# Patient Record
Sex: FEMALE | Race: BLACK OR AFRICAN AMERICAN | NOT HISPANIC OR LATINO | Employment: FULL TIME | URBAN - METROPOLITAN AREA
[De-identification: names, ages, dates, MRNs, and addresses within clinical notes are randomized per-mention and may not be internally consistent; named-entity substitution may affect disease eponyms.]

---
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
## 2023-02-14 ENCOUNTER — HOSPITAL ENCOUNTER (EMERGENCY)
Facility: HOSPITAL | Age: 23
Discharge: HOME | End: 2023-02-14
Attending: EMERGENCY MEDICINE
Payer: MEDICAID

## 2023-02-14 VITALS
OXYGEN SATURATION: 99 % | SYSTOLIC BLOOD PRESSURE: 123 MMHG | RESPIRATION RATE: 18 BRPM | BODY MASS INDEX: 22.3 KG/M2 | TEMPERATURE: 99 F | HEIGHT: 65 IN | DIASTOLIC BLOOD PRESSURE: 75 MMHG | HEART RATE: 88 BPM | WEIGHT: 133.81 LBS

## 2023-02-14 DIAGNOSIS — K59.00 CONSTIPATION, UNSPECIFIED CONSTIPATION TYPE: ICD-10-CM

## 2023-02-14 DIAGNOSIS — R10.9 ABDOMINAL PAIN IN PREGNANCY, FIRST TRIMESTER: Primary | ICD-10-CM

## 2023-02-14 DIAGNOSIS — O26.891 ABDOMINAL PAIN IN PREGNANCY, FIRST TRIMESTER: Primary | ICD-10-CM

## 2023-02-14 PROCEDURE — 99284 EMERGENCY DEPT VISIT MOD MDM: CPT | Mod: 25

## 2023-02-14 NOTE — Clinical Note
"Izzy Hollowaystu Palacio was seen and treated in our emergency department on 2/14/2023.  She may return to work on 03/01/2023.  Patient was seen and admitted 0n 2/19 and allowed to return to work on March 1, 2023.      If you have any questions or concerns, please don't hesitate to call.       LPN    "

## 2023-02-15 NOTE — DISCHARGE INSTRUCTIONS
You may take Unisom 20 mg (doxylamine) and vitamin B6 20 mg (pyridoxine) at bedtime to help with nausea.     Eat several small meals per day.    Krista candy and peppermints may also help with nausea.      You may take Colace (stool softener) 200 mg daily to help with constipation and Gas X to help with bloating.    Follow up with your OB next week.    Drink plenty of fluids and get a lot of rest.     Return to ER for any changes or worsening of symptoms.

## 2023-02-15 NOTE — ED PROVIDER NOTES
Encounter Date: 2023       History     Chief Complaint   Patient presents with    Abdominal Pain     Pt reports lower abdominal pain and constipation x 3 days. Passed hard stool today. Confirmed pregnancy x 2 weeks.      21 YO AAF in ER with complaints of lower abdominal pain with bloating and constipation. Also having breast tenderness with nausea and vomiting. LMP was 23 and she has her first OB appt next week.  and currently 6W 5D by LMP. Denies vaginal bleeding, pelvic pain, vaginal discharge, loss of fluids, contractions, back pain, fever, chills, chest pain, SOB, diarrhea, HA or dizziness.     The history is provided by the patient.   Review of patient's allergies indicates:  No Known Allergies  History reviewed. No pertinent past medical history.  History reviewed. No pertinent surgical history.  History reviewed. No pertinent family history.  Social History     Tobacco Use    Smoking status: Never    Smokeless tobacco: Never   Substance Use Topics    Drug use: Never     Review of Systems   Constitutional:  Negative for chills and fever.   HENT:  Negative for congestion and sore throat.    Respiratory:  Negative for shortness of breath.    Cardiovascular:  Negative for chest pain.   Gastrointestinal:  Positive for abdominal pain, constipation, nausea and vomiting. Negative for diarrhea.   Genitourinary:  Negative for dysuria, pelvic pain, vaginal bleeding, vaginal discharge and vaginal pain.   Musculoskeletal:  Negative for back pain.   Skin:  Negative for rash.   Neurological:  Negative for dizziness, weakness, light-headedness and headaches.   Hematological:  Does not bruise/bleed easily.   All other systems reviewed and are negative.    Physical Exam     Initial Vitals [23 1745]   BP Pulse Resp Temp SpO2   (!) 146/85 104 17 98.6 °F (37 °C) 100 %      MAP       --         Physical Exam    Nursing note and vitals reviewed.  Constitutional: She appears well-developed and well-nourished.  She is not diaphoretic. No distress.   HENT:   Head: Normocephalic and atraumatic.   Nose: Nose normal.   Eyes: Conjunctivae are normal.   Cardiovascular:  Normal rate and regular rhythm.           Pulmonary/Chest: Breath sounds normal.   Abdominal: Abdomen is soft. Bowel sounds are normal. There is abdominal tenderness (mild generalized, tympanic to percussion). There is no rebound and no guarding.   Musculoskeletal:         General: Normal range of motion.     Neurological: She is alert and oriented to person, place, and time.   Skin: Skin is warm and dry.   Psychiatric: She has a normal mood and affect.       ED Course   ED US Pelvis OB    Date/Time: 2/14/2023 8:51 PM  Performed by: LINDA Holt  Authorized by: J Luis Blackburn DO     Indication:  Pregnancy  Identified Structures:  Uterus transverse and Uterus sagittal  Definitive IUP:  Indeterminant  Uterine Contents:  Other (too much bowel gas to visualize uterus/contents)  Impression:  No definitive IUP  Charge?:  No (educational)  Labs Reviewed - No data to display       Imaging Results    None          Medications - No data to display              ED Course as of 02/14/23 2054 Tue Feb 14, 2023 2052 VSS, NAD, pt is non-toxic or ill appearing, offered pt official US to rule out ectopic pregnancy even though is it low on my differential due to lower abdominal pain and unable to see IUP with bedside US, pt declines and states she would rather follow up with her OB next week, strict return to ER precautions given, she verbalized understanding, will give instructions on what to take for constipation and gas during pregnancy, she verbalized understanding,  treatment plan and discharge instructions including follow up discussed, pt verbalized understanding, all questions answered, pt is stable and ready for discharge  [TT]      ED Course User Index  [TT] LINDA Holt                 Clinical Impression:   Final diagnoses:  [O26.891, R10.9] Abdominal pain  in pregnancy, first trimester (Primary)  [K59.00] Constipation, unspecified constipation type        ED Disposition Condition    Discharge Stable          ED Prescriptions    None       Follow-up Information       Follow up With Specialties Details Why Contact Info    [facility] - Emergency Dept Emergency Medicine In 3 days As needed, If symptoms worsen [address] [facility] [id number] [phone]    [facility]    Keep your initial OB appointment next week             LINDA Holt  02/14/23 1130

## 2023-02-19 ENCOUNTER — ANESTHESIA (OUTPATIENT)
Dept: SURGERY | Facility: HOSPITAL | Age: 23
End: 2023-02-19
Payer: MEDICAID

## 2023-02-19 ENCOUNTER — ANESTHESIA EVENT (OUTPATIENT)
Dept: SURGERY | Facility: HOSPITAL | Age: 23
End: 2023-02-19
Payer: MEDICAID

## 2023-02-19 ENCOUNTER — HOSPITAL ENCOUNTER (OUTPATIENT)
Facility: HOSPITAL | Age: 23
Discharge: HOME | End: 2023-02-20
Attending: EMERGENCY MEDICINE | Admitting: OBSTETRICS & GYNECOLOGY
Payer: MEDICAID

## 2023-02-19 DIAGNOSIS — R73.9 HYPERGLYCEMIA: ICD-10-CM

## 2023-02-19 DIAGNOSIS — O00.90 RUPTURED ECTOPIC PREGNANCY: Primary | ICD-10-CM

## 2023-02-19 DIAGNOSIS — R57.8 HEMORRHAGIC SHOCK: ICD-10-CM

## 2023-02-19 DIAGNOSIS — K66.1 RUPTURED RIGHT TUBAL ECTOPIC PREGNANCY CAUSING HEMOPERITONEUM: ICD-10-CM

## 2023-02-19 DIAGNOSIS — K66.1 RUPTURED LEFT TUBAL ECTOPIC PREGNANCY CAUSING HEMOPERITONEUM: ICD-10-CM

## 2023-02-19 DIAGNOSIS — O00.101 RUPTURED RIGHT TUBAL ECTOPIC PREGNANCY CAUSING HEMOPERITONEUM: ICD-10-CM

## 2023-02-19 DIAGNOSIS — O00.102 RUPTURED LEFT TUBAL ECTOPIC PREGNANCY CAUSING HEMOPERITONEUM: ICD-10-CM

## 2023-02-19 LAB
ABORH RETYPE: NORMAL
ALBUMIN SERPL-MCNC: 3.1 G/DL (ref 3.5–5)
ALBUMIN/GLOB SERPL: 1.3 RATIO (ref 1.1–2)
ALP SERPL-CCNC: 55 UNIT/L (ref 40–150)
ALT SERPL-CCNC: 7 UNIT/L (ref 0–55)
APPEARANCE UR: CLEAR
AST SERPL-CCNC: 9 UNIT/L (ref 5–34)
B-HCG FREE SERPL-ACNC: 4847.94 MIU/ML
BACTERIA #/AREA URNS AUTO: ABNORMAL /HPF
BASOPHILS # BLD AUTO: 0.01 X10(3)/MCL (ref 0–0.2)
BASOPHILS # BLD AUTO: 0.02 X10(3)/MCL (ref 0–0.2)
BASOPHILS NFR BLD AUTO: 0.1 %
BASOPHILS NFR BLD AUTO: 0.1 %
BILIRUB UR QL STRIP.AUTO: NEGATIVE MG/DL
BILIRUBIN DIRECT+TOT PNL SERPL-MCNC: 0.5 MG/DL
BUN SERPL-MCNC: 12.2 MG/DL (ref 7–18.7)
CALCIUM SERPL-MCNC: 8.4 MG/DL (ref 8.4–10.2)
CHLORIDE SERPL-SCNC: 106 MMOL/L (ref 98–107)
CO2 SERPL-SCNC: 15 MMOL/L (ref 22–29)
COLOR UR AUTO: ABNORMAL
CREAT SERPL-MCNC: 1.36 MG/DL (ref 0.55–1.02)
EOSINOPHIL # BLD AUTO: 0 X10(3)/MCL (ref 0–0.9)
EOSINOPHIL # BLD AUTO: 0 X10(3)/MCL (ref 0–0.9)
EOSINOPHIL NFR BLD AUTO: 0 %
EOSINOPHIL NFR BLD AUTO: 0 %
ERYTHROCYTE [DISTWIDTH] IN BLOOD BY AUTOMATED COUNT: 14 % (ref 11.5–17)
ERYTHROCYTE [DISTWIDTH] IN BLOOD BY AUTOMATED COUNT: 14.4 % (ref 11.5–17)
GFR SERPLBLD CREATININE-BSD FMLA CKD-EPI: 57 MLS/MIN/1.73/M2
GLOBULIN SER-MCNC: 2.3 GM/DL (ref 2.4–3.5)
GLUCOSE SERPL-MCNC: 249 MG/DL (ref 74–100)
GLUCOSE UR QL STRIP.AUTO: NORMAL MG/DL
GROUP & RH: NORMAL
HCT VFR BLD AUTO: 25.5 % (ref 37–47)
HCT VFR BLD AUTO: 26 % (ref 37–47)
HGB BLD-MCNC: 8.3 G/DL (ref 12–16)
HGB BLD-MCNC: 8.3 G/DL (ref 12–16)
HYALINE CASTS #/AREA URNS LPF: ABNORMAL /LPF
IMM GRANULOCYTES # BLD AUTO: 0.06 X10(3)/MCL (ref 0–0.04)
IMM GRANULOCYTES # BLD AUTO: 0.08 X10(3)/MCL (ref 0–0.04)
IMM GRANULOCYTES NFR BLD AUTO: 0.3 %
IMM GRANULOCYTES NFR BLD AUTO: 0.5 %
INDIRECT COOMBS GEL: NORMAL
KETONES UR QL STRIP.AUTO: ABNORMAL MG/DL
LEUKOCYTE ESTERASE UR QL STRIP.AUTO: 25 UNIT/L
LYMPHOCYTES # BLD AUTO: 1.04 X10(3)/MCL (ref 0.6–4.6)
LYMPHOCYTES # BLD AUTO: 1.05 X10(3)/MCL (ref 0.6–4.6)
LYMPHOCYTES NFR BLD AUTO: 5.9 %
LYMPHOCYTES NFR BLD AUTO: 6.2 %
MCH RBC QN AUTO: 28.1 PG
MCH RBC QN AUTO: 28.9 PG
MCHC RBC AUTO-ENTMCNC: 31.9 G/DL (ref 33–36)
MCHC RBC AUTO-ENTMCNC: 32.5 G/DL (ref 33–36)
MCV RBC AUTO: 86.4 FL (ref 80–94)
MCV RBC AUTO: 90.6 FL (ref 80–94)
MONOCYTES # BLD AUTO: 0.36 X10(3)/MCL (ref 0.1–1.3)
MONOCYTES # BLD AUTO: 0.41 X10(3)/MCL (ref 0.1–1.3)
MONOCYTES NFR BLD AUTO: 2.1 %
MONOCYTES NFR BLD AUTO: 2.3 %
MUCOUS THREADS URNS QL MICRO: ABNORMAL /LPF
NEUTROPHILS # BLD AUTO: 15.29 X10(3)/MCL (ref 2.1–9.2)
NEUTROPHILS # BLD AUTO: 16.13 X10(3)/MCL (ref 2.1–9.2)
NEUTROPHILS NFR BLD AUTO: 91.1 %
NEUTROPHILS NFR BLD AUTO: 91.4 %
NITRITE UR QL STRIP.AUTO: NEGATIVE
NRBC BLD AUTO-RTO: 0 %
NRBC BLD AUTO-RTO: 0 %
PH UR STRIP.AUTO: 6 [PH]
PLATELET # BLD AUTO: 238 X10(3)/MCL (ref 130–400)
PLATELET # BLD AUTO: 337 X10(3)/MCL (ref 130–400)
PMV BLD AUTO: 10.4 FL (ref 7.4–10.4)
PMV BLD AUTO: 11.4 FL (ref 7.4–10.4)
POCT GLUCOSE: 114 MG/DL (ref 70–110)
POCT GLUCOSE: 120 MG/DL (ref 70–110)
POTASSIUM SERPL-SCNC: 4 MMOL/L (ref 3.5–5.1)
PROT SERPL-MCNC: 5.4 GM/DL (ref 6.4–8.3)
PROT UR QL STRIP.AUTO: NEGATIVE MG/DL
RBC # BLD AUTO: 2.87 X10(6)/MCL (ref 4.2–5.4)
RBC # BLD AUTO: 2.95 X10(6)/MCL (ref 4.2–5.4)
RBC #/AREA URNS AUTO: ABNORMAL /HPF
RBC UR QL AUTO: NEGATIVE UNIT/L
SODIUM SERPL-SCNC: 136 MMOL/L (ref 136–145)
SP GR UR STRIP.AUTO: 1.01
SQUAMOUS #/AREA URNS LPF: ABNORMAL /HPF
UROBILINOGEN UR STRIP-ACNC: NORMAL MG/DL
WBC # SPEC AUTO: 16.8 X10(3)/MCL (ref 4.5–11.5)
WBC # SPEC AUTO: 17.7 X10(3)/MCL (ref 4.5–11.5)
WBC #/AREA URNS AUTO: ABNORMAL /HPF

## 2023-02-19 PROCEDURE — 71000033 HC RECOVERY, INTIAL HOUR: Performed by: OBSTETRICS & GYNECOLOGY

## 2023-02-19 PROCEDURE — 63600175 PHARM REV CODE 636 W HCPCS: Performed by: ANESTHESIOLOGY

## 2023-02-19 PROCEDURE — 81001 URINALYSIS AUTO W/SCOPE: CPT | Performed by: STUDENT IN AN ORGANIZED HEALTH CARE EDUCATION/TRAINING PROGRAM

## 2023-02-19 PROCEDURE — 96361 HYDRATE IV INFUSION ADD-ON: CPT | Mod: 59

## 2023-02-19 PROCEDURE — 37000009 HC ANESTHESIA EA ADD 15 MINS: Performed by: OBSTETRICS & GYNECOLOGY

## 2023-02-19 PROCEDURE — 63600175 PHARM REV CODE 636 W HCPCS: Performed by: STUDENT IN AN ORGANIZED HEALTH CARE EDUCATION/TRAINING PROGRAM

## 2023-02-19 PROCEDURE — 86850 RBC ANTIBODY SCREEN: CPT | Performed by: EMERGENCY MEDICINE

## 2023-02-19 PROCEDURE — 00840 ANES IPER PX LOWER ABD NOS: CPT | Performed by: OBSTETRICS & GYNECOLOGY

## 2023-02-19 PROCEDURE — 96375 TX/PRO/DX INJ NEW DRUG ADDON: CPT

## 2023-02-19 PROCEDURE — G0378 HOSPITAL OBSERVATION PER HR: HCPCS

## 2023-02-19 PROCEDURE — 96376 TX/PRO/DX INJ SAME DRUG ADON: CPT

## 2023-02-19 PROCEDURE — 86900 BLOOD TYPING SEROLOGIC ABO: CPT | Performed by: EMERGENCY MEDICINE

## 2023-02-19 PROCEDURE — 86920 COMPATIBILITY TEST SPIN: CPT | Performed by: EMERGENCY MEDICINE

## 2023-02-19 PROCEDURE — 96376 TX/PRO/DX INJ SAME DRUG ADON: CPT | Mod: 59

## 2023-02-19 PROCEDURE — 85025 COMPLETE CBC W/AUTO DIFF WBC: CPT | Performed by: STUDENT IN AN ORGANIZED HEALTH CARE EDUCATION/TRAINING PROGRAM

## 2023-02-19 PROCEDURE — P9016 RBC LEUKOCYTES REDUCED: HCPCS | Performed by: EMERGENCY MEDICINE

## 2023-02-19 PROCEDURE — 96374 THER/PROPH/DIAG INJ IV PUSH: CPT | Mod: 59

## 2023-02-19 PROCEDURE — 25000003 PHARM REV CODE 250: Performed by: STUDENT IN AN ORGANIZED HEALTH CARE EDUCATION/TRAINING PROGRAM

## 2023-02-19 PROCEDURE — 36430 TRANSFUSION BLD/BLD COMPNT: CPT

## 2023-02-19 PROCEDURE — 25000003 PHARM REV CODE 250: Performed by: EMERGENCY MEDICINE

## 2023-02-19 PROCEDURE — 25000003 PHARM REV CODE 250: Performed by: NURSE ANESTHETIST, CERTIFIED REGISTERED

## 2023-02-19 PROCEDURE — 37000008 HC ANESTHESIA 1ST 15 MINUTES: Performed by: OBSTETRICS & GYNECOLOGY

## 2023-02-19 PROCEDURE — 63600175 PHARM REV CODE 636 W HCPCS: Performed by: NURSE ANESTHETIST, CERTIFIED REGISTERED

## 2023-02-19 PROCEDURE — 27201423 OPTIME MED/SURG SUP & DEVICES STERILE SUPPLY: Performed by: OBSTETRICS & GYNECOLOGY

## 2023-02-19 PROCEDURE — 80053 COMPREHEN METABOLIC PANEL: CPT | Performed by: EMERGENCY MEDICINE

## 2023-02-19 PROCEDURE — 84702 CHORIONIC GONADOTROPIN TEST: CPT | Performed by: EMERGENCY MEDICINE

## 2023-02-19 PROCEDURE — 99900035 HC TECH TIME PER 15 MIN (STAT)

## 2023-02-19 PROCEDURE — 36000706: Performed by: OBSTETRICS & GYNECOLOGY

## 2023-02-19 PROCEDURE — 99291 CRITICAL CARE FIRST HOUR: CPT

## 2023-02-19 PROCEDURE — 86901 BLOOD TYPING SEROLOGIC RH(D): CPT | Performed by: EMERGENCY MEDICINE

## 2023-02-19 PROCEDURE — 88305 TISSUE EXAM BY PATHOLOGIST: CPT | Performed by: OBSTETRICS & GYNECOLOGY

## 2023-02-19 PROCEDURE — 94799 UNLISTED PULMONARY SVC/PX: CPT

## 2023-02-19 PROCEDURE — 85025 COMPLETE CBC W/AUTO DIFF WBC: CPT | Performed by: EMERGENCY MEDICINE

## 2023-02-19 PROCEDURE — 36000707: Performed by: OBSTETRICS & GYNECOLOGY

## 2023-02-19 RX ORDER — GLUCAGON 1 MG
1 KIT INJECTION
Status: DISCONTINUED | OUTPATIENT
Start: 2023-02-19 | End: 2023-02-20 | Stop reason: HOSPADM

## 2023-02-19 RX ORDER — KETOROLAC TROMETHAMINE 30 MG/ML
30 INJECTION, SOLUTION INTRAMUSCULAR; INTRAVENOUS EVERY 8 HOURS
Status: COMPLETED | OUTPATIENT
Start: 2023-02-19 | End: 2023-02-20

## 2023-02-19 RX ORDER — MORPHINE SULFATE 2 MG/ML
INJECTION, SOLUTION INTRAMUSCULAR; INTRAVENOUS
Status: DISPENSED
Start: 2023-02-19 | End: 2023-02-19

## 2023-02-19 RX ORDER — SODIUM CHLORIDE, SODIUM LACTATE, POTASSIUM CHLORIDE, CALCIUM CHLORIDE 600; 310; 30; 20 MG/100ML; MG/100ML; MG/100ML; MG/100ML
INJECTION, SOLUTION INTRAVENOUS CONTINUOUS
Status: DISCONTINUED | OUTPATIENT
Start: 2023-02-19 | End: 2023-02-19

## 2023-02-19 RX ORDER — TALC
6 POWDER (GRAM) TOPICAL NIGHTLY PRN
Status: DISCONTINUED | OUTPATIENT
Start: 2023-02-19 | End: 2023-02-20 | Stop reason: HOSPADM

## 2023-02-19 RX ORDER — SUCCINYLCHOLINE CHLORIDE 20 MG/ML
INJECTION INTRAMUSCULAR; INTRAVENOUS
Status: DISCONTINUED | OUTPATIENT
Start: 2023-02-19 | End: 2023-02-19

## 2023-02-19 RX ORDER — INSULIN ASPART 100 [IU]/ML
0-5 INJECTION, SOLUTION INTRAVENOUS; SUBCUTANEOUS
Status: DISCONTINUED | OUTPATIENT
Start: 2023-02-19 | End: 2023-02-20 | Stop reason: HOSPADM

## 2023-02-19 RX ORDER — OXYCODONE HYDROCHLORIDE 5 MG/1
5 TABLET ORAL
Status: DISCONTINUED | OUTPATIENT
Start: 2023-02-19 | End: 2023-02-19

## 2023-02-19 RX ORDER — DEXTROSE 40 %
15 GEL (GRAM) ORAL
Status: DISCONTINUED | OUTPATIENT
Start: 2023-02-19 | End: 2023-02-20 | Stop reason: HOSPADM

## 2023-02-19 RX ORDER — KETOROLAC TROMETHAMINE 30 MG/ML
INJECTION, SOLUTION INTRAMUSCULAR; INTRAVENOUS
Status: DISCONTINUED | OUTPATIENT
Start: 2023-02-19 | End: 2023-02-19

## 2023-02-19 RX ORDER — DEXAMETHASONE SODIUM PHOSPHATE 4 MG/ML
INJECTION, SOLUTION INTRA-ARTICULAR; INTRALESIONAL; INTRAMUSCULAR; INTRAVENOUS; SOFT TISSUE
Status: DISCONTINUED | OUTPATIENT
Start: 2023-02-19 | End: 2023-02-19

## 2023-02-19 RX ORDER — DOCUSATE SODIUM 100 MG/1
100 CAPSULE, LIQUID FILLED ORAL 2 TIMES DAILY
Status: DISCONTINUED | OUTPATIENT
Start: 2023-02-19 | End: 2023-02-20 | Stop reason: HOSPADM

## 2023-02-19 RX ORDER — ONDANSETRON 4 MG/1
8 TABLET, ORALLY DISINTEGRATING ORAL EVERY 8 HOURS PRN
Status: DISCONTINUED | OUTPATIENT
Start: 2023-02-19 | End: 2023-02-20 | Stop reason: HOSPADM

## 2023-02-19 RX ORDER — DIPHENHYDRAMINE HYDROCHLORIDE 50 MG/ML
25 INJECTION INTRAMUSCULAR; INTRAVENOUS EVERY 4 HOURS PRN
Status: DISCONTINUED | OUTPATIENT
Start: 2023-02-19 | End: 2023-02-20 | Stop reason: HOSPADM

## 2023-02-19 RX ORDER — ONDANSETRON 2 MG/ML
4 INJECTION INTRAMUSCULAR; INTRAVENOUS DAILY PRN
Status: DISCONTINUED | OUTPATIENT
Start: 2023-02-19 | End: 2023-02-19

## 2023-02-19 RX ORDER — MORPHINE SULFATE 2 MG/ML
2 INJECTION, SOLUTION INTRAMUSCULAR; INTRAVENOUS EVERY 5 MIN PRN
Status: DISCONTINUED | OUTPATIENT
Start: 2023-02-19 | End: 2023-02-19

## 2023-02-19 RX ORDER — SODIUM CHLORIDE 0.9 % (FLUSH) 0.9 %
10 SYRINGE (ML) INJECTION
Status: DISCONTINUED | OUTPATIENT
Start: 2023-02-19 | End: 2023-02-20 | Stop reason: HOSPADM

## 2023-02-19 RX ORDER — CEFAZOLIN SODIUM 1 G/3ML
INJECTION, POWDER, FOR SOLUTION INTRAMUSCULAR; INTRAVENOUS
Status: DISCONTINUED | OUTPATIENT
Start: 2023-02-19 | End: 2023-02-19

## 2023-02-19 RX ORDER — IBUPROFEN 600 MG/1
600 TABLET ORAL EVERY 6 HOURS
Status: DISCONTINUED | OUTPATIENT
Start: 2023-02-20 | End: 2023-02-20 | Stop reason: HOSPADM

## 2023-02-19 RX ORDER — ONDANSETRON 2 MG/ML
INJECTION INTRAMUSCULAR; INTRAVENOUS
Status: DISCONTINUED | OUTPATIENT
Start: 2023-02-19 | End: 2023-02-19

## 2023-02-19 RX ORDER — OXYCODONE AND ACETAMINOPHEN 5; 325 MG/1; MG/1
1 TABLET ORAL EVERY 4 HOURS PRN
Status: DISCONTINUED | OUTPATIENT
Start: 2023-02-19 | End: 2023-02-20 | Stop reason: HOSPADM

## 2023-02-19 RX ORDER — MEPERIDINE HYDROCHLORIDE 25 MG/ML
12.5 INJECTION INTRAMUSCULAR; INTRAVENOUS; SUBCUTANEOUS EVERY 10 MIN PRN
Status: DISCONTINUED | OUTPATIENT
Start: 2023-02-19 | End: 2023-02-19

## 2023-02-19 RX ORDER — LIDOCAINE HYDROCHLORIDE 20 MG/ML
INJECTION INTRAVENOUS
Status: DISCONTINUED | OUTPATIENT
Start: 2023-02-19 | End: 2023-02-19

## 2023-02-19 RX ORDER — MUPIROCIN 20 MG/G
OINTMENT TOPICAL 2 TIMES DAILY
Status: DISCONTINUED | OUTPATIENT
Start: 2023-02-19 | End: 2023-02-20 | Stop reason: HOSPADM

## 2023-02-19 RX ORDER — BISACODYL 10 MG
10 SUPPOSITORY, RECTAL RECTAL DAILY PRN
Status: DISCONTINUED | OUTPATIENT
Start: 2023-02-19 | End: 2023-02-20 | Stop reason: HOSPADM

## 2023-02-19 RX ORDER — PROPOFOL 10 MG/ML
VIAL (ML) INTRAVENOUS
Status: DISCONTINUED | OUTPATIENT
Start: 2023-02-19 | End: 2023-02-19

## 2023-02-19 RX ORDER — PROCHLORPERAZINE EDISYLATE 5 MG/ML
5 INJECTION INTRAMUSCULAR; INTRAVENOUS EVERY 6 HOURS PRN
Status: DISCONTINUED | OUTPATIENT
Start: 2023-02-19 | End: 2023-02-20 | Stop reason: HOSPADM

## 2023-02-19 RX ORDER — DIPHENHYDRAMINE HCL 25 MG
25 CAPSULE ORAL EVERY 4 HOURS PRN
Status: DISCONTINUED | OUTPATIENT
Start: 2023-02-19 | End: 2023-02-20 | Stop reason: HOSPADM

## 2023-02-19 RX ORDER — OXYCODONE AND ACETAMINOPHEN 10; 325 MG/1; MG/1
1 TABLET ORAL EVERY 4 HOURS PRN
Status: DISCONTINUED | OUTPATIENT
Start: 2023-02-19 | End: 2023-02-20 | Stop reason: HOSPADM

## 2023-02-19 RX ORDER — FENTANYL CITRATE 50 UG/ML
INJECTION, SOLUTION INTRAMUSCULAR; INTRAVENOUS
Status: DISCONTINUED | OUTPATIENT
Start: 2023-02-19 | End: 2023-02-19

## 2023-02-19 RX ADMIN — CEFAZOLIN 2 G: 330 INJECTION, POWDER, FOR SOLUTION INTRAMUSCULAR; INTRAVENOUS at 07:02

## 2023-02-19 RX ADMIN — FENTANYL CITRATE 25 MCG: 50 INJECTION, SOLUTION INTRAMUSCULAR; INTRAVENOUS at 08:02

## 2023-02-19 RX ADMIN — MORPHINE SULFATE 2 MG: 2 INJECTION, SOLUTION INTRAMUSCULAR; INTRAVENOUS at 10:02

## 2023-02-19 RX ADMIN — SODIUM CHLORIDE, POTASSIUM CHLORIDE, SODIUM LACTATE AND CALCIUM CHLORIDE: 600; 310; 30; 20 INJECTION, SOLUTION INTRAVENOUS at 07:02

## 2023-02-19 RX ADMIN — MUPIROCIN: 20 OINTMENT TOPICAL at 08:02

## 2023-02-19 RX ADMIN — SUCCINYLCHOLINE CHLORIDE 120 MG: 20 INJECTION, SOLUTION INTRAMUSCULAR; INTRAVENOUS at 07:02

## 2023-02-19 RX ADMIN — KETOROLAC TROMETHAMINE 30 MG: 30 INJECTION, SOLUTION INTRAMUSCULAR; INTRAVENOUS at 01:02

## 2023-02-19 RX ADMIN — FENTANYL CITRATE 25 MCG: 50 INJECTION, SOLUTION INTRAMUSCULAR; INTRAVENOUS at 07:02

## 2023-02-19 RX ADMIN — ONDANSETRON 4 MG: 2 INJECTION INTRAMUSCULAR; INTRAVENOUS at 08:02

## 2023-02-19 RX ADMIN — KETOROLAC TROMETHAMINE 30 MG: 30 INJECTION, SOLUTION INTRAMUSCULAR; INTRAVENOUS at 08:02

## 2023-02-19 RX ADMIN — OXYCODONE AND ACETAMINOPHEN 1 TABLET: 325; 5 TABLET ORAL at 08:02

## 2023-02-19 RX ADMIN — SUGAMMADEX 200 MG: 100 INJECTION, SOLUTION INTRAVENOUS at 08:02

## 2023-02-19 RX ADMIN — OXYCODONE AND ACETAMINOPHEN 1 TABLET: 325; 5 TABLET ORAL at 01:02

## 2023-02-19 RX ADMIN — MORPHINE SULFATE 2 MG: 2 INJECTION, SOLUTION INTRAMUSCULAR; INTRAVENOUS at 09:02

## 2023-02-19 RX ADMIN — FENTANYL CITRATE 50 MCG: 50 INJECTION, SOLUTION INTRAMUSCULAR; INTRAVENOUS at 08:02

## 2023-02-19 RX ADMIN — PROPOFOL 150 MG: 10 INJECTION, EMULSION INTRAVENOUS at 07:02

## 2023-02-19 RX ADMIN — SODIUM CHLORIDE 1000 ML: 9 INJECTION, SOLUTION INTRAVENOUS at 05:02

## 2023-02-19 RX ADMIN — DOCUSATE SODIUM 100 MG: 100 CAPSULE, LIQUID FILLED ORAL at 08:02

## 2023-02-19 RX ADMIN — SODIUM CHLORIDE: 9 INJECTION, SOLUTION INTRAVENOUS at 07:02

## 2023-02-19 RX ADMIN — KETOROLAC TROMETHAMINE 30 MG: 30 INJECTION, SOLUTION INTRAMUSCULAR; INTRAVENOUS at 09:02

## 2023-02-19 RX ADMIN — DEXAMETHASONE SODIUM PHOSPHATE 8 MG: 4 INJECTION, SOLUTION INTRA-ARTICULAR; INTRALESIONAL; INTRAMUSCULAR; INTRAVENOUS; SOFT TISSUE at 07:02

## 2023-02-19 RX ADMIN — LIDOCAINE HYDROCHLORIDE 50 MG: 20 INJECTION, SOLUTION INTRAVENOUS at 07:02

## 2023-02-19 NOTE — H&P
[facility] OB/GYN CLINIC NOTE  [facility]  [address] [place] [address]  Phone: [phone]  Fax: [phone]    [facility] Gynecology Consult - Resident Note    Consulting Physician: ED  Reason for consult: Suspected ruptured ectopic pregnancy  Subjective:        HPI:   Izzy Palacio is an 22 y.o.  year old woman at 7w2d by LMP with no significant PMH whp presents via EMS with 1 day of worsening abdominal pain and light vaginal bleeding.     On arrival to ED, BP 80/50 and HR 140s. 1L NS given with rise in BP to 100/60s, HR remaining 130s-140s.    Patient alert and responsive, states belly pain began yesterday and has been increasing over time. Her partner reports he found her in bed stiff and thought she was dying so called the ambulance. She reports she has been cold and feeling weak throughout the day. She is 7w2d by LMP, had US 2 that showed obscuring bowel gas and nothing in the uterus.     Review of systems  Negative unless noted in HPI    Past Medical History  History reviewed. No pertinent past medical history.    Past Surgical History  History reviewed. No pertinent surgical history.    Obstetrical History  OB History    Para Term  AB Living   1             SAB IAB Ectopic Multiple Live Births                  # Outcome Date GA Lbr Juan Antonio/2nd Weight Sex Delivery Anes PTL Lv   1 Current                Gynecologic History  Patient's last menstrual period was 2022 (approximate).  STD history: Denies  Pap smear history: Denies abnormal    Allergies  Review of patient's allergies indicates:  No Known Allergies    Family History  History reviewed. No pertinent family history.    Social History  Social History     Tobacco Use    Smoking status: Never    Smokeless tobacco: Never   Substance Use Topics    Alcohol use: Not Currently    Drug use: Never       Overview of active problems  There are no problems to display for this patient.      Medications  Home medications  (Not in a hospital  admission)      Current Inpatient medications    Current Facility-Administered Medications:     sodium chloride 0.9% bolus 1,000 mL 1,000 mL, 1,000 mL, Intravenous, ED 1 Time, Audi Mclean MD, Last Rate: 999 mL/hr at 02/19/23 0515, 1,000 mL at 02/19/23 0515  No current outpatient medications on file.       Objective:     Vitals:    02/19/23 0447   BP: (!) 136/93   BP Location: Left arm   Patient Position: Sitting   Pulse: (!) 134   Resp: 18   Temp: 98.1 °F (36.7 °C)   TempSrc: Temporal   SpO2: 100%     There is no height or weight on file to calculate BMI.    No intake/output data recorded.    Physical Exam:  General: alert and oriented, in mild distress, pale   Lungs: clear to auscultation bilaterally   Heart: regular rhythm, tachycardic   Abdomen: Tense, ttp in RLQ +rebound tenderness, somewhat distended, acute surgical abdomen   DVT Evaluation: No cords or calf tenderness.  No significant calf/ankle edema. Somewhat cold to touch   Extremities: Normal, atraumatic, non-edematous   External genitalia: Normal female genitalia without lesion, discharge or tenderness.   Bimanual Exam: Deferred.   Rectal Exam: Deferred   Speculum Exam: Deferred.   Note: RN chaperone present for entirety of exam.    Results:     LABS  Trended:  No results for input(s): WBC, HGB, HCT, PLT, MCV, RDW, BANDSPCT, LYMPHOPCT, MONOPCT, EOSINOPCT, BASOPCT, NA, K, CL, CO2, BUN, CREATININE, LABCREA, GLU, CALCIUM, PROT, ALBUMIN, BILITOT, AST, ALT, ALKPHOS in the last 168 hours.    Invalid input(s): SEGSPCT  No results found for this or any previous visit (from the past 24 hour(s)).     MICRO:  No pertinent new    IMAGING  TVUS pending     Assessment:     Izzy Palacio is a 22 y.o. female with abdominal pain, acute abdomen, tachycardia, and hypotension; suspected ruptured ectopic pregnancy. To OR for exploratory laparotomy, excision of ectopic pregnancy, possible salpingectomy, possible oophorectomy.      Recommendations:     Suspected ruptured  ectopic pregnancy:  - consents signed  - to OR for above procedure  - Will type and match for 2 units    Discussed with staff, Dr. Barksdale.    Sharon Kendall MD  [facility] PGY3

## 2023-02-19 NOTE — TRANSFER OF CARE
Anesthesia Transfer of Care Note    Patient: Izzy Palacio    Procedure(s) Performed: Procedure(s) (LRB):  Ex Laparatomy, Right Salpingectomy, Evacuation of Hemoperitoneum (N/A)  SALPINGECTOMY (N/A)    Patient location: PACU    Anesthesia Type: general    Transport from OR: Transported from OR on room air with adequate spontaneous ventilation    Post pain: adequate analgesia    Post assessment: no apparent anesthetic complications and tolerated procedure well    Post vital signs: stable    Level of consciousness: sedated    Nausea/Vomiting: no nausea/vomiting    Complications: none    Transfer of care protocol was followedComments: Report to Jane WILKINSON      Last vitals:   Visit Vitals  BP (!) 144/86   Pulse (!) 129   Temp 36.4 °C (97.6 °F) (Temporal)   Resp (!) 27   Wt 68.8 kg (151 lb 10.8 oz)   LMP 12/30/2022 (Approximate)   SpO2 100%   Breastfeeding No   BMI 25.24 kg/m²

## 2023-02-19 NOTE — ANESTHESIA PREPROCEDURE EVALUATION
02/19/2023  Izzy Palacio is a 22 y.o., female with no significant PMHx presents for diagnostic laparoscopy secondary to ruptured ectopic pregnancy.    NO BETA BLOCKER USE    Active Ambulatory Problems     Diagnosis Date Noted    No Active Ambulatory Problems     Resolved Ambulatory Problems     Diagnosis Date Noted    No Resolved Ambulatory Problems     No Additional Past Medical History           Pre-op Assessment    I have reviewed the NPO Status.      Review of Systems  Anesthesia Hx:  No previous Anesthesia    Social:  Non-Smoker    Cardiovascular:  Cardiovascular Normal     Pulmonary:  Pulmonary Normal    Renal/:  Renal/ Normal     Hepatic/GI:  Hepatic/GI Normal    Neurological:  Neurology Normal    Endocrine:  Endocrine Normal      Vitals:    02/19/23 0637 02/19/23 0638 02/19/23 0646 02/19/23 0652   BP: 138/82 122/84 (!) 144/86    BP Location:       Patient Position:       Pulse: (!) 132 (!) 142 (!) 126 (!) 129   Resp: (!) 24 (!) 29 (!) 25 (!) 27   Temp: 36.5 °C (97.7 °F)      TempSrc: Oral      SpO2: 100% 99% (!) 84% 100%   Weight:             Physical Exam  General: Alert, Cooperative and Well nourished    Airway:  Mallampati: II   Mouth Opening: Normal  TM Distance: Normal  Tongue: Normal  Neck ROM: Normal ROM    Dental:  Intact    Chest/Lungs:  Clear to auscultation, Normal Respiratory Rate    Heart:  Rate: Normal  Rhythm: Regular Rhythm  Sounds: Normal      Lab Results   Component Value Date    WBC 16.8 (H) 02/19/2023    HGB 8.3 (L) 02/19/2023    HCT 26.0 (L) 02/19/2023    MCV 90.6 02/19/2023     02/19/2023       CMP  Sodium Level   Date Value Ref Range Status   02/19/2023 136 136 - 145 mmol/L Final     Potassium Level   Date Value Ref Range Status   02/19/2023 4.0 3.5 - 5.1 mmol/L Final     Carbon Dioxide   Date Value Ref Range Status   02/19/2023 15 (L) 22 - 29 mmol/L Final      Blood Urea Nitrogen   Date Value Ref Range Status   02/19/2023 12.2 7.0 - 18.7 mg/dL Final     Creatinine   Date Value Ref Range Status   02/19/2023 1.36 (H) 0.55 - 1.02 mg/dL Final     Calcium Level Total   Date Value Ref Range Status   02/19/2023 8.4 8.4 - 10.2 mg/dL Final     Albumin Level   Date Value Ref Range Status   02/19/2023 3.1 (L) 3.5 - 5.0 g/dL Final     Bilirubin Total   Date Value Ref Range Status   02/19/2023 0.5 <=1.5 mg/dL Final     Alkaline Phosphatase   Date Value Ref Range Status   02/19/2023 55 40 - 150 unit/L Final     Aspartate Aminotransferase   Date Value Ref Range Status   02/19/2023 9 5 - 34 unit/L Final     Alanine Aminotransferase   Date Value Ref Range Status   02/19/2023 7 0 - 55 unit/L Final     eGFR   Date Value Ref Range Status   02/19/2023 57 mls/min/1.73/m2 Final         Anesthesia Plan  Type of Anesthesia, risks & benefits discussed:    Anesthesia Type: Gen ETT  Intra-op Monitoring Plan: Standard ASA Monitors  Post Op Pain Control Plan: IV/PO Opioids PRN  Induction:  rapid sequence and IV  Airway Plan: Direct  Informed Consent: Informed consent signed with the Patient and all parties understand the risks and agree with anesthesia plan.  All questions answered.   ASA Score: 2 Emergent  Day of Surgery Review of History & Physical: H&P Update referred to the surgeon/provider.    Ready For Surgery From Anesthesia Perspective.     .

## 2023-02-19 NOTE — BRIEF OP NOTE
[facility] - Emergency Dept  Brief Operative Note    SUMMARY     Surgery Date: 2/19/2023     Surgeon(s) and Role:     * Shayy Barksdale MD - Primary  *Sharon Kendall MD - Resident    Pre-op Diagnosis:    Suspected ruptured ectopic pregnancy    Post-op Diagnosis:    Right ruptured tubal ectopic pregnancy  Hemoperitoneum  8x6cm right retroperitoneal mass    Procedure(s) (LRB):  Exploratory laparatomy, right salpingectomy, evacuation of hemoperitoneum    Anesthesia: General    Operative Findings: EUA: Normal external genitalia without lesion. Tense abdomen with +rebound tenderness in RLQ. Fullness in bilateral adnexae.  Intraoperatively: Approximately 800cc hemoperitoneum noted immediately upon intraperitoneal entry. Right ruptured tubal ectopic pregnancy in distal portion of right fallopian tube. Left fallopian tube and bilateral ovaries normal in appearance. Anterior and posterior cul de sac without lesion. Uterus 6wk size, mobile. Inflammatory changes noted in adjacent omentum. 8x6cm retroperitoneal mass palpated in right pelvic, smooth, consistent with right pelvic kidney noted on prior ultrasound. Appendix visualized and normal without erythema or edema. Bowel normal in appearance.     Estimated Blood Loss: 800 mL    IVF: 2000mL crystalloid; 300mL packed red blood cells    Urine Output: 300mL misti urine via Isaac catheter; lightened at end of case         Specimens:   Specimen (24h ago, onward)       Start     Ordered    02/19/23 0754  Specimen to Pathology  RELEASE UPON ORDERING        References:    Click here for ordering Quick Tip   Question:  Release to patient  Answer:  Immediate    02/19/23 0754                    [id number]    Sharon Kendall MD  [facility] PGY3

## 2023-02-19 NOTE — Clinical Note
"Izzy Hollowaygemma" Hakeem was seen and treated in our emergency department on 2/19/2023.  She may return to work on 02/25/2023.       If you have any questions or concerns, please don't hesitate to call.      Tyson WILKINSON    "

## 2023-02-19 NOTE — ED NOTES
"Patient's step mother called nursing staff to room stating that patient "is passing out". Upon entering patient's room, patient pale, appears to have syncopal episode. Patient alert, making eye contact with staff. Dr. Audi Mclean at bedside. Patient placed in trendelenburg position. Lactated ringers 1L bolus initiated per IV. Warming blankets placed on patient.    "

## 2023-02-19 NOTE — OP NOTE
[facility] Emergency Dept  Surgery Department  Operative Note    SUMMARY     Date of Procedure: 2/19/2023     Procedure:   Procedure(s) (LRB):  Exploratory laparatomy, right salpingectomy, evacuation of hemoperitoneum    Surgeon(s) and Role:     * Shayy Barksdale MD - Primary  *Sharon Kendall MD - Resident    Pre-op Diagnosis:    Suspected ruptured ectopic pregnancy     Post-op Diagnosis:    Right ruptured tubal ectopic pregnancy  Hemoperitoneum  8x6cm right retroperitoneal mass    Anesthesia: General    Operative Findings: EUA: Normal external genitalia without lesion. Tense abdomen with +rebound tenderness in RLQ. Fullness in bilateral adnexae.  Intraoperatively: Approximately 800cc hemoperitoneum noted immediately upon intraperitoneal entry. Right ruptured tubal ectopic pregnancy in distal portion of right fallopian tube. Left fallopian tube and bilateral ovaries normal in appearance. Anterior and posterior cul de sac without lesion. Uterus 6wk size, mobile. Inflammatory changes noted in adjacent omentum. 8x6cm retroperitoneal mass palpated in right pelvic, smooth, consistent with right pelvic kidney noted on prior ultrasound. Appendix visualized and normal without erythema or edema. Bowel normal in appearance.    Description of Technical Procedures:   Patient was taken to the OR with IVF and one unit packed red blood cells running. Ancef 2 grams IV was administered for infection prophylaxis. SCDs were placed to bilateral lower extremities for DVT prophylaxis. She was placed in dorsal supine position. General endotracheal anesthesia was then administered without incident. The patient was positioned in dorsal lithotomy position using Arik stirrups, with careful attention to leg positioning. Both arms were tucked in  position with liberal padding of the elbows and hands. A Bovie pad was placed on the right side. A timeout was performed.  A pelvic exam was performed and noted the above  findings. The patient was then prepped and draped in the usual sterile fashion.     Attention was paid to the abdomen. A midline vertical incision was made from 4cm below umbilical to above pubic symphysis. The incision was carried down to the fascia with the scalpel. The fascia was incised with the scalpel and the incision was extended vertically with Martinez scissors. The rectus muscles were  bluntly. The peritoneum was grasped and elevated with two hemostats and entered sharply with Metzenbaum scissors. Immediate return of dark red blood and clots was noted.     The incision was extended approximately 2 additional cm superiorly for better visualization. Clots were swept out of the pelvis and posterior cul de sac. An Joel retractor was placed. The patient was placed in Trendelenberg. The right fallopian tube was inspected and ruptured ectopic pregnancy was noted. Left fallopian tube and bilateral ovaries appeared normal. The mesosalpinx below the right fallopian tube was clamped with Joyce clamps, transected with Metzenbaums, and suture ligated x 2 for hemostasis. Good hemostasis was noted. The specimen was handed off to be sent for pathology.    The pelvis was carefully inspected. Blood was wiped out of bilateral pericolic gutters. Additional clots were swept out of the posterior cul de sac. An area of the omentum adjacent to the right adnexa with inflammatory changes was inspected and noted to be actively bleeding. Decision was made to remove small portion of the omentum. The area was clamped, cut and suture ligated. Good hemostasis was noted. The specimen was sent to pathology.    A 8x6cm mass was noted in the right pelvis retroperitoneally, mobile with smooth borders, noted to be consistent with patient's history of right pelvic kidney.    The patient was taken out of Trendelenberg. The pelvis was irrigated, and good hemostasis was noted. The right mesosalpinx and remnant of the fallopian tube were  examined again and were noted to be hemostatic. The Joel retractor was removed.      The fascia and peritoneum were closed together with 0 PDS in a running fashion. Three interrupted sutures were placed subcutaenously with 0 vicryl. The skin was closed in a subcuticular fasion with 3-0 vicryl. The incision was covered with Dermabond.     All instrument, sponge counts were correct times two. The patient tolerated the procedure well. She was extubated and transferred to recovery in stable condition.      Dr. Barksdale was present and scrubbed for the entirety of the procedure.    Estimated Blood Loss (EBL): 800 mL    IVF: 2000mL crystalloid; 300mL packed red blood cells     Urine Output: 300mL misti urine via Isaac catheter; lightened at end of case           Implants: None    Specimens:   Specimen (24h ago, onward)       Start     Ordered    02/19/23 0754  Specimen to Pathology  RELEASE UPON ORDERING        References:    Click here for ordering Quick Tip   Question:  Release to patient  Answer:  Immediate    02/19/23 6078                            Condition: Stable    Disposition: PACU - hemodynamically stable.    Sharon Kendall MD  [facility] PGY3

## 2023-02-19 NOTE — ANESTHESIA POSTPROCEDURE EVALUATION
Anesthesia Post Evaluation    Patient: Izzy Palacio    Procedure(s) Performed: Procedure(s) (LRB):  Ex Laparatomy, Right Salpingectomy, Evacuation of Hemoperitoneum (N/A)  SALPINGECTOMY (N/A)    Final Anesthesia Type: general      Patient location during evaluation: PACU  Post-procedure vital signs: reviewed and stable  Airway patency: patent      Anesthetic complications: no      Cardiovascular status: hemodynamically stable  Respiratory status: spontaneous ventilation  Follow-up not needed.          Vitals Value Taken Time   /78 02/19/23 0830   Temp 36.1 °C (96.9 °F) 02/19/23 0828   Pulse 88 02/19/23 0830   Resp 18 02/19/23 0830   SpO2 100 % 02/19/23 0830         No case tracking events are documented in the log.      Pain/Yovani Score: No data recorded

## 2023-02-19 NOTE — ANESTHESIA PROCEDURE NOTES
Intubation    Date/Time: 2/19/2023 7:09 AM  Performed by: Andrés Martinez CRNA  Authorized by: Tana Forbes MD     Intubation:     Induction:  Rapid sequence induction (w/cricoid pressure)    Intubated:  Postinduction    Mask Ventilation:  Not attempted    Attempts:  1    Attempted By:  CRNA    Method of Intubation:  Direct    Blade:  Mendes 3    Laryngeal View Grade: Grade I - full view of cords      Difficult Airway Encountered?: No      Complications:  None    Airway Device:  Oral endotracheal tube    Airway Device Size:  7.5    Style/Cuff Inflation:  Cuffed (inflated to minimal occlusive pressure)    Inflation Amount (mL):  6    Tube secured:  21    Secured at:  The lips    Placement Verified By:  Capnometry    Complicating Factors:  None    Findings Post-Intubation:  BS equal bilateral and atraumatic/condition of teeth unchanged

## 2023-02-19 NOTE — ED PROVIDER NOTES
"Encounter Date: 2023       History     Chief Complaint   Patient presents with    Abdominal Pain    Vaginal Bleeding     Pt c/o vaginal spotting and pelvic cramping started pta. 7wks pregnant, scheduled to see her Gyn [facility].     22-year-old female arrives via EMS with concern for vaginal bleeding and abdominal pain.  She states that she is , approximately 7 weeks pregnant by dates, last menstrual period , has not yet seen an OBGYN doctor had a pelvic ultrasound.  She states she felt "fine" until about 2 hours ago, then began to have lower abdominal cramping and some vaginal bleeding.  She states the amount of vaginal bleeding is somewhat less than a usual menstrual bleed.  She reports feeling faint and lightheaded.    Review of patient's allergies indicates:  No Known Allergies  History reviewed. No pertinent past medical history.  History reviewed. No pertinent surgical history.  History reviewed. No pertinent family history.  Social History     Tobacco Use    Smoking status: Never    Smokeless tobacco: Never   Substance Use Topics    Alcohol use: Not Currently    Drug use: Never     Review of Systems   Constitutional:  Positive for fatigue. Negative for chills and fever.   HENT:  Negative for congestion, rhinorrhea and sore throat.    Respiratory:  Negative for chest tightness, shortness of breath and wheezing.    Cardiovascular:  Negative for chest pain, palpitations and leg swelling.   Gastrointestinal:  Positive for abdominal pain. Negative for blood in stool, diarrhea and vomiting.   Endocrine: Negative for polyuria.   Genitourinary:  Negative for dysuria and flank pain.   Musculoskeletal:  Negative for myalgias.   Skin:  Negative for rash.   Neurological:  Positive for weakness and light-headedness. Negative for headaches.   All other systems reviewed and are negative.    Physical Exam     Initial Vitals [23 0447]   BP Pulse Resp Temp SpO2   (!) 136/93 " (!) 134 18 98.1 °F (36.7 °C) 100 %      MAP       --         Physical Exam    ED Course   Procedures  Labs Reviewed   COMPREHENSIVE METABOLIC PANEL - Abnormal; Notable for the following components:       Result Value    Carbon Dioxide 15 (*)     Glucose Level 249 (*)     Creatinine 1.36 (*)     Protein Total 5.4 (*)     Albumin Level 3.1 (*)     Globulin 2.3 (*)     All other components within normal limits   HCG, QUANTITATIVE - Abnormal; Notable for the following components:    Beta Human Chorionic Gonadotropin Quantitative 4,847.94 (*)     All other components within normal limits   CBC WITH DIFFERENTIAL - Abnormal; Notable for the following components:    WBC 16.8 (*)     RBC 2.87 (*)     Hgb 8.3 (*)     Hct 26.0 (*)     MCHC 31.9 (*)     MPV 11.4 (*)     Neut # 15.29 (*)     IG# 0.08 (*)     All other components within normal limits   CBC W/ AUTO DIFFERENTIAL    Narrative:     The following orders were created for panel order CBC Auto Differential.  Procedure                               Abnormality         Status                     ---------                               -----------         ------                     CBC with Differential[[id number]]        Abnormal            Final result                 Please view results for these tests on the individual orders.   URINALYSIS, REFLEX TO URINE CULTURE   TYPE & SCREEN   ABORH RETYPE          Imaging Results              US OB <14 Wks TransAbd & TransVag, Single Gestation (XPD) (No Result on File)                      Medications   sodium chloride 0.9% flush 10 mL (has no administration in time range)   melatonin tablet 6 mg (has no administration in time range)   sodium chloride 0.9% bolus 1,000 mL 1,000 mL (1,000 mLs Intravenous New Bag 2/19/23 0515)     Medical Decision Making:   Initial Assessment:   22-year-old G1 at 7 weeks gestational age presenting with abdominal pain and vaginal bleed, differential diagnosis includes but is not limited to threatened  "miscarriage, UTI, ruptured ectopic, hemorrhagic shock.    Shortly after arrival to the emergency department, patient complains of worsening lightheadedness, and is found to have blood pressure of 80/50 with heart rate 140.  She actually reports abdominal pain is only 2/10, and does not feel she is currently having any vaginal bleeding, and confirms that the amount of vaginal bleeding she did have was "small" unless than her usual menses.    Immediate request is made to have 2 large-bore Ivs in place, administer 1 L of crystalloid, frequent blood pressure checks, and send blood to lab with notification that urgent or emergent blood transfusion may be required.    At present time, 5:40 a.m., patient's heart rate has improved to 130, blood pressure improved to 105/69, and she reports feeling somewhat better.  Continues to deny any significant active vaginal bleeding.  OBGYN doctor Kendall present in the ER seeing patient.    At present time, 6:09 a.m., patient again became hypotensive.  She is placed in Trendelenburg, an additional L of crystalloid is bolused, and request is made for lab to provide 2 units packed red cells, 0- emergency release to be given stat.  I discussed patient's history, presentation, workup, results, impression, and suggested plan of care with OBGYMATHEUS, Dr. Kendall - hypotension, abdominal pain, pregnancy, concern for ruptured ectopic, hemorrhagic shock.  They will see the patient in the ED to determine eligibility for admission to the hospital.      Critical Care:     Aggregate critical care time was 55 minutes which includes only time during which I was engaged in work directly related to patients care, as described above, whether at bedside or elsewhere in the ED. It did not include time spent performing other reported procedures or the services of physician assistants, residents, students, or nurses.    The high probability of sudden and clinically significant deterioration of patient condition " required highest level of my preparedness to intervene urgently.  The services provided to this patient were to treat and/or prevent clinically significant deterioration that could result in permanent disability or death due to  .    Services included the following: chart data review, reviewing nurses notes, gathering information form relatives, gathering information for EMS personal, reviewing old charts, documentation time, consultant collaboration regarding findings and treatment options, medication orders/management, direct patient care, re-evaluation of patient response to treatment, vital signs assessments and calculations, interpreting and reviewing diagnostic studies/labs.                              Clinical Impression:   Final diagnoses:  [O00.102, K66.1] Ruptured left tubal ectopic pregnancy causing hemoperitoneum  [R57.8] Hemorrhagic shock        ED Disposition Condition    Observation                 Audi Mclean MD  02/19/23 0742

## 2023-02-20 VITALS
TEMPERATURE: 98 F | RESPIRATION RATE: 20 BRPM | HEART RATE: 79 BPM | BODY MASS INDEX: 25.27 KG/M2 | SYSTOLIC BLOOD PRESSURE: 111 MMHG | OXYGEN SATURATION: 100 % | WEIGHT: 151.69 LBS | HEIGHT: 65 IN | DIASTOLIC BLOOD PRESSURE: 58 MMHG

## 2023-02-20 PROBLEM — O00.101 RUPTURED RIGHT TUBAL ECTOPIC PREGNANCY CAUSING HEMOPERITONEUM: Status: ACTIVE | Noted: 2023-02-20

## 2023-02-20 PROBLEM — K66.1 RUPTURED RIGHT TUBAL ECTOPIC PREGNANCY CAUSING HEMOPERITONEUM: Status: ACTIVE | Noted: 2023-02-20

## 2023-02-20 LAB
ALBUMIN SERPL-MCNC: 3.2 G/DL (ref 3.5–5)
ALBUMIN/GLOB SERPL: 1.2 RATIO (ref 1.1–2)
ALP SERPL-CCNC: 55 UNIT/L (ref 40–150)
ALT SERPL-CCNC: 8 UNIT/L (ref 0–55)
AST SERPL-CCNC: 26 UNIT/L (ref 5–34)
BASOPHILS # BLD AUTO: 0.01 X10(3)/MCL (ref 0–0.2)
BASOPHILS NFR BLD AUTO: 0.1 %
BILIRUBIN DIRECT+TOT PNL SERPL-MCNC: 0.6 MG/DL
BUN SERPL-MCNC: 11.5 MG/DL (ref 7–18.7)
CALCIUM SERPL-MCNC: 8.8 MG/DL (ref 8.4–10.2)
CHLORIDE SERPL-SCNC: 112 MMOL/L (ref 98–107)
CO2 SERPL-SCNC: 23 MMOL/L (ref 22–29)
CREAT SERPL-MCNC: 0.84 MG/DL (ref 0.55–1.02)
EOSINOPHIL # BLD AUTO: 0.01 X10(3)/MCL (ref 0–0.9)
EOSINOPHIL NFR BLD AUTO: 0.1 %
ERYTHROCYTE [DISTWIDTH] IN BLOOD BY AUTOMATED COUNT: 14.8 % (ref 11.5–17)
GFR SERPLBLD CREATININE-BSD FMLA CKD-EPI: >60 MLS/MIN/1.73/M2
GLOBULIN SER-MCNC: 2.6 GM/DL (ref 2.4–3.5)
GLUCOSE SERPL-MCNC: 87 MG/DL (ref 74–100)
HCT VFR BLD AUTO: 22.7 % (ref 37–47)
HGB BLD-MCNC: 7.2 G/DL (ref 12–16)
IMM GRANULOCYTES # BLD AUTO: 0.04 X10(3)/MCL (ref 0–0.04)
IMM GRANULOCYTES NFR BLD AUTO: 0.3 %
LYMPHOCYTES # BLD AUTO: 2.55 X10(3)/MCL (ref 0.6–4.6)
LYMPHOCYTES NFR BLD AUTO: 17 %
MCH RBC QN AUTO: 27.7 PG
MCHC RBC AUTO-ENTMCNC: 31.7 G/DL (ref 33–36)
MCV RBC AUTO: 87.3 FL (ref 80–94)
MONOCYTES # BLD AUTO: 1.04 X10(3)/MCL (ref 0.1–1.3)
MONOCYTES NFR BLD AUTO: 6.9 %
NEUTROPHILS # BLD AUTO: 11.38 X10(3)/MCL (ref 2.1–9.2)
NEUTROPHILS NFR BLD AUTO: 75.6 %
NRBC BLD AUTO-RTO: 0 %
PLATELET # BLD AUTO: 260 X10(3)/MCL (ref 130–400)
PMV BLD AUTO: 10.9 FL (ref 7.4–10.4)
POCT GLUCOSE: 83 MG/DL (ref 70–110)
POTASSIUM SERPL-SCNC: 4.4 MMOL/L (ref 3.5–5.1)
PROT SERPL-MCNC: 5.8 GM/DL (ref 6.4–8.3)
RBC # BLD AUTO: 2.6 X10(6)/MCL (ref 4.2–5.4)
SODIUM SERPL-SCNC: 140 MMOL/L (ref 136–145)
WBC # SPEC AUTO: 15 X10(3)/MCL (ref 4.5–11.5)

## 2023-02-20 PROCEDURE — 80053 COMPREHEN METABOLIC PANEL: CPT | Performed by: STUDENT IN AN ORGANIZED HEALTH CARE EDUCATION/TRAINING PROGRAM

## 2023-02-20 PROCEDURE — 85025 COMPLETE CBC W/AUTO DIFF WBC: CPT | Performed by: STUDENT IN AN ORGANIZED HEALTH CARE EDUCATION/TRAINING PROGRAM

## 2023-02-20 PROCEDURE — 94761 N-INVAS EAR/PLS OXIMETRY MLT: CPT

## 2023-02-20 PROCEDURE — 63600175 PHARM REV CODE 636 W HCPCS: Performed by: STUDENT IN AN ORGANIZED HEALTH CARE EDUCATION/TRAINING PROGRAM

## 2023-02-20 PROCEDURE — 25000003 PHARM REV CODE 250: Performed by: STUDENT IN AN ORGANIZED HEALTH CARE EDUCATION/TRAINING PROGRAM

## 2023-02-20 PROCEDURE — G0378 HOSPITAL OBSERVATION PER HR: HCPCS

## 2023-02-20 PROCEDURE — 96376 TX/PRO/DX INJ SAME DRUG ADON: CPT

## 2023-02-20 PROCEDURE — 94760 N-INVAS EAR/PLS OXIMETRY 1: CPT

## 2023-02-20 RX ORDER — DOCUSATE SODIUM 100 MG/1
100 CAPSULE, LIQUID FILLED ORAL 2 TIMES DAILY
Qty: 28 CAPSULE | Refills: 0 | Status: SHIPPED | OUTPATIENT
Start: 2023-02-20 | End: 2023-02-20 | Stop reason: SDUPTHER

## 2023-02-20 RX ORDER — DOCUSATE SODIUM 100 MG/1
100 CAPSULE, LIQUID FILLED ORAL 2 TIMES DAILY
Qty: 28 CAPSULE | Refills: 0 | Status: SHIPPED | OUTPATIENT
Start: 2023-02-20 | End: 2023-03-06

## 2023-02-20 RX ORDER — OXYCODONE HYDROCHLORIDE 5 MG/1
5 TABLET ORAL EVERY 4 HOURS PRN
Qty: 15 TABLET | Refills: 0 | Status: SHIPPED | OUTPATIENT
Start: 2023-02-20 | End: 2023-02-20 | Stop reason: SDUPTHER

## 2023-02-20 RX ORDER — ACETAMINOPHEN 500 MG
500 TABLET ORAL EVERY 6 HOURS
Qty: 120 TABLET | Refills: 0 | Status: SHIPPED | OUTPATIENT
Start: 2023-02-20 | End: 2023-02-20 | Stop reason: SDUPTHER

## 2023-02-20 RX ORDER — OXYCODONE HYDROCHLORIDE 5 MG/1
5 TABLET ORAL EVERY 4 HOURS PRN
Qty: 15 TABLET | Refills: 0 | Status: ON HOLD | OUTPATIENT
Start: 2023-02-20 | End: 2024-03-12 | Stop reason: HOSPADM

## 2023-02-20 RX ORDER — IBUPROFEN 600 MG/1
600 TABLET ORAL EVERY 6 HOURS
Qty: 120 TABLET | Refills: 0 | Status: SHIPPED | OUTPATIENT
Start: 2023-02-20 | End: 2023-03-22

## 2023-02-20 RX ORDER — IBUPROFEN 600 MG/1
600 TABLET ORAL EVERY 6 HOURS
Qty: 120 TABLET | Refills: 0 | Status: SHIPPED | OUTPATIENT
Start: 2023-02-20 | End: 2023-02-20 | Stop reason: SDUPTHER

## 2023-02-20 RX ORDER — ACETAMINOPHEN 500 MG
500 TABLET ORAL EVERY 6 HOURS
Qty: 120 TABLET | Refills: 0 | Status: SHIPPED | OUTPATIENT
Start: 2023-02-20 | End: 2023-03-22

## 2023-02-20 RX ADMIN — DOCUSATE SODIUM 100 MG: 100 CAPSULE, LIQUID FILLED ORAL at 09:02

## 2023-02-20 RX ADMIN — MUPIROCIN: 20 OINTMENT TOPICAL at 09:02

## 2023-02-20 RX ADMIN — KETOROLAC TROMETHAMINE 30 MG: 30 INJECTION, SOLUTION INTRAMUSCULAR; INTRAVENOUS at 05:02

## 2023-02-20 RX ADMIN — OXYCODONE AND ACETAMINOPHEN 1 TABLET: 325; 5 TABLET ORAL at 10:02

## 2023-02-20 NOTE — DISCHARGE SUMMARY
[facility]  Obstetrics & Gynecology  Discharge Summary    Patient Name: Izzy Palacio  MRN: [id number]  Admission Date: 2023  Hospital Length of Stay: 1 days  Discharge Date and Time:  2023 9:45 AM  Attending Physician: Shayy Barksdale MD   Discharging Provider: Sharon Kendall MD    Hospital Course: Ms. Palacio is a 21 yo now  who presented to the ED on 23 at 7w2d by LMP with hypotension, tachycardia, and abdominal pain and was suspected to have ruptured ectopic pregnancy. She was taken urgently to OR and underwent exploratory laparotomy, evacuation of hemoperitoneum, and right salpingectomy for ruptured right tubal ectopic pregnancy. She received 1 unit pRBC introperatively.     On POD#0, her H/H remained stable 4 hours postoperatively. Her vital signs normalized and urine output was appropriate. Isaac catheter was removed and patient was voiding spontaneously and ambulating without difficulty with pain well controlled. She tolerated a regular diet without nausea or vomiting.     On POD#1, patient reported passing flatus and continued to meet other postoperative milestones. She was deemed stable for discharge home. Postop instructions and return precautions were discussed with patient. Patient will follow up in [facility].     Procedure(s) (LRB):  Ex Laparatomy, Right Salpingectomy, Evacuation of Hemoperitoneum (N/A)  SALPINGECTOMY (N/A)     Consults: None    Significant Diagnostic Studies: Labs: CMP   Recent Labs   Lab 23  0541 23  0831    140   K 4.0 4.4   CO2 15* 23   BUN 12.2 11.5   CREATININE 1.36* 0.84   CALCIUM 8.4 8.8   ALBUMIN 3.1* 3.2*   BILITOT 0.5 0.6   ALKPHOS 55 55   AST 9 26   ALT 7 8   , CBC   Recent Labs   Lab 23  0541 23  1141 23  0831   WBC 16.8* 17.7* 15.0*   HGB 8.3* 8.3* 7.2*   HCT 26.0* 25.5* 22.7*    238 260   , All labs within the past 24 hours have been reviewed, and POCT glucose remain wnl    Pending  Diagnostic Studies:       Procedure Component Value Units Date/Time    EXTRA TUBES [[id number]] Collected: 02/20/23 0906    Order Status: Sent Lab Status: In process Updated: 02/20/23 0906    Specimen: Blood, Venous     Narrative:      The following orders were created for panel order EXTRA TUBES.  Procedure                               Abnormality         Status                     ---------                               -----------         ------                     Gold Top Hold[[id number]]                                    In process                   Please view results for these tests on the individual orders.    EXTRA TUBES [[id number]] Collected: 02/19/23 1142    Order Status: Sent Lab Status: In process Updated: 02/19/23 1142    Specimen: Blood, Venous     Narrative:      The following orders were created for panel order EXTRA TUBES.  Procedure                               Abnormality         Status                     ---------                               -----------         ------                     Light Blue Top Hold[[id number]]                                                         Light Green Top Hold[[id number]]                             In process                 Lavender Top Hold[[id number]]                                                           Gold Top Hold[[id number]]                                    In process                   Please view results for these tests on the individual orders.    Gold Top Hold [[id number]] Collected: 02/20/23 0906    Order Status: Sent Lab Status: In process Updated: 02/20/23 0906    Specimen: Blood, Venous     Lavender Top Hold [[id number]]     Order Status: Sent Lab Status: No result     Specimen: Blood, Venous     Light Blue Top Hold [[id number]]     Order Status: Sent Lab Status: No result     Specimen: Blood, Venous     Specimen to Pathology [[id number]] Collected: 02/19/23 0754    Order Status: Sent Lab Status: No result     Specimen: Tissue from Fallopian  Tube, Right           Final Active Diagnoses:    Diagnosis Date Noted POA    PRINCIPAL PROBLEM:  Ruptured right tubal ectopic pregnancy causing hemoperitoneum [O00.101, K66.1] 02/20/2023 Unknown      Problems Resolved During this Admission:      Discharged Condition: stable    Disposition: Home or Self Care    Follow Up: [facility] in 2 weeks    Patient Instructions:      Diet Adult Regular     Notify your health care provider if you experience any of the following:  temperature >100.4     Notify your health care provider if you experience any of the following:  persistent nausea and vomiting or diarrhea     Notify your health care provider if you experience any of the following:  severe uncontrolled pain     Notify your health care provider if you experience any of the following:  redness, tenderness, or signs of infection (pain, swelling, redness, odor or green/yellow discharge around incision site)     Notify your health care provider if you experience any of the following:  persistent dizziness, light-headedness, or visual disturbances     Notify your health care provider if you experience any of the following:  increased confusion or weakness     No dressing needed     Activity as tolerated   Order Comments: Nothing in the vagina for 4-6 weeks  No tub baths for 4-6 weeks  No lifting heavier than 10 lb for 4 weeks  No driving for 2 weeks or while taking narcotics       Medications:  Reconciled Home Medications:      Medication List        START taking these medications      acetaminophen 500 MG tablet  Commonly known as: TYLENOL  Take 1 tablet (500 mg total) by mouth every 6 (six) hours.     docusate sodium 100 MG capsule  Commonly known as: COLACE  Take 1 capsule (100 mg total) by mouth 2 (two) times daily. for 14 days     ferrous gluconate 225 mg (27 mg iron) Tab  Take 27 mg by mouth once daily.     ibuprofen 600 MG tablet  Commonly known as: ADVIL,MOTRIN  Take 1 tablet (600 mg total) by mouth every 6  (six) hours.     oxyCODONE 5 MG immediate release tablet  Commonly known as: ROXICODONE  Take 1 tablet (5 mg total) by mouth every 4 (four) hours as needed for Pain.            Sharon Kendall MD  Obstetrics & Gynecology  PGY3

## 2023-02-22 LAB
ESTROGEN SERPL-MCNC: NORMAL PG/ML
INSULIN SERPL-ACNC: NORMAL U[IU]/ML
LAB AP CLINICAL INFORMATION: NORMAL
LAB AP GROSS DESCRIPTION: NORMAL
LAB AP REPORT FOOTNOTES: NORMAL
T3RU NFR SERPL: NORMAL %

## 2023-02-23 LAB
ABO + RH BLD: NORMAL
ABO + RH BLD: NORMAL
BLD PROD TYP BPU: NORMAL
BLD PROD TYP BPU: NORMAL
BLOOD UNIT EXPIRATION DATE: NORMAL
BLOOD UNIT EXPIRATION DATE: NORMAL
BLOOD UNIT TYPE CODE: 5100
BLOOD UNIT TYPE CODE: 9500
CROSSMATCH INTERPRETATION: NORMAL
CROSSMATCH INTERPRETATION: NORMAL
DISPENSE STATUS: NORMAL
DISPENSE STATUS: NORMAL
UNIT NUMBER: NORMAL
UNIT NUMBER: NORMAL

## 2023-03-10 ENCOUNTER — OFFICE VISIT (OUTPATIENT)
Dept: GYNECOLOGY | Facility: CLINIC | Age: 23
End: 2023-03-10
Payer: MEDICAID

## 2023-03-10 ENCOUNTER — LAB VISIT (OUTPATIENT)
Dept: LAB | Facility: HOSPITAL | Age: 23
End: 2023-03-10
Attending: OBSTETRICS & GYNECOLOGY
Payer: MEDICAID

## 2023-03-10 VITALS
TEMPERATURE: 98 F | HEART RATE: 79 BPM | SYSTOLIC BLOOD PRESSURE: 118 MMHG | BODY MASS INDEX: 22.1 KG/M2 | OXYGEN SATURATION: 100 % | WEIGHT: 132.63 LBS | DIASTOLIC BLOOD PRESSURE: 74 MMHG | HEIGHT: 65 IN | RESPIRATION RATE: 20 BRPM

## 2023-03-10 DIAGNOSIS — D50.0 IRON DEFICIENCY ANEMIA DUE TO CHRONIC BLOOD LOSS: ICD-10-CM

## 2023-03-10 DIAGNOSIS — D50.0 IRON DEFICIENCY ANEMIA DUE TO CHRONIC BLOOD LOSS: Primary | ICD-10-CM

## 2023-03-10 LAB
BASOPHILS # BLD AUTO: 0.03 X10(3)/MCL (ref 0–0.2)
BASOPHILS NFR BLD AUTO: 0.4 %
EOSINOPHIL # BLD AUTO: 0.03 X10(3)/MCL (ref 0–0.9)
EOSINOPHIL NFR BLD AUTO: 0.4 %
ERYTHROCYTE [DISTWIDTH] IN BLOOD BY AUTOMATED COUNT: 14.8 % (ref 11.5–17)
HCT VFR BLD AUTO: 34.3 % (ref 37–47)
HGB BLD-MCNC: 10.6 G/DL (ref 12–16)
IMM GRANULOCYTES # BLD AUTO: 0.01 X10(3)/MCL (ref 0–0.04)
IMM GRANULOCYTES NFR BLD AUTO: 0.1 %
LYMPHOCYTES # BLD AUTO: 1.71 X10(3)/MCL (ref 0.6–4.6)
LYMPHOCYTES NFR BLD AUTO: 20.7 %
MCH RBC QN AUTO: 28.7 PG
MCHC RBC AUTO-ENTMCNC: 30.9 G/DL (ref 33–36)
MCV RBC AUTO: 93 FL (ref 80–94)
MONOCYTES # BLD AUTO: 0.66 X10(3)/MCL (ref 0.1–1.3)
MONOCYTES NFR BLD AUTO: 8 %
NEUTROPHILS # BLD AUTO: 5.81 X10(3)/MCL (ref 2.1–9.2)
NEUTROPHILS NFR BLD AUTO: 70.4 %
NRBC BLD AUTO-RTO: 0 %
PLATELET # BLD AUTO: 472 X10(3)/MCL (ref 130–400)
PMV BLD AUTO: 9.8 FL (ref 7.4–10.4)
RBC # BLD AUTO: 3.69 X10(6)/MCL (ref 4.2–5.4)
WBC # SPEC AUTO: 8.3 X10(3)/MCL (ref 4.5–11.5)

## 2023-03-10 PROCEDURE — 99213 OFFICE O/P EST LOW 20 MIN: CPT | Mod: PBBFAC

## 2023-03-10 PROCEDURE — 85025 COMPLETE CBC W/AUTO DIFF WBC: CPT

## 2023-03-10 PROCEDURE — 36415 COLL VENOUS BLD VENIPUNCTURE: CPT

## 2023-03-10 NOTE — LETTER
March 10, 2023      [facility]  [address] [address]  Phone: [phone]       Patient: Izzy Palacio   YOB: 2000  Date of Visit: 03/10/2023    To Whom It May Concern:    Bala Palacio  was at [facility] on 02/19/23. The patient may return to work/school on 3/20/23 {With/no} restrictions. If you have any questions or concerns, or if I can be of further assistance, please do not hesitate to contact me.    Sincerely,    Shayy Barksdale MD

## 2023-03-10 NOTE — PROGRESS NOTES
"  Subjective:      Postoperative Follow-up     Izzy Palacio is a 22 y.o.  who presents to the clinic 3 weeks status post  laparotomy salpingectomy  for  ruptured ectopic . Eating a regular diet without difficulty. Bowel movements are normal. The patient is not having any pain.    Patient's medications, allergies, past medical, surgical, social and family histories were reviewed and updated as appropriate.      Review of Systems  Pertinent items are noted in HPI.     PATHOLOGY:      1. Right fallopian tube and omentum, excision:  - Ectopic pregnancy.    - Omentum congestion, hemorrhage, granulation tissue, and chronic inflammation.         Objective:      /74 (BP Location: Left arm, Patient Position: Sitting, BP Method: Medium (Automatic))   Pulse 79   Temp 98.3 °F (36.8 °C) (Oral)   Resp 20   Ht 5' 5" (1.651 m)   Wt 60.1 kg (132 lb 9.6 oz)   LMP 2023 (Approximate)   SpO2 100%   BMI 22.07 kg/m²   General:  alert,appears stated age,cooperative   Abdomen: soft,bowel sounds active,non-tender   Incision:   healing well,no drainage,no erythema,no hernia,no seroma,no swelling,no dehiscence,incision well approximated       Assessment:       22 y.o.  s/p salpingectomy through laparotomy for ruptured ectopic doing well post-operatively.  Operative findings again reviewed. Pathology report discussed.  1. Iron deficiency anemia due to chronic blood loss  CBC Auto Differential              Plan:      CBC, I will contact her with results  She declines contraception at this time.  Rec no pregnancy for 6 months  1. Continue any current medications.  2. Wound care discussed.  3. Activity restrictions:  return to work in 1 week  4. Anticipated return to work: 1-2 weeks.  5. Follow up:  1   year  for with NP.    Problem List Items Addressed This Visit    None  Visit Diagnoses       Iron deficiency anemia due to chronic blood loss    -  Primary    Relevant Orders    CBC Auto Differential           "

## 2023-03-10 NOTE — LETTER
March 10, 2023      [facility]  [address] [address]  Phone: [phone]       Patient: Izzy Palacio   YOB: 2000  Date of Visit: 03/10/2023    To Whom It May Concern:    Bala Palacio  was at [facility] on 03/10/2023. The patient may return to work/school on February 19th- March 20th {With/no} restrictions. If you have any questions or concerns, or if I can be of further assistance, please do not hesitate to contact me.    Sincerely,    Shayy Barksdale MD

## 2023-03-13 ENCOUNTER — TELEPHONE (OUTPATIENT)
Dept: GYNECOLOGY | Facility: CLINIC | Age: 23
End: 2023-03-13
Payer: MEDICAID

## 2023-03-13 NOTE — TELEPHONE ENCOUNTER
----- Message from Shayy Barksdale MD sent at 3/10/2023 12:50 PM CST -----  Notify patient that her anemia is improving.  Stop her oral iron therapy since it is making her dizzy.  She can get her iron within her diet.    ----- Message -----  From: Background User Lab  Sent: 3/10/2023  12:46 PM CST  To: Shayy Barksdale MD

## 2023-11-08 ENCOUNTER — LAB VISIT (OUTPATIENT)
Dept: LAB | Facility: HOSPITAL | Age: 23
End: 2023-11-08
Attending: OBSTETRICS & GYNECOLOGY
Payer: MEDICAID

## 2023-11-08 DIAGNOSIS — Z34.83 PRENATAL CARE, SUBSEQUENT PREGNANCY IN THIRD TRIMESTER: Primary | ICD-10-CM

## 2023-11-08 LAB
ERYTHROCYTE [DISTWIDTH] IN BLOOD BY AUTOMATED COUNT: 13.8 % (ref 11.5–17)
GROUP & RH: NORMAL
HCT VFR BLD AUTO: 35.6 % (ref 37–47)
HGB BLD-MCNC: 11.6 G/DL (ref 12–16)
HIV 1+2 AB+HIV1 P24 AG SERPL QL IA: NONREACTIVE
INDIRECT COOMBS GEL: NORMAL
MCH RBC QN AUTO: 29.4 PG (ref 27–31)
MCHC RBC AUTO-ENTMCNC: 32.6 G/DL (ref 33–36)
MCV RBC AUTO: 90.4 FL (ref 80–94)
NRBC BLD AUTO-RTO: 0 %
PLATELET # BLD AUTO: 316 X10(3)/MCL (ref 130–400)
PMV BLD AUTO: 9.8 FL (ref 7.4–10.4)
RBC # BLD AUTO: 3.94 X10(6)/MCL (ref 4.2–5.4)
RPR SER QL: NORMAL
RPR SER-TITR: NORMAL {TITER}
SPECIMEN OUTDATE: NORMAL
T PALLIDUM AB SER QL: REACTIVE
TSH SERPL-ACNC: 0.83 UIU/ML (ref 0.35–4.94)
WBC # SPEC AUTO: 9.56 X10(3)/MCL (ref 4.5–11.5)

## 2023-11-08 PROCEDURE — 86901 BLOOD TYPING SEROLOGIC RH(D): CPT | Performed by: OBSTETRICS & GYNECOLOGY

## 2023-11-08 PROCEDURE — 87389 HIV-1 AG W/HIV-1&-2 AB AG IA: CPT

## 2023-11-08 PROCEDURE — 36415 COLL VENOUS BLD VENIPUNCTURE: CPT

## 2023-11-08 PROCEDURE — 85027 COMPLETE CBC AUTOMATED: CPT

## 2023-11-08 PROCEDURE — 86780 TREPONEMA PALLIDUM: CPT

## 2023-11-08 PROCEDURE — 87077 CULTURE AEROBIC IDENTIFY: CPT

## 2023-11-08 PROCEDURE — 86762 RUBELLA ANTIBODY: CPT

## 2023-11-08 PROCEDURE — 86803 HEPATITIS C AB TEST: CPT

## 2023-11-08 PROCEDURE — 87086 URINE CULTURE/COLONY COUNT: CPT

## 2023-11-08 PROCEDURE — 86592 SYPHILIS TEST NON-TREP QUAL: CPT

## 2023-11-08 PROCEDURE — 81222 CFTR GENE DUP/DELET VARIANTS: CPT | Mod: 59

## 2023-11-08 PROCEDURE — 84443 ASSAY THYROID STIM HORMONE: CPT

## 2023-11-08 PROCEDURE — 87340 HEPATITIS B SURFACE AG IA: CPT

## 2023-11-08 PROCEDURE — 85660 RBC SICKLE CELL TEST: CPT

## 2023-11-08 PROCEDURE — 81511 FTL CGEN ABNOR FOUR ANAL: CPT

## 2023-11-09 LAB
# FETUSES: 1
2ND TRIMESTER 4 SCREEN SERPL-IMP: NORMAL
AFP ADJ MOM SERPL: 0.77 MOM
AFP SERPL IA-MCNC: 56.4 NG/ML
AGE AT DELIVERY: NORMAL
B-HCG ADJ MOM SERPL: 1.16 MOM
CHORION TYPE: NORMAL
COLLECT DATE: NORMAL
CURRENT SMOKER: NORMAL
FET TS 21 RISK FROM MAT AGE: NORMAL
GA METHOD: NORMAL
GA US.COMPOSITE.EST: NORMAL WK,D
HBV SURFACE AG SERPL QL IA: NONREACTIVE
HCG SERPL IA-ACNC: 26.1 IU/ML
HCV AB SERPL QL IA: NONREACTIVE
HGB S BLD QL SOLY: NEGATIVE
HX OF NTD QL: NO
HX OF NTD QL: NO
HX OF TRISOMY 21 QL: NO
IDDM PATIENT QL: NO
INHIBIN A ADJ MOM SERPL: 0.89 MOM
INHIBIN SERPL-MCNC: 152 PG/ML
IVF PREGNANCY: NO
LABORATORY COMMENT REPORT: NORMAL
M PHYSICIAN PHONE NUMBER: NORMAL
MATERNAL RISK FACTORS: NORMAL
NEURAL TUBE DEFECT RISK FETUS: NORMAL %
RECOM F/U: NORMAL
RUBV IGG SERPL IA-ACNC: 3.3
RUBV IGG SERPL QL IA: POSITIVE
TEST PERFORMANCE INFO SPEC: NORMAL
TS 18 RISK FETUS: NORMAL
TS 21 RISK FETUS: NORMAL
U ESTRIOL ADJ MOM SERPL: 0.93 MOM
U ESTRIOL SERPL-MCNC: 2.13 NG/ML

## 2023-11-10 LAB — BACTERIA UR CULT: ABNORMAL

## 2023-11-12 LAB — T PALLIDUM AB SER QL: REACTIVE

## 2023-11-25 ENCOUNTER — HOSPITAL ENCOUNTER (EMERGENCY)
Facility: HOSPITAL | Age: 23
Discharge: HOME | End: 2023-11-25
Attending: OBSTETRICS & GYNECOLOGY
Payer: MEDICAID

## 2023-11-25 VITALS
HEART RATE: 86 BPM | SYSTOLIC BLOOD PRESSURE: 129 MMHG | RESPIRATION RATE: 18 BRPM | HEIGHT: 65 IN | BODY MASS INDEX: 21.99 KG/M2 | WEIGHT: 132 LBS | OXYGEN SATURATION: 100 % | DIASTOLIC BLOOD PRESSURE: 70 MMHG | TEMPERATURE: 98 F

## 2023-11-25 PROCEDURE — 99284 EMERGENCY DEPT VISIT MOD MDM: CPT

## 2023-11-25 NOTE — Clinical Note
"Izzy Hollowaygemma" Hakeem was seen and treated in our emergency department on 11/25/2023.  She may return to work on 11/27/2023.       If you have any questions or concerns, please don't hesitate to call.      VIRIDIANA Tee RN    "

## 2023-11-26 NOTE — DISCHARGE INSTRUCTIONS
Discharge instructions given to patient and reviewed. Patient given time to ask questions and questions answered. Patient educated on when to return to hospital and importance of prenatal visits with primary OB. Patient verbally understands and agrees to discharge. Patient ambulated off unit to private vehicle.

## 2023-11-26 NOTE — ED TRIAGE NOTES
Patient presents with report of back pain. Denies cramps or abd pain. No vag bleeding or LOF. EFM applied with good fetal movement and regular heart tones noted.

## 2023-11-26 NOTE — ED PROVIDER NOTES
Encounter Date: 11/25/2023       History     Chief Complaint   Patient presents with    Back Pain     HPI  Review of patient's allergies indicates:  No Known Allergies  Past Medical History:   Diagnosis Date    Anemia, unspecified      Past Surgical History:   Procedure Laterality Date    LAPAROTOMY, EXPLORATORY N/A 2/19/2023    Procedure: Ex Laparatomy, Right Salpingectomy, Evacuation of Hemoperitoneum;  Surgeon: Shayy Barksdale MD;  Location: [facility];  Service: OB/GYN;  Laterality: N/A;    SALPINGECTOMY N/A 2/19/2023    Procedure: SALPINGECTOMY;  Surgeon: Shayy Barksdale MD;  Location: [facility];  Service: OB/GYN;  Laterality: N/A;     History reviewed. No pertinent family history.  Social History     Tobacco Use    Smoking status: Never    Smokeless tobacco: Never   Substance Use Topics    Alcohol use: Not Currently    Drug use: Never     Review of Systems    Physical Exam     Initial Vitals   BP Pulse Resp Temp SpO2   -- -- -- -- --      MAP       --         Physical Exam    ED Course   Procedures  Labs Reviewed - No data to display       Imaging Results    None          Medications - No data to display  Medical Decision Making                                 Clinical Impression:   Reports extreme back pain after heavy lifting at work  Currently 23wks gestation  Denies contractions, bleeding, or leakage of fluid  Denies urinary symptoms    Faison: no contractions  FHT: age appropriate    A/P: Muscle strain  -discharge home  -reassured  -precautions given     ED Disposition Condition    Discharge           ED Prescriptions    None       Follow-up Information       Follow up With Specialties Details Why Contact Info    Dennis Keenan Jr., MD Obstetrics and Gynecology Follow up  [address] [address]  [phone]               Juancarlos Miller MD  11/25/23 2054

## 2023-11-30 LAB
GENETIC VARIANT DETAILS BLD/T: NORMAL
GENETICIST REVIEW: NORMAL
LAB TEST METHOD: NORMAL
MOL DX INTERP BLD/T QL: NEGATIVE
PROVIDER SIGNING NAME: NORMAL
SPECIMEN SOURCE: NORMAL

## 2023-12-13 ENCOUNTER — LAB VISIT (OUTPATIENT)
Dept: LAB | Facility: HOSPITAL | Age: 23
End: 2023-12-13
Attending: OBSTETRICS & GYNECOLOGY
Payer: MEDICAID

## 2023-12-13 DIAGNOSIS — Z34.82 PRENATAL CARE, SUBSEQUENT PREGNANCY IN SECOND TRIMESTER: Primary | ICD-10-CM

## 2023-12-13 LAB
GLUCOSE 1H P 100 G GLC PO SERPL-MCNC: 103 MG/DL (ref 100–180)
HCT VFR BLD AUTO: 35 % (ref 37–47)
HGB BLD-MCNC: 11.3 G/DL (ref 12–16)

## 2023-12-13 PROCEDURE — 82950 GLUCOSE TEST: CPT

## 2023-12-13 PROCEDURE — 85014 HEMATOCRIT: CPT

## 2023-12-13 PROCEDURE — 36415 COLL VENOUS BLD VENIPUNCTURE: CPT

## 2024-01-05 DIAGNOSIS — R93.89 ABNORMAL ULTRASOUND: Primary | ICD-10-CM

## 2024-01-05 DIAGNOSIS — I51.7 ENLARGED RV (RIGHT VENTRICLE): ICD-10-CM

## 2024-01-10 ENCOUNTER — OFFICE VISIT (OUTPATIENT)
Dept: MATERNAL FETAL MEDICINE | Facility: CLINIC | Age: 24
End: 2024-01-10
Payer: MEDICAID

## 2024-01-10 ENCOUNTER — HOSPITAL ENCOUNTER (OUTPATIENT)
Facility: HOSPITAL | Age: 24
Discharge: HOME | End: 2024-01-11
Attending: OBSTETRICS & GYNECOLOGY | Admitting: OBSTETRICS & GYNECOLOGY
Payer: MEDICAID

## 2024-01-10 ENCOUNTER — DOCUMENTATION ONLY (OUTPATIENT)
Dept: MATERNAL FETAL MEDICINE | Facility: CLINIC | Age: 24
End: 2024-01-10

## 2024-01-10 ENCOUNTER — PROCEDURE VISIT (OUTPATIENT)
Dept: MATERNAL FETAL MEDICINE | Facility: CLINIC | Age: 24
End: 2024-01-10
Payer: MEDICAID

## 2024-01-10 VITALS
WEIGHT: 146.81 LBS | HEIGHT: 65 IN | HEART RATE: 120 BPM | BODY MASS INDEX: 24.46 KG/M2 | DIASTOLIC BLOOD PRESSURE: 78 MMHG | SYSTOLIC BLOOD PRESSURE: 140 MMHG

## 2024-01-10 DIAGNOSIS — O28.3 ABNORMAL PRENATAL ULTRASOUND: ICD-10-CM

## 2024-01-10 DIAGNOSIS — O35.BXX0 ANOMALY OF FETAL HEART AFFECTING SINGLETON PREGNANCY, ANTEPARTUM: ICD-10-CM

## 2024-01-10 DIAGNOSIS — O36.5931 INTRAUTERINE GROWTH RESTRICTION AFFECTING CARE OF MOTHER, ANTEPARTUM, THIRD TRIMESTER, FETUS 1: Primary | ICD-10-CM

## 2024-01-10 DIAGNOSIS — O36.5931 INTRAUTERINE GROWTH RESTRICTION AFFECTING CARE OF MOTHER, ANTEPARTUM, THIRD TRIMESTER, FETUS 1: ICD-10-CM

## 2024-01-10 DIAGNOSIS — O16.3 ELEVATED BLOOD PRESSURE AFFECTING PREGNANCY IN THIRD TRIMESTER, ANTEPARTUM: ICD-10-CM

## 2024-01-10 DIAGNOSIS — O36.5930 POOR FETAL GROWTH AFFECTING MANAGEMENT OF MOTHER IN THIRD TRIMESTER, SINGLE OR UNSPECIFIED FETUS: Primary | ICD-10-CM

## 2024-01-10 DIAGNOSIS — R93.89 ABNORMAL ULTRASOUND: ICD-10-CM

## 2024-01-10 DIAGNOSIS — O35.BXX0: ICD-10-CM

## 2024-01-10 PROBLEM — K66.1 RUPTURED RIGHT TUBAL ECTOPIC PREGNANCY CAUSING HEMOPERITONEUM: Status: RESOLVED | Noted: 2023-02-20 | Resolved: 2024-01-10

## 2024-01-10 PROBLEM — O00.101 RUPTURED RIGHT TUBAL ECTOPIC PREGNANCY CAUSING HEMOPERITONEUM: Status: RESOLVED | Noted: 2023-02-20 | Resolved: 2024-01-10

## 2024-01-10 LAB
ALBUMIN SERPL-MCNC: 3.3 G/DL (ref 3.5–5)
ALBUMIN/GLOB SERPL: 1 RATIO (ref 1.1–2)
ALP SERPL-CCNC: 146 UNIT/L (ref 40–150)
ALT SERPL-CCNC: 11 UNIT/L (ref 0–55)
AST SERPL-CCNC: 18 UNIT/L (ref 5–34)
BASOPHILS # BLD AUTO: 0.03 X10(3)/MCL
BASOPHILS NFR BLD AUTO: 0.2 %
BILIRUB SERPL-MCNC: 0.3 MG/DL
BUN SERPL-MCNC: 6.9 MG/DL (ref 7–18.7)
CALCIUM SERPL-MCNC: 8.9 MG/DL (ref 8.4–10.2)
CHLORIDE SERPL-SCNC: 109 MMOL/L (ref 98–107)
CO2 SERPL-SCNC: 21 MMOL/L (ref 22–29)
CREAT SERPL-MCNC: 0.77 MG/DL (ref 0.55–1.02)
EOSINOPHIL # BLD AUTO: 0.02 X10(3)/MCL (ref 0–0.9)
EOSINOPHIL NFR BLD AUTO: 0.1 %
ERYTHROCYTE [DISTWIDTH] IN BLOOD BY AUTOMATED COUNT: 12.9 % (ref 11.5–17)
GFR SERPLBLD CREATININE-BSD FMLA CKD-EPI: >60 MLS/MIN/1.73/M2
GLOBULIN SER-MCNC: 3.4 GM/DL (ref 2.4–3.5)
GLUCOSE SERPL-MCNC: 67 MG/DL (ref 74–100)
HCT VFR BLD AUTO: 38.2 % (ref 37–47)
HGB BLD-MCNC: 12 G/DL (ref 12–16)
IMM GRANULOCYTES # BLD AUTO: 0.09 X10(3)/MCL (ref 0–0.04)
IMM GRANULOCYTES NFR BLD AUTO: 0.7 %
LDH SERPL-CCNC: 262 U/L (ref 125–220)
LYMPHOCYTES # BLD AUTO: 1.89 X10(3)/MCL (ref 0.6–4.6)
LYMPHOCYTES NFR BLD AUTO: 14.2 %
MCH RBC QN AUTO: 29.6 PG (ref 27–31)
MCHC RBC AUTO-ENTMCNC: 31.4 G/DL (ref 33–36)
MCV RBC AUTO: 94.1 FL (ref 80–94)
MONOCYTES # BLD AUTO: 1.05 X10(3)/MCL (ref 0.1–1.3)
MONOCYTES NFR BLD AUTO: 7.9 %
NEUTROPHILS # BLD AUTO: 10.26 X10(3)/MCL (ref 2.1–9.2)
NEUTROPHILS NFR BLD AUTO: 76.9 %
NRBC BLD AUTO-RTO: 0 %
PLATELET # BLD AUTO: 290 X10(3)/MCL (ref 130–400)
PMV BLD AUTO: 11.3 FL (ref 7.4–10.4)
POTASSIUM SERPL-SCNC: 3.9 MMOL/L (ref 3.5–5.1)
PROT SERPL-MCNC: 6.7 GM/DL (ref 6.4–8.3)
RBC # BLD AUTO: 4.06 X10(6)/MCL (ref 4.2–5.4)
SODIUM SERPL-SCNC: 138 MMOL/L (ref 136–145)
URATE SERPL-MCNC: 3.5 MG/DL (ref 2.6–6)
WBC # SPEC AUTO: 13.34 X10(3)/MCL (ref 4.5–11.5)

## 2024-01-10 PROCEDURE — 76811 OB US DETAILED SNGL FETUS: CPT | Mod: S$GLB,,, | Performed by: OBSTETRICS & GYNECOLOGY

## 2024-01-10 PROCEDURE — 84550 ASSAY OF BLOOD/URIC ACID: CPT | Performed by: OBSTETRICS & GYNECOLOGY

## 2024-01-10 PROCEDURE — 76820 UMBILICAL ARTERY ECHO: CPT | Mod: S$GLB,,, | Performed by: OBSTETRICS & GYNECOLOGY

## 2024-01-10 PROCEDURE — 96372 THER/PROPH/DIAG INJ SC/IM: CPT | Performed by: OBSTETRICS & GYNECOLOGY

## 2024-01-10 PROCEDURE — 99205 OFFICE O/P NEW HI 60 MIN: CPT | Mod: TH,S$GLB,, | Performed by: OBSTETRICS & GYNECOLOGY

## 2024-01-10 PROCEDURE — 76819 FETAL BIOPHYS PROFIL W/O NST: CPT | Mod: S$GLB,,, | Performed by: OBSTETRICS & GYNECOLOGY

## 2024-01-10 PROCEDURE — 80053 COMPREHEN METABOLIC PANEL: CPT | Performed by: OBSTETRICS & GYNECOLOGY

## 2024-01-10 PROCEDURE — 63600175 PHARM REV CODE 636 W HCPCS: Performed by: OBSTETRICS & GYNECOLOGY

## 2024-01-10 PROCEDURE — G0378 HOSPITAL OBSERVATION PER HR: HCPCS

## 2024-01-10 PROCEDURE — 3078F DIAST BP <80 MM HG: CPT | Mod: CPTII,S$GLB,, | Performed by: OBSTETRICS & GYNECOLOGY

## 2024-01-10 PROCEDURE — 3008F BODY MASS INDEX DOCD: CPT | Mod: CPTII,S$GLB,, | Performed by: OBSTETRICS & GYNECOLOGY

## 2024-01-10 PROCEDURE — 83615 LACTATE (LD) (LDH) ENZYME: CPT | Performed by: OBSTETRICS & GYNECOLOGY

## 2024-01-10 PROCEDURE — 3077F SYST BP >= 140 MM HG: CPT | Mod: CPTII,S$GLB,, | Performed by: OBSTETRICS & GYNECOLOGY

## 2024-01-10 PROCEDURE — 85025 COMPLETE CBC W/AUTO DIFF WBC: CPT | Performed by: OBSTETRICS & GYNECOLOGY

## 2024-01-10 PROCEDURE — G0379 DIRECT REFER HOSPITAL OBSERV: HCPCS

## 2024-01-10 RX ORDER — BETAMETHASONE SODIUM PHOSPHATE AND BETAMETHASONE ACETATE 3; 3 MG/ML; MG/ML
12 INJECTION, SUSPENSION INTRA-ARTICULAR; INTRALESIONAL; INTRAMUSCULAR; SOFT TISSUE
Status: COMPLETED | OUTPATIENT
Start: 2024-01-10 | End: 2024-01-11

## 2024-01-10 RX ORDER — BETAMETHASONE SODIUM PHOSPHATE AND BETAMETHASONE ACETATE 3; 3 MG/ML; MG/ML
6 INJECTION, SUSPENSION INTRA-ARTICULAR; INTRALESIONAL; INTRAMUSCULAR; SOFT TISSUE
Status: DISCONTINUED | OUTPATIENT
Start: 2024-01-10 | End: 2024-01-10

## 2024-01-10 RX ADMIN — BETAMETHASONE SODIUM PHOSPHATE AND BETAMETHASONE ACETATE 12 MG: 3; 3 INJECTION, SUSPENSION INTRA-ARTICULAR; INTRALESIONAL; INTRAMUSCULAR at 01:01

## 2024-01-10 NOTE — PROGRESS NOTES
Maternal Fetal Medicine Consult    Subjective     Patient ID: [id number]    Chief Complaint: MFM CONSULT W/US (SUBOPTIMAL HEART VIEW, ENLARGED VENTRICLE ON RIGHT)      HPI: 23 y.o.  female  at 29w4d gestation with Estimated Date of Delivery: 3/23/24 by LMP, consistent with early US. She is sent for MFM consultation for suboptimal fetal heart views.    She had abnormal four-chamber heart views on outside ultrasound that were suboptimal.  She had negative quad screen with DSR 1:1100.  She has history of a ruptured ectopic pregnancy in 2023 requiring intensive care.  Patient was a little anxious when she arrived to the office.  Reports uncomplicated pregnancy so far.  She was accompanied by her aunt Ann.       She denies any personal or family history of aneuploidy or anomalies. She denies any exposure to high fevers, viral rashes, illicit drugs or alcohol in this pregnancy.  She denies any leaking fluid, vaginal bleeding, contractions, decreased fetal movement. Denies headaches, visual disturbances, or epigastric pain.       Pregnancy complications include:  Patient Active Problem List   Diagnosis   (none) - all problems resolved or deleted        Past Medical History:   Diagnosis Date    2023    NOT SURE IF CURRENT OR NOT       Past Surgical History:   Procedure Laterality Date    LAPAROTOMY, EXPLORATORY N/A 2023    Procedure: Ex Laparatomy, Right Salpingectomy, Evacuation of Hemoperitoneum;  Surgeon: Shayy Barksdale MD;  Location: [facility];  Service: OB/GYN;  Laterality: N/A;    SALPINGECTOMY N/A 2023    Procedure: SALPINGECTOMY;  Surgeon: Shayy Barksdale MD;  Location: [facility];  Service: OB/GYN;  Laterality: N/A;       Family History   Problem Relation Age of Onset    Diabetes Paternal Grandmother     Stroke Maternal Grandmother     Hypertension Maternal Grandmother     Diabetes Maternal Grandfather        Social History     Socioeconomic History    Marital status: Single  "  Tobacco Use    Smoking status: Never     Passive exposure: Never    Smokeless tobacco: Never   Substance and Sexual Activity    Alcohol use: Not Currently    Drug use: Never    Sexual activity: Yes     Partners: Male       Current Outpatient Medications   Medication Sig Dispense Refill    PNV no.153/FA/om3/dha/epa/fish (PRENATAL GUMMIES ORAL) Take by mouth.      oxyCODONE (ROXICODONE) 5 MG immediate release tablet Take 1 tablet (5 mg total) by mouth every 4 (four) hours as needed for Pain. (Patient not taking: Reported on 1/10/2024) 15 tablet 0     No current facility-administered medications for this visit.       Review of patient's allergies indicates:  No Known Allergies    Medications:  Current Outpatient Medications   Medication Instructions    oxyCODONE (ROXICODONE) 5 mg, Oral, Every 4 hours PRN    PNV no.153/FA/om3/dha/epa/fish (PRENATAL GUMMIES ORAL) Oral       Review of Systems   12 point review of systems conducted, negative except as stated in the history of present illness. See HPI for details.      Objective     Visit Vitals  BP (!) 140/78 (BP Location: Left arm, Patient Position: Sitting, BP Method: Large (Manual))   Pulse (!) 137   Ht 5' 5" (1.651 m)   Wt 66.6 kg (146 lb 12.8 oz)   LMP 02/21/2023 (Approximate)   BMI 24.43 kg/m²        Physical Exam  Vitals and nursing note reviewed.   Constitutional:       General: She is not in acute distress.     Appearance: Normal appearance.   HENT:      Head: Normocephalic and atraumatic.      Nose: Nose normal. No congestion.      Mouth/Throat:      Pharynx: Oropharynx is clear.   Eyes:      General: No scleral icterus.     Pupils: Pupils are equal, round, and reactive to light.   Cardiovascular:      Rate and Rhythm: Normal rate and regular rhythm.   Pulmonary:      Effort: No respiratory distress.      Breath sounds: Normal breath sounds. No wheezing.   Abdominal:      General: Abdomen is flat.      Palpations: Abdomen is soft.      Tenderness: There is no " abdominal tenderness. There is no right CVA tenderness, left CVA tenderness or guarding.      Comments: No CVA tenderness gravid uterus.    Musculoskeletal:         General: Normal range of motion.      Cervical back: Neck supple.      Right lower leg: No edema.      Left lower leg: No edema.   Skin:     General: Skin is warm.      Findings: No bruising or rash.   Neurological:      General: No focal deficit present.      Mental Status: She is oriented to person, place, and time.      Deep Tendon Reflexes: Reflexes normal.      Comments: Normal reflexes   Psychiatric:         Mood and Affect: Mood normal.         Behavior: Behavior normal.         Thought Content: Thought content normal.         Judgment: Judgment normal.         ASSESSMENT/PLAN:     23 y.o.  female with IUP at 29w4d          Fetal growth restriction with abnormal umbilical artery Doppler  There is early onset fetal growth restriction with an EFW of 939 g at the 9% and the AC at the 2%. on 1/10/2024.  AFV is normal. BPP is 8/8.  Abnormal umbilical artery Doppler with elevated S/D ratio at 4.3.    I discussed potential etiologies of FGR include but are not limited to normal variation of stature, placental insufficiency, chromosomal abnormalities, genetic disorders, infections, medical conditions, teratogen exposure and other etiologies.  We discussed the increased risk of stillbirth and the potential need for earlier delivery.The potential for constitutional factor as a contributing factor was addressed in addition to other contributing factors such as conditions predisposing to vascular disease such as pregestational diabetes, chronic renal disease, autoimmune disorders; umbilical cord abnormalities; substance use; and multiple gestation. Although uteroplacental insufficiency and/or constitutional factors (if relevant) are the likely etiology, the potential for anomalies not seen with the ultrasound, aneuploidy, or in-utero viral infections  are there, but these are very unlikely to be the cause with no other ultrasound findings. In a recent study from 2018, clinically significant chromosomal microarray aberrations were seen in 4.7% of pregnancies with US anomalies (excluding soft markers for aneuploidy), but including patients with isolated FGR and polyhydramnios. The most common abnormality seen with cardiovascular defects was 22q11.21 deletion (DiGeorge-velocardiofacial syndrome), and the most common abnormality among genitourinary malformations was 17q12 deletion (encompassing HFNF1B). Aberrations were present in 13.1% if multiple anomalies, and around 4 % if single ultrasonographic anomaly.      I discussed and offered genetic amniocentesis, to check for microarray and CMV was offered. The benefits and risks were discussed. She accepted amniocentesis, which will be scheduled 24.  We will forward the results as soon as available.. She was advised of need for  evaluation.    I also discussed with them that cell free DNA will not detect other genetic diseases or more subtle chromosomal abnormalities like microdeletions or duplications. The added benefit of doing chromosomal microarray analysis on amniotic fluid is that it would detect clinically relevant deletions or duplications in about 1:60 (1.7%) when the indication is abnormal quad screen or advanced maternal age, and 6.0 % when a structural anomaly is present. The concern about microarray analysis is that it could give uncertain findings in about 1.5-2.0% of cases that would increase anxiety and may require specialized genetic counseling.     She was counseled on Cell free DNA understanding that it is an enhanced screening test but not a diagnostic test. It assesses risk for common aneuploidies such as Trisomy 13, 18, and 21 by evaluating cell-free fetal DNA in maternal circulation with a false positive rate less than 0.5% for Trisomy 21 and less reliable for Trisomy 13 and Trisomy  18. She already did quad screen that was negative.    Complications of growth-restricted neonates include hypoglycemia, hyperbilirubinemia, hypothermia, seizures, sepsis, IVH, RDS, necrotizing enterocolitis, and  asphyxia. Long-term complications include cognitive delay and adult diseases such as cardiovascular disorders and type 2 diabetes. There is an increased risk (~20%) for recurrence of fetal growth restriction in a future pregnancy.    Because of increased risk of stillbirth, she was advised that in this situation we need to monitor interval fetal growth every 3 weeks and do twice weekly fetal testing, including an NST at least once per week. She was instructed that fetal testing is not a 100% protective against the risk of fetal demise in-utero. The importance of doing fetal kick counts three times a day and resting in a lateral recumbent position and reporting immediately at any time there is any decreased fetal movement even if the same day of testing was emphasized. Her questions were answered.    Delivery is not indicated at this time, as risks of  mortality and morbidity with delivery, outweigh risks with continued pregnancy. Likewise, with stable condition, or too early a gestational age, steroids (and magnesium sulfate) are not recommended, as benefit is reaped only if suspected imminent delivery in few days which, although possible, is very unlikely at this time. Plan delivery as below.  Earlier delivery may be needed if worsening fetal growth, abnormal doppler, or abnormal fetal testing or other concerns.       Elevated blood pressure in pregnancy  BP Readings from Last 1 Encounters:   01/10/24 (!) 140/78   She had 2 additional readings of 152/89 and 155/90.    She denies any history of underlying hypertension.  On review of chart, the only elevated blood pressure readings seen were at the time of the ruptured ectopic pregnancy when she was in the ICU.  There were no elevations on  prenatal record prior to 20 weeks to support a diagnosis of chronic hypertension.    She is asymptomatic.  Out of caution, we will send the patient to the hospital for observation with blood pressure monitoring, fetal testing, preeclampsia labs, and 24 hour urine.  If stable condition and no evidence of severe preeclampsia, patient could be discharged with close outpatient follow-up.    Discussed risks with hypertension in pregnancy, including FGR, abruption and preeclampsia/eclampsia. Advised of low sodium diet avoiding any excessive weight gain.     Discussed with the patient need for twice weekly follow-up with at least weekly labs, with more frequent labs if worsening blood pressure or clinical condition.  Patient has a blood pressure machine at home and will be checking BP 2-3 times a day.  She was given instructions to come in immediately if she has decreased fetal movements, headaches, vision problems, or epigastric pain.  She is also to come in if blood pressure is over 160 systolic or 110 diastolic.      If confirmed gestational hypertension, along with fetal growth restriction, delivery is recommended no later than 34 weeks gestation (34 - 34 6/7 weeks) as risks of prematurity are outweighed by risks of continued pregnancy.  Earlier delivery maybe needed for worsening hypertension or severe preeclampsia.  We will plan for her to receive a course of steroids prior to delivery.  Preeclampsia precautions given.        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    Patient was advised that this is serious condition that would require multiple surgeries and needs further evaluation and characterization of abnormality by pediatric Cardiology.  Offered her to have an amniocentesis that we will be done tomorrow.  The possibility of aneuploidy or abnormal microarray analysis is higher with the presence of fetal growth restriction and congenital heart disease.  Questions were answered.   Informed consent was obtained verbally and we will get written consent in the hospital.    Discussed with the patient that once a final characterization of the cardiac lesion is made, delivery at a tertiary facility like in [place] with pediatric Cardiothoracic surgery we would be beneficial.  We will get fetal echocardiogram as soon as possible and follow      Tachycardia  Heart rate was elevated on initial check at 140. Patient reported she was nervous for the visit. On recheck, heart rate was improved to 120.  She is asymptomatic.  She will be going to the hospital for monitoring as above.    No follow-ups on file.     Future Appointments   Date Time Provider Department Center   8/8/2024  8:10 AM Dali Jose, ANP [facility]        Patient has combination of congenital heart disease with fetal growth restriction that would increase the risk of aneuploidy.  We will plan to do an amniocentesis.  With elevated blood pressures, patient will be sent for overnight observation with the hospital to get a course of steroids monitor for any signs of severe features of nonreassuring fetal status.  Delivery is not indicated at this time as the increased risk of prematurity outweighed the risk of continued pregnancy.  Magnesium sulfate will not be given as it is unlikely that the patient will need imminent delivery. Patient's questions were answered.  She is in agreement with plan of care.      Patient was evaluated by LINDA Montano and Dr. Polladr.  Final assessment and recommendations as stated above were made by Dr. Polladr.    This note was created with the assistance of [employer] voice recognition software. There may be transcription errors as a result of using this technology, however minimal. Effort has been made to ensure accuracy of transcription, but any obvious errors or omissions should be clarified with the author of the document.

## 2024-01-11 VITALS
RESPIRATION RATE: 18 BRPM | SYSTOLIC BLOOD PRESSURE: 114 MMHG | TEMPERATURE: 99 F | OXYGEN SATURATION: 98 % | DIASTOLIC BLOOD PRESSURE: 60 MMHG | HEART RATE: 104 BPM

## 2024-01-11 PROBLEM — O36.5931 INTRAUTERINE GROWTH RESTRICTION AFFECTING CARE OF MOTHER, ANTEPARTUM, THIRD TRIMESTER, FETUS 1: Status: ACTIVE | Noted: 2024-01-11

## 2024-01-11 LAB
CREAT 24H UR-MCNC: 1288.3 MG/DAY (ref 950–2490)
CREAT UR-MCNC: 73.2 MG/DL (ref 45–106)
PROT 24H UR-MCNC: NORMAL G/DL
PROT UR STRIP-MCNC: <6.8 MG/DL
TOTAL VOLUME  (OHS): 1760 ML
TOTAL VOLUME  (OHS): 1760 ML

## 2024-01-11 PROCEDURE — 87496 CYTOMEG DNA AMP PROBE: CPT | Mod: 59 | Performed by: OBSTETRICS & GYNECOLOGY

## 2024-01-11 PROCEDURE — 96372 THER/PROPH/DIAG INJ SC/IM: CPT | Mod: 59 | Performed by: OBSTETRICS & GYNECOLOGY

## 2024-01-11 PROCEDURE — 59000 AMNIOCENTESIS DIAGNOSTIC: CPT | Mod: ,,, | Performed by: OBSTETRICS & GYNECOLOGY

## 2024-01-11 PROCEDURE — 81229 CYTOG ALYS CHRML ABNR SNPCGH: CPT | Performed by: OBSTETRICS & GYNECOLOGY

## 2024-01-11 PROCEDURE — G0378 HOSPITAL OBSERVATION PER HR: HCPCS

## 2024-01-11 PROCEDURE — 59000 AMNIOCENTESIS DIAGNOSTIC: CPT

## 2024-01-11 PROCEDURE — 76946 ECHO GUIDE FOR AMNIOCENTESIS: CPT | Mod: 26,,, | Performed by: OBSTETRICS & GYNECOLOGY

## 2024-01-11 PROCEDURE — 63600175 PHARM REV CODE 636 W HCPCS: Performed by: OBSTETRICS & GYNECOLOGY

## 2024-01-11 PROCEDURE — 82013 ACETYLCHOLINESTERASE ASSAY: CPT | Performed by: OBSTETRICS & GYNECOLOGY

## 2024-01-11 PROCEDURE — 84156 ASSAY OF PROTEIN URINE: CPT | Performed by: OBSTETRICS & GYNECOLOGY

## 2024-01-11 PROCEDURE — 82570 ASSAY OF URINE CREATININE: CPT | Performed by: OBSTETRICS & GYNECOLOGY

## 2024-01-11 RX ADMIN — BETAMETHASONE SODIUM PHOSPHATE AND BETAMETHASONE ACETATE 12 MG: 3; 3 INJECTION, SUSPENSION INTRA-ARTICULAR; INTRALESIONAL; INTRAMUSCULAR at 02:01

## 2024-01-11 NOTE — H&P
[facility]                       [address] [place] [id number]    PATIENT NAME:       HARJIT VÁSQUEZ  YOB: 2000  CSN:                [id number]   MRN:                [id number]  ADMIT DATE:         01/10/2024 13:12:00  PHYSICIAN:          Dennis Keenan Jr, MD                        HISTORY AND PHYSICAL      HISTORY OF PRESENT ILLNESS:  A 23-year-old  2, para 0, with pregnancy at   29 weeks and 4 days, admitted from Dr. Samuel Pollard' office secondary to elevated   blood pressures.  The patient was also noted to have an anatomy scan early in   pregnancy that had some suboptimal findings with questionable left ventricle   hypoplasia.  It was confirmed today at Dr. Samuel Pollard that there is also left   ventricular hypoplasia, as well as blood pressures as high as 150s/90s.  She was   sent to Obstetric Unit today for evaluation of the elevated blood pressures.    She is without complaints.  Denies headaches or blurry vision.  Reports good   fetal movement.    PHYSICAL EXAMINATION:  VITALS:  On admission; 149/88, respirations 18, temperature 98.7.  HEART:  S1 and S2.  No murmurs.  LUNGS:  Clear.  ABDOMEN:  Soft, nontender.  EXTREMITIES:  Had trace lower extremity edema.  GENITOURINARY:  External genitalia were normal.  Heart tones are 150s, positive   acceleration.  No deceleration.  Tocometer showed no contractions.    ASSESSMENT:  Pregnancy at 29 weeks and 4 days gestation, hypertension,   hypoplastic left heart.    PLAN:  Admit for evaluation of blood pressures.        ______________________________  Dennis Keenan Jr, MD    DJE/AQS  DD:  01/10/2024  Time:  04:43PM  DT:  01/10/2024  Time:  07:46PM  Job #:  [id number]      HISTORY AND PHYSICAL

## 2024-01-11 NOTE — PROCEDURES
Ultrasound guided amniocentesis report    Date of exam: 2024     Indication: FGR and fetal anomalies congenital heart disease      After obtaining appropriate consent discussing the benefits and risks of an amniocentesis at this gestational age, including remote risk of need for emergency delivery with all the complications of prematurity, with increased  morbidity and mortality, the patient underwent a pre-amnio ultrasound that showed the fetus to be in a cephalic and longitudinal lie.  A pocket of fluid was identified in the fundal area of uterusarea of the uterus free of fetal parts.  Using continuous ultrasound guidance, and using sterile technique with ChloraPrep solution to prep the abdomen, a trans-abdominal amniocentesis was done uneventfully, using a 20  gauge needle, and with (as the placenta was anterior, and there was no way to avoid placenta) crossing the placenta, discarding the first 2 cc ofclear fluid and sending about 18 cc of fluid for microarray analysis, Acetylcholine esterase, and CMV.  Blood type is Rh+ and no RhoGam is needed.  Post amnio fetal heart rate rate was normal and patient tolerated procedure well was given the usual post amnio instructions.  Will watch for about half an hour and if normal heart rate and no significant contractions will discharge home with above instructions.  She will keep her follow-up with her OB.        Components of this note were documented using voice recognition systems; and are subject to errors not corrected at proof reading.  Please contact the author for any clarifications.

## 2024-01-11 NOTE — CONSULTS
The Izzy Palacio is a 23 y.o.  female  at 29w5d  gestation who was admitted to the hospital for fetal growth restriction with abnormal umbilical artery Doppler and elevated blood pressure. 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 She also had elevated S/D ratio on umbilical artery Doppler. She had negative quad screen with DSR 1:1100. She denies any history of hypertension in the past. She was sent to the hospital for blood pressure monitoring, fetal testing, and preeclampsia labs.  After thorough counseling on options for prenatal genetic testing, she also accepted amniocentesis.  Preeclampsia labs on 1/10/2024 showed platelets 290, creatinine 0.77, uric acid 3.5, AST 18, ALT 11, .  24 hour urine is pending.  She has had normal blood pressures since arriving to the hospital.  She denies leaking of fluid, vaginal bleeding, contractions, or decreased fetal movement. She denies headaches, visual disturbances, or epigastric pain.    Review of Systems   12 point review of systems conducted, negative except as stated in the history of present illness. See HPI for details.    Past Medical History:   Diagnosis Date    2023    NOT SURE IF CURRENT OR NOT        Past Surgical History:   Procedure Laterality Date    LAPAROTOMY, EXPLORATORY N/A 2023    Procedure: Ex Laparatomy, Right Salpingectomy, Evacuation of Hemoperitoneum;  Surgeon: Shayy Barksdale MD;  Location: [facility];  Service: OB/GYN;  Laterality: N/A;    SALPINGECTOMY N/A 2023    Procedure: SALPINGECTOMY;  Surgeon: Shayy Barksdale MD;  Location: [facility];  Service: OB/GYN;  Laterality: N/A;        Social Connections: Not on file        Family History   Problem Relation Age of Onset    Diabetes Paternal Grandmother     Stroke Maternal Grandmother     Hypertension Maternal Grandmother     Diabetes Maternal  Grandfather         Temp:  [98.2 °F (36.8 °C)-99.5 °F (37.5 °C)] 98.8 °F (37.1 °C)  Pulse:  [] 107  Resp:  [17-20] 17  SpO2:  [91 %-100 %] 97 %  BP: (113-155)/(53-90) 115/58    Physical Exam  Vitals and nursing note reviewed.   Constitutional:       Appearance: Normal appearance.   HENT:      Head: Normocephalic.   Pulmonary:      Effort: Pulmonary effort is normal.   Abdominal:      Palpations: Abdomen is soft.      Comments: Gravid uterus, no uterine tenderness   Musculoskeletal:      Cervical back: Neck supple.      Right lower leg: No edema.      Left lower leg: No edema.   Skin:     General: Skin is dry.   Neurological:      Mental Status: She is alert and oriented to person, place, and time.      Deep Tendon Reflexes: Reflexes normal.   Psychiatric:         Mood and Affect: Mood normal.         Thought Content: Thought content normal.         Judgment: Judgment normal.          Recent Results (from the past 48 hour(s))   Comprehensive Metabolic Panel    Collection Time: 01/10/24  1:38 PM   Result Value Ref Range    Sodium Level 138 136 - 145 mmol/L    Potassium Level 3.9 3.5 - 5.1 mmol/L    Chloride 109 (H) 98 - 107 mmol/L    Carbon Dioxide 21 (L) 22 - 29 mmol/L    Glucose Level 67 (L) 74 - 100 mg/dL    Blood Urea Nitrogen 6.9 (L) 7.0 - 18.7 mg/dL    Creatinine 0.77 0.55 - 1.02 mg/dL    Calcium Level Total 8.9 8.4 - 10.2 mg/dL    Protein Total 6.7 6.4 - 8.3 gm/dL    Albumin Level 3.3 (L) 3.5 - 5.0 g/dL    Globulin 3.4 2.4 - 3.5 gm/dL    Albumin/Globulin Ratio 1.0 (L) 1.1 - 2.0 ratio    Bilirubin Total 0.3 <=1.5 mg/dL    Alkaline Phosphatase 146 40 - 150 unit/L    Alanine Aminotransferase 11 0 - 55 unit/L    Aspartate Aminotransferase 18 5 - 34 unit/L    eGFR >60 mls/min/1.73/m2   Uric Acid    Collection Time: 01/10/24  1:38 PM   Result Value Ref Range    Uric Acid 3.5 2.6 - 6.0 mg/dL   Lactate Dehydrogenase    Collection Time: 01/10/24  1:38 PM   Result Value Ref Range    Lactate Dehydrogenase 262 (H)  125 - 220 U/L   CBC with Differential    Collection Time: 01/10/24  1:38 PM   Result Value Ref Range    WBC 13.34 (H) 4.50 - 11.50 x10(3)/mcL    RBC 4.06 (L) 4.20 - 5.40 x10(6)/mcL    Hgb 12.0 12.0 - 16.0 g/dL    Hct 38.2 37.0 - 47.0 %    MCV 94.1 (H) 80.0 - 94.0 fL    MCH 29.6 27.0 - 31.0 pg    MCHC 31.4 (L) 33.0 - 36.0 g/dL    RDW 12.9 11.5 - 17.0 %    Platelet 290 130 - 400 x10(3)/mcL    MPV 11.3 (H) 7.4 - 10.4 fL    Neut % 76.9 %    Lymph % 14.2 %    Mono % 7.9 %    Eos % 0.1 %    Basophil % 0.2 %    Lymph # 1.89 0.6 - 4.6 x10(3)/mcL    Neut # 10.26 (H) 2.1 - 9.2 x10(3)/mcL    Mono # 1.05 0.1 - 1.3 x10(3)/mcL    Eos # 0.02 0 - 0.9 x10(3)/mcL    Baso # 0.03 <=0.2 x10(3)/mcL    IG# 0.09 (H) 0 - 0.04 x10(3)/mcL    IG% 0.7 %    NRBC% 0.0 %        ED US Pelvis OB  LINDA Holt     2023  8:55 PM  ED US Pelvis OB    Date/Time: 2023 8:51 PM  Performed by: LINDA Holt  Authorized by: J Luis Blackburn DO     Indication:  Pregnancy  Identified Structures:  Uterus transverse and Uterus sagittal  Definitive IUP:  Indeterminant  Uterine Contents:  Other (too much bowel gas to visualize uterus/contents)  Impression:  No definitive IUP  Charge?:  No (educational)      Plan    29w5d with:    Fetal growth restriction with abnormal umbilical artery Doppler  There is early onset fetal growth restriction with an EFW of 939 g at the 9% and the AC at the 2%. on 1/10/2024.  AFV is normal and BPP is 8/8 on 1/10/24.  Abnormal umbilical artery Doppler with elevated S/D ratio at 4.3 on 1/10/24.     She accepted amniocentesis which was done today after informed consent was obtained. She declined cfDNA  and already did quad screen that was negative. She was advised of need for  evaluation.    Potential etiologies of FGR include but are not limited to normal variation of stature, placental insufficiency, chromosomal abnormalities, genetic disorders, infections, medical conditions, teratogen exposure and other  etiologies.      Complications of growth-restricted neonates include hypoglycemia, hyperbilirubinemia, hypothermia, seizures, sepsis, IVH, RDS, necrotizing enterocolitis, and  asphyxia. Long-term complications include cognitive delay and adult diseases such as cardiovascular disorders and type 2 diabetes. There is an increased risk (~20%) for recurrence of fetal growth restriction in a future pregnancy.    Because of increased risk of stillbirth, will monitor interval fetal growth every 3 weeks and do twice weekly fetal testing, including an NST at least once per week. She was previously instructed that fetal testing is not a 100% protective against the risk of fetal demise in-utero. Reviewed the importance of doing fetal kick counts three times a day and resting in a lateral recumbent position and reporting immediately at any time there is any decreased fetal movement even if the same day of testing was emphasized.     Delivery is not indicated at this time, as risks of  mortality and morbidity with delivery, outweigh risks with continued pregnancy. Likewise, with stable condition, or too early a gestational age, steroids (and magnesium sulfate) are not recommended, as benefit is reaped if suspected imminent delivery in few days which, although possible, is very unlikely at this time.  Delivery timing will be determined by ongoing clinical assessment.  Earlier delivery may be needed if worsening fetal growth, abnormal doppler, or abnormal fetal testing or other concerns.     We will plan to do twice weekly fetal testing with NSTs on  and biophysical profile Doppler on  with the us.        Elevated blood pressure x1 in pregnancy with no confirmation of gestational hypertension with just labile blood pressure.  All preeclampsia labs are reassuring  She denies any history of underlying hypertension.  On review of chart, the only elevated blood pressure readings seen were at the time  of the ruptured ectopic pregnancy when she was in the ICU.  There were no elevations on prenatal record prior to 20 weeks to support a diagnosis of chronic hypertension.     She is asymptomatic.  Preeclampsia labs on 1/10/2024 showed platelets 290, creatinine 0.77, uric acid 3.5, AST 18, ALT 11, .  24 hour urine is pending.  She has had normal blood pressures since arriving to the hospital.      Discussed risks with hypertension in pregnancy, including FGR, abruption and preeclampsia/eclampsia. Advised of low sodium diet avoiding any excessive weight gain.      With no recurrence in elevations, and reassuring fetal testing, patient could be discharged home today with close outpatient follow-up.        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  Patient was advised that this is serious condition that would require multiple surgeries and needs further evaluation and characterization of abnormality by pediatric Cardiology.  Offered her to have an amniocentesis which was done today after informed consent obtained. The possibility of aneuploidy or abnormal microarray analysis is higher with the presence of fetal growth restriction and congenital heart disease.  Questions were answered.      Discussed with the patient that once a final characterization of the cardiac lesion is made, delivery at a tertiary facility like in [place] with pediatric Cardiothoracic surgery we would be beneficial.  She is scheduled for fetal echocardiogram tomorrow.  We will follow-up on result.    Patient has an appointment with fetal echocardiogram tomorrow.      VTE prophylaxis  Discussed risks of thrombus with prolonged bedrest. Discussed risks/benefits of SCD use vs frequent ROM and exercise of BLE while in bed to decrease risk of DVT with or without Keo stocking.  Agreed to use frequent movement of BLEs.  Advised to immediately report BLE discomfort, swelling, hotness tenderness. Expectant  management    Risks, benefits, alternatives and possible complications have been discussed in detail with the patient.  Admission, and post admission procedures and expectations were discussed in detail.  All questions answered.        This note was created with the assistance of [employer] voice recognition software. There may be transcription errors as a result of using this technology, however minimal. Effort has been made to assure accuracy of transcription, but any obvious errors or omissions should be clarified with the author of the document.        Patient was evaluated and examined by Dr. Pollard. LINDA Montano, helped as  scribe in completion of part of note.

## 2024-01-14 LAB
CMV DNA SPEC QL NAA+PROBE: NEGATIVE
SPECIMEN SOURCE: NORMAL

## 2024-01-16 ENCOUNTER — TELEPHONE (OUTPATIENT)
Dept: MATERNAL FETAL MEDICINE | Facility: CLINIC | Age: 24
End: 2024-01-16
Payer: MEDICAID

## 2024-01-16 LAB
CHROM ANALY RESULT (ISCN): NORMAL
CLINICAL CYTOGENETICIST REVIEW: NORMAL
LAB TEST METHOD: NORMAL
M REASON FOR REFERRAL: NORMAL
MOL DX INTERP BLD/T QL: NORMAL
PROVIDER SIGNING NAME: NORMAL
SPECIMEN SOURCE: NORMAL

## 2024-01-16 NOTE — DISCHARGE SUMMARY
[facility]                       [address] [place] [id number]    PATIENT NAME:       HARJIT VÁSQUEZ  YOB: 2000  CSN:                [id number]   MRN:                [id number]  ADMIT DATE:         01/10/2024 13:12:00  PHYSICIAN:          Dennis Keenan Jr, MD                          DISCHARGE SUMMARY    DATE OF DISCHARGE:  01/11/2024 15:10:00    DIAGNOSIS:  Pregnancy at 29 weeks 5 days, admitted from Dr. Samuel Pollard' office   secondary to elevated blood pressures and a hypoplastic left heart.  The patient   was evaluated for blood pressure.  She had normalization of her blood   pressures.  All her labs were normal.  She underwent amniocentesis by Dr. Samuel Pollard on 11th without difficulty.  She was discharged home that p.m.    CONDITION:  Stable.    DIET:  Regular.    ACTIVITY:  Pelvic rest.    MEDICATIONS:  Vitamins.    FOLLOWUP:  With Dr. Pollard and Dr. Keenan as scheduled.        ______________________________  Dennis Keenan Jr, MD    DJE/AQS  DD:  01/16/2024  Time:  10:35AM  DT:  01/16/2024  Time:  11:45AM  Job #:  [id number]      DISCHARGE SUMMARY

## 2024-01-16 NOTE — TELEPHONE ENCOUNTER
----- Message from Anna Asif PA-C sent at 1/16/2024  2:14 PM CST -----  Please notify patient of below:    1. Chromosome analysis on amniotic fluid (microarray) was normal. No abnormalities were detected     Called Pt. No answer, Left V/M.

## 2024-01-16 NOTE — PROGRESS NOTES
Please notify patient of below:    1. Chromosome analysis on amniotic fluid (microarray) was normal. No abnormalities were detected

## 2024-01-17 ENCOUNTER — TELEPHONE (OUTPATIENT)
Dept: MATERNAL FETAL MEDICINE | Facility: CLINIC | Age: 24
End: 2024-01-17
Payer: MEDICAID

## 2024-01-17 DIAGNOSIS — O28.3 ABNORMAL PRENATAL ULTRASOUND: ICD-10-CM

## 2024-01-17 DIAGNOSIS — O36.5930 POOR FETAL GROWTH AFFECTING MANAGEMENT OF MOTHER IN THIRD TRIMESTER, SINGLE OR UNSPECIFIED FETUS: Primary | ICD-10-CM

## 2024-01-17 DIAGNOSIS — O35.BXX0: ICD-10-CM

## 2024-01-17 LAB
ACHE AMN QL: NORMAL
GA: NORMAL WK

## 2024-01-17 NOTE — TELEPHONE ENCOUNTER
----- Message from Anna Asif PA-C sent at 1/16/2024  2:14 PM CST -----  Please notify patient of below:    1. Chromosome analysis on amniotic fluid (microarray) was normal. No abnormalities were detected      Pt returned call regarding her test results that I called her about yesterday. I let Pt know the results. All Pt's questions were answered and Pt verbalized understanding.

## 2024-01-18 ENCOUNTER — APPOINTMENT (OUTPATIENT)
Dept: LAB | Facility: HOSPITAL | Age: 24
End: 2024-01-18
Attending: OBSTETRICS & GYNECOLOGY
Payer: MEDICAID

## 2024-01-18 ENCOUNTER — PROCEDURE VISIT (OUTPATIENT)
Dept: MATERNAL FETAL MEDICINE | Facility: CLINIC | Age: 24
End: 2024-01-18
Payer: MEDICAID

## 2024-01-18 ENCOUNTER — OFFICE VISIT (OUTPATIENT)
Dept: MATERNAL FETAL MEDICINE | Facility: CLINIC | Age: 24
End: 2024-01-18
Payer: MEDICAID

## 2024-01-18 VITALS
DIASTOLIC BLOOD PRESSURE: 76 MMHG | BODY MASS INDEX: 24.1 KG/M2 | WEIGHT: 144.63 LBS | HEART RATE: 133 BPM | SYSTOLIC BLOOD PRESSURE: 130 MMHG | HEIGHT: 65 IN

## 2024-01-18 DIAGNOSIS — O13.3 GESTATIONAL HYPERTENSION WITHOUT SIGNIFICANT PROTEINURIA IN THIRD TRIMESTER: Primary | ICD-10-CM

## 2024-01-18 DIAGNOSIS — O36.5930 POOR FETAL GROWTH AFFECTING MANAGEMENT OF MOTHER IN THIRD TRIMESTER, SINGLE OR UNSPECIFIED FETUS: ICD-10-CM

## 2024-01-18 DIAGNOSIS — O28.3 ABNORMAL PRENATAL ULTRASOUND: ICD-10-CM

## 2024-01-18 DIAGNOSIS — O35.BXX0 HYPOPLASTIC LEFT HEART OF FETUS AFFECTING MANAGEMENT OF MOTHER IN SINGLETON PREGNANCY, ANTEPARTUM: ICD-10-CM

## 2024-01-18 DIAGNOSIS — Z36.9 ENCOUNTER FOR FETAL ULTRASOUND: Primary | ICD-10-CM

## 2024-01-18 DIAGNOSIS — O16.3 ELEVATED BLOOD PRESSURE AFFECTING PREGNANCY IN THIRD TRIMESTER, ANTEPARTUM: ICD-10-CM

## 2024-01-18 DIAGNOSIS — O36.5931 INTRAUTERINE GROWTH RESTRICTION AFFECTING CARE OF MOTHER, ANTEPARTUM, THIRD TRIMESTER, FETUS 1: ICD-10-CM

## 2024-01-18 DIAGNOSIS — O35.BXX0: ICD-10-CM

## 2024-01-18 LAB
ALT SERPL-CCNC: 11 UNIT/L (ref 0–55)
AST SERPL-CCNC: 15 UNIT/L (ref 5–34)
CREAT SERPL-MCNC: 0.7 MG/DL (ref 0.55–1.02)
ERYTHROCYTE [DISTWIDTH] IN BLOOD BY AUTOMATED COUNT: 12.6 % (ref 11.5–17)
GFR SERPLBLD CREATININE-BSD FMLA CKD-EPI: >60 MLS/MIN/1.73/M2
HCT VFR BLD AUTO: 37.6 % (ref 37–47)
HGB BLD-MCNC: 11.7 G/DL (ref 12–16)
LDH SERPL-CCNC: 223 U/L (ref 125–220)
MCH RBC QN AUTO: 29.2 PG (ref 27–31)
MCHC RBC AUTO-ENTMCNC: 31.1 G/DL (ref 33–36)
MCV RBC AUTO: 93.8 FL (ref 80–94)
NRBC BLD AUTO-RTO: 0 %
PLATELET # BLD AUTO: 325 X10(3)/MCL (ref 130–400)
PMV BLD AUTO: 10.8 FL (ref 7.4–10.4)
RBC # BLD AUTO: 4.01 X10(6)/MCL (ref 4.2–5.4)
URATE SERPL-MCNC: 3.2 MG/DL (ref 2.6–6)
WBC # SPEC AUTO: 14.12 X10(3)/MCL (ref 4.5–11.5)

## 2024-01-18 PROCEDURE — 36415 COLL VENOUS BLD VENIPUNCTURE: CPT | Performed by: OBSTETRICS & GYNECOLOGY

## 2024-01-18 PROCEDURE — 3075F SYST BP GE 130 - 139MM HG: CPT | Mod: CPTII,S$GLB,, | Performed by: OBSTETRICS & GYNECOLOGY

## 2024-01-18 PROCEDURE — 1159F MED LIST DOCD IN RCRD: CPT | Mod: CPTII,S$GLB,, | Performed by: OBSTETRICS & GYNECOLOGY

## 2024-01-18 PROCEDURE — 99214 OFFICE O/P EST MOD 30 MIN: CPT | Mod: TH,S$GLB,, | Performed by: OBSTETRICS & GYNECOLOGY

## 2024-01-18 PROCEDURE — 3008F BODY MASS INDEX DOCD: CPT | Mod: CPTII,S$GLB,, | Performed by: OBSTETRICS & GYNECOLOGY

## 2024-01-18 PROCEDURE — 76819 FETAL BIOPHYS PROFIL W/O NST: CPT | Mod: S$GLB,,, | Performed by: OBSTETRICS & GYNECOLOGY

## 2024-01-18 PROCEDURE — 76820 UMBILICAL ARTERY ECHO: CPT | Mod: S$GLB,,, | Performed by: OBSTETRICS & GYNECOLOGY

## 2024-01-18 PROCEDURE — 3078F DIAST BP <80 MM HG: CPT | Mod: CPTII,S$GLB,, | Performed by: OBSTETRICS & GYNECOLOGY

## 2024-01-18 NOTE — PROGRESS NOTES
Maternal Fetal Medicine Follow Up    Subjective     Patient ID: [id number]    Chief Complaint: [facility] followup w/us      HPI: Izzy Palacio is a 23 y.o. female  at 30w5d gestation with Estimated Date of Delivery: 3/23/24  who is here for follow  up consultation by [facility].    She had abnormal four-chamber heart views on outside ultrasound and was suspected to have hypoplastic left heart on consult ultrasound, for which a fetal echocardiogram was ordered.  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  She was also noted to have fetal growth restriction and elevated blood pressure reading x1 on 1/10/2024 with elevated S/D ratio on umbilical artery Doppler and was sent to the hospital for monitoring.  She elected to do amniocentesis which was done on 2024 that showed normal microarray and negative acetylcholine esterase. Preeclampsia labs on 1/10/2024 showed platelets 290, creatinine 0.77, uric acid 3.5, AST 18, ALT 11, .  24 hour urine showed protein level unable to calculate.  All blood pressures were normal in the hospital.  Fetal testing was reassuring.  She was discharged in stable condition. She had negative quad screen with DSR 1:1100.  She has history of a ruptured ectopic pregnancy in 2023 requiring intensive care.  On consult visit she was noted to have elevated heart rate.  She reported she was nervous for the visit.  Heart rate improved before she left the office as well as in the hospital.        Interval history since last [facility] visit: None.. She denies any leaking fluid, vaginal bleeding, contractions, decreased fetal movement. Denies headaches, visual disturbances, or epigastric pain.    Pregnancy complications include:   Patient Active Problem List   Diagnosis    Poor fetal growth affecting management of mother in third trimester     "Elevated S/D ratio on umbilical artery Doppler on 1/10/2024    Hypoplastic left heart of fetus affecting management of mother in corbett pregnancy, antepartum    Elevated blood pressure affecting pregnancy in third trimester, antepartum    Intrauterine growth restriction affecting care of mother, antepartum, third trimester, fetus 1    Gestational hypertension without significant proteinuria in third trimester        No changes to medical, surgical, family, social, or obstetric history.    Medications:  Current Outpatient Medications   Medication Instructions    oxyCODONE (ROXICODONE) 5 mg, Oral, Every 4 hours PRN    PNV no.153/FA/om3/dha/epa/fish (PRENATAL GUMMIES ORAL) Oral    prenatal vit 55-iron-folic-om3 29-1-430 mg CPKD Oral       Review of Systems   12 point review of systems conducted, negative except as stated in the history of present illness. See HPI for details.      Objective     Visit Vitals  /76 (BP Location: Left arm, Patient Position: Sitting, BP Method: Large (Manual))   Pulse (!) 133   Ht 5' 5" (1.651 m)   Wt 65.6 kg (144 lb 9.6 oz)   LMP 2023 (Approximate)   BMI 24.06 kg/m²        Physical Exam  Vitals and nursing note reviewed.   Constitutional:       Appearance: Normal appearance.   HENT:      Head: Normocephalic and atraumatic.      Nose: Nose normal. No congestion.   Cardiovascular:      Rate and Rhythm: Normal rate.   Pulmonary:      Effort: Pulmonary effort is normal.   Skin:     Findings: No rash.   Neurological:      Mental Status: She is alert and oriented to person, place, and time.   Psychiatric:         Mood and Affect: Mood normal.         Behavior: Behavior normal.         Thought Content: Thought content normal.         Judgment: Judgment normal.         ASSESSMENT/PLAN:     23 y.o.  female with IUP at 30w5d    Fetal growth restriction with abnormal umbilical artery Doppler  There is early onset fetal growth restriction with an EFW of 939 g at the 9% and the AC " at the 2%. on 1/10/2024.  AFV is normal. BPP is 8/8.  Abnormal umbilical artery Doppler with elevated S/D ratio at 4.6.    She had amniocentesis that showed normal microarray and acetylcholine esterase. She already did quad screen that was negative. She was advised of need for  evaluation.    Potential etiologies of FGR include but are not limited to normal variation of stature, placental insufficiency, chromosomal abnormalities, genetic disorders, infections, medical conditions, teratogen exposure and other etiologies.      Complications of growth-restricted neonates include hypoglycemia, hyperbilirubinemia, hypothermia, seizures, sepsis, IVH, RDS, necrotizing enterocolitis, and  asphyxia. Long-term complications include cognitive delay and adult diseases such as cardiovascular disorders and type 2 diabetes. There is an increased risk (~20%) for recurrence of fetal growth restriction in a future pregnancy.    Because of increased risk of stillbirth, will monitor interval fetal growth every 3 weeks and do twice weekly fetal testing, including an NST at least once per week. She was previously instructed that fetal testing is not a 100% protective against the risk of fetal demise in-utero. Reviewed the importance of doing fetal kick counts three times a day and resting in a lateral recumbent position and reporting immediately at any time there is any decreased fetal movement even if the same day of testing was emphasized.     Delivery is not indicated at this time, as risks of  mortality and morbidity with delivery, outweigh risks with continued pregnancy. Likewise, with stable condition, or too early a gestational age, steroids (and magnesium sulfate) are not recommended, as benefit is reaped if suspected imminent delivery in few days which, although possible, is very unlikely at this time. Plan delivery 38-39 weeks if continued fetal growth and reassuring fetal testing. Earlier delivery may  be needed if worsening fetal growth, abnormal doppler, or abnormal fetal testing or other concerns.       Gestational hypertension  BP Readings from Last 1 Encounters:   01/18/24 130/76   Initial reading was 157/83   She is asymptomatic.     Discussed risks with hypertension in pregnancy, including FGR, abruption and preeclampsia/eclampsia. Advised of low sodium diet avoiding any excessive weight gain. Ordered 24 hour urine, CBC, uric acid, LDH and LFT.     She is asymptomatic.    Discussed with the patient need for twice weekly follow-up with at least weekly labs, with more frequent labs if worsening blood pressure or clinical condition.  Patient has a blood pressure machine at home and will be checking BP 2-3 times a day.  She was given instructions to come in immediately if she has decreased fetal movements, headaches, vision problems, or epigastric pain.  She is also to come in if blood pressure is over 160 systolic or 105 diastolic.      On 01/10/2024, preeclampsia labs were reassuring. 24 hour urine showed to low protein to calculate We will continue doing weekly preeclampsia labs and twice weekly fetal testing until delivery.  She already got a course of steroids on 1/10 and 01/11/2024.    With gestational hypertension, and fetal growth restriction delivery is recommended at 34-37 weeks as risks of prematurity are outweighed by risks of continued pregnancy.  Specific timing of delivery will depend on ongoing assessments.  Earlier delivery maybe needed for worsening hypertension or severe preeclampsia. Preeclampsia precautions given.    .         Fetal hypoplastic left heart syndrome  Hypoplastic left heart syndrome (HLHS) is characterized by underdevelopment of the left-sided heart structures with significant hypoplasia of the left ventricle (LV), stenosis or atresia of the mitral and/or aortic valves, and hypoplasia of the ascending aorta and aortic arch.  The prevalence of HLHS is approximately 2 to 3 cases  "per 10,000 live births. Although HLHS accounts for only 2 to 3 percent of all congenital heart disease, it is a major congenital cardiac cause of  mortality.     The causes of HLHS are unknown and are likely multifactorial. One proposed mechanism is that abnormal fetal development of left-sided heart structures (ie, the mitral and/or aortic valve) result in altered flow and consequent hypoplasia (sometimes called the "no flow, no grow" theory). Genetic factors also play a carpenter role. The result of the anatomic defects in HLHS is that the right ventricle (RV) supports both the pulmonary and systemic circulation. Survival is dependent on a patent ductus arteriosus (PDA) for systemic perfusion and an unrestrictive atrial septal defect (ASD) for sufficient mixing of oxygenated and deoxygenated blood.     Physical findings that are commonly observed in the natural course of HLHS include cyanosis, respiratory distress, cool extremities, and decreased peripheral pulses. Typically, no murmur is heard during cardiac auscultation. The diagnosis of HLHS is made by echocardiography.  diagnosis is possible during a routine obstetrical ultrasound screening in the second and third trimesters of pregnancy. Patients with suspected HLHS should have a fetal echocardiogram for detailed evaluation of cardiac anatomy and function.    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     There is an increased risk of genetic abnormalities, particularly aneuploidy or 22q11 deletion, in fetuses with CHD; the frequency depends on the specific lesion. She had amniocentesis that showed normal microarray and acetylcholine esterase. She is aware of the need for  evaluation.    Delivery should be planned at a facility with the appropriate level of care for the mother and . Neonates with " ductal-dependent lesions and most with critical cardiac lesions should be delivered at a facility with a level III  intensive care unit (NICU) and pediatric cardiology expertise.  Early delivery and  delivery are generally performed for standard obstetric indications.      She understands that delivery at a tertiary facility like in [place] with pediatric Cardiothoracic surgery we would be beneficial.    Referral was done for patient to go to the [facility] division in [place] for plan delivery the close to the pediatric Cardiothoracic surgery availability.      Tachycardia  Pulse today 133.  Patient's blood pressure was better of the hospital last week.  She might be anxious a little bit.  Her hemoglobin hematocrit is 12 and 38.2 on 01/10/2024.  A TSH was normal earlier in pregnancy on 2023 at 0.829 with no suggestion of hyperthyroidism.  Patient reported she was nervous for the consult visit. She is asymptomatic.    Follow up in about 1 week (around 2024) for MFM follow-up, BPP, UAD, BPP and umbilical artery Doppler.     Future Appointments   Date Time Provider Department Center   2024 11:30 AM [facility]   2024 11:30 AM ROOM 1 AND 2, [facility]   2024  9:00 AM Samuel Pollard MD [facility]   2024  9:00 AM ROOM 3, [facility]   2024  8:30 AM [facility] [place]   2024  8:30 AM ROOM 3, [facility]   2024  8:10 AM [facility]        ELVIRA involvement: Patient was evaluated and examined by Dr. Pollard. LINDA Montano, helped as  scribe in completion of part of note.    This note was created with the assistance of [employer] voice recognition software. There may be transcription errors as a result of using this technology, however minimal. Effort has been made to ensure accuracy of transcription, but any obvious  errors or omissions should be clarified with the author of the document.

## 2024-01-25 ENCOUNTER — SURGICAL CONSULT (OUTPATIENT)
Dept: VASCULAR SURGERY | Facility: CLINIC | Age: 24
End: 2024-01-25
Payer: MEDICAID

## 2024-01-25 ENCOUNTER — CLINICAL SUPPORT (OUTPATIENT)
Dept: PEDIATRIC CARDIOLOGY | Facility: CLINIC | Age: 24
End: 2024-01-25
Payer: MEDICAID

## 2024-01-25 ENCOUNTER — OFFICE VISIT (OUTPATIENT)
Dept: PEDIATRIC CARDIOLOGY | Facility: CLINIC | Age: 24
End: 2024-01-25
Payer: MEDICAID

## 2024-01-25 ENCOUNTER — PROCEDURE VISIT (OUTPATIENT)
Dept: MATERNAL FETAL MEDICINE | Facility: CLINIC | Age: 24
End: 2024-01-25
Payer: MEDICAID

## 2024-01-25 ENCOUNTER — OFFICE VISIT (OUTPATIENT)
Dept: MATERNAL FETAL MEDICINE | Facility: CLINIC | Age: 24
End: 2024-01-25
Payer: MEDICAID

## 2024-01-25 VITALS
SYSTOLIC BLOOD PRESSURE: 122 MMHG | WEIGHT: 147.5 LBS | HEART RATE: 95 BPM | BODY MASS INDEX: 24.57 KG/M2 | HEIGHT: 65 IN | DIASTOLIC BLOOD PRESSURE: 78 MMHG

## 2024-01-25 VITALS
DIASTOLIC BLOOD PRESSURE: 78 MMHG | WEIGHT: 147.5 LBS | HEIGHT: 65 IN | BODY MASS INDEX: 24.57 KG/M2 | SYSTOLIC BLOOD PRESSURE: 122 MMHG | OXYGEN SATURATION: 95 % | HEART RATE: 95 BPM

## 2024-01-25 VITALS — BODY MASS INDEX: 24.54 KG/M2 | DIASTOLIC BLOOD PRESSURE: 78 MMHG | SYSTOLIC BLOOD PRESSURE: 122 MMHG | WEIGHT: 147.5 LBS

## 2024-01-25 DIAGNOSIS — O35.BXX0 HYPOPLASTIC LEFT HEART OF FETUS AFFECTING MANAGEMENT OF MOTHER IN SINGLETON PREGNANCY, ANTEPARTUM: ICD-10-CM

## 2024-01-25 DIAGNOSIS — Z36.9 ENCOUNTER FOR FETAL ULTRASOUND: ICD-10-CM

## 2024-01-25 DIAGNOSIS — O36.5930 POOR FETAL GROWTH AFFECTING MANAGEMENT OF MOTHER IN THIRD TRIMESTER, SINGLE OR UNSPECIFIED FETUS: ICD-10-CM

## 2024-01-25 DIAGNOSIS — O35.BXX0 HYPOPLASTIC LEFT HEART OF FETUS AFFECTING MANAGEMENT OF MOTHER IN SINGLETON PREGNANCY, ANTEPARTUM: Primary | ICD-10-CM

## 2024-01-25 DIAGNOSIS — O13.3 GESTATIONAL HYPERTENSION WITHOUT SIGNIFICANT PROTEINURIA IN THIRD TRIMESTER: ICD-10-CM

## 2024-01-25 PROCEDURE — 3074F SYST BP LT 130 MM HG: CPT | Mod: CPTII,,, | Performed by: PEDIATRICS

## 2024-01-25 PROCEDURE — 76827 ECHO EXAM OF FETAL HEART: CPT | Mod: 26,S$PBB,, | Performed by: PEDIATRICS

## 2024-01-25 PROCEDURE — 3078F DIAST BP <80 MM HG: CPT | Mod: CPTII,,, | Performed by: OBSTETRICS & GYNECOLOGY

## 2024-01-25 PROCEDURE — 3008F BODY MASS INDEX DOCD: CPT | Mod: CPTII,,, | Performed by: OBSTETRICS & GYNECOLOGY

## 2024-01-25 PROCEDURE — 76825 ECHO EXAM OF FETAL HEART: CPT | Mod: 26,S$PBB,, | Performed by: PEDIATRICS

## 2024-01-25 PROCEDURE — 1159F MED LIST DOCD IN RCRD: CPT | Mod: CPTII,,, | Performed by: PEDIATRICS

## 2024-01-25 PROCEDURE — 76820 UMBILICAL ARTERY ECHO: CPT | Mod: 26,S$PBB,, | Performed by: OBSTETRICS & GYNECOLOGY

## 2024-01-25 PROCEDURE — 99999 PR PBB SHADOW E&M-EST. PATIENT-LVL III: CPT | Mod: PBBFAC,,, | Performed by: PEDIATRICS

## 2024-01-25 PROCEDURE — 99213 OFFICE O/P EST LOW 20 MIN: CPT | Mod: PBBFAC,25,27 | Performed by: PEDIATRICS

## 2024-01-25 PROCEDURE — 99205 OFFICE O/P NEW HI 60 MIN: CPT | Mod: 25,S$PBB,, | Performed by: PEDIATRICS

## 2024-01-25 PROCEDURE — 1160F RVW MEDS BY RX/DR IN RCRD: CPT | Mod: CPTII,,, | Performed by: PEDIATRICS

## 2024-01-25 PROCEDURE — 99213 OFFICE O/P EST LOW 20 MIN: CPT | Mod: PBBFAC,TH,25 | Performed by: OBSTETRICS & GYNECOLOGY

## 2024-01-25 PROCEDURE — 93325 DOPPLER ECHO COLOR FLOW MAPG: CPT | Mod: PBBFAC | Performed by: PEDIATRICS

## 2024-01-25 PROCEDURE — 3008F BODY MASS INDEX DOCD: CPT | Mod: CPTII,,, | Performed by: PEDIATRICS

## 2024-01-25 PROCEDURE — 76820 UMBILICAL ARTERY ECHO: CPT | Mod: PBBFAC | Performed by: OBSTETRICS & GYNECOLOGY

## 2024-01-25 PROCEDURE — 3074F SYST BP LT 130 MM HG: CPT | Mod: CPTII,,, | Performed by: OBSTETRICS & GYNECOLOGY

## 2024-01-25 PROCEDURE — 76827 ECHO EXAM OF FETAL HEART: CPT | Mod: PBBFAC | Performed by: PEDIATRICS

## 2024-01-25 PROCEDURE — 76819 FETAL BIOPHYS PROFIL W/O NST: CPT | Mod: PBBFAC | Performed by: OBSTETRICS & GYNECOLOGY

## 2024-01-25 PROCEDURE — 76816 OB US FOLLOW-UP PER FETUS: CPT | Mod: 26,S$PBB,, | Performed by: OBSTETRICS & GYNECOLOGY

## 2024-01-25 PROCEDURE — 76816 OB US FOLLOW-UP PER FETUS: CPT | Mod: PBBFAC | Performed by: OBSTETRICS & GYNECOLOGY

## 2024-01-25 PROCEDURE — 99999 PR PBB SHADOW E&M-EST. PATIENT-LVL III: CPT | Mod: PBBFAC,,, | Performed by: OBSTETRICS & GYNECOLOGY

## 2024-01-25 PROCEDURE — 99205 OFFICE O/P NEW HI 60 MIN: CPT | Mod: 25,S$PBB,TH, | Performed by: OBSTETRICS & GYNECOLOGY

## 2024-01-25 PROCEDURE — 93325 DOPPLER ECHO COLOR FLOW MAPG: CPT | Mod: 26,S$PBB,, | Performed by: PEDIATRICS

## 2024-01-25 PROCEDURE — 76825 ECHO EXAM OF FETAL HEART: CPT | Mod: PBBFAC | Performed by: PEDIATRICS

## 2024-01-25 PROCEDURE — 3078F DIAST BP <80 MM HG: CPT | Mod: CPTII,,, | Performed by: PEDIATRICS

## 2024-01-25 PROCEDURE — 76819 FETAL BIOPHYS PROFIL W/O NST: CPT | Mod: 26,S$PBB,, | Performed by: OBSTETRICS & GYNECOLOGY

## 2024-01-25 PROCEDURE — 99203 OFFICE O/P NEW LOW 30 MIN: CPT | Mod: S$PBB,,, | Performed by: THORACIC SURGERY (CARDIOTHORACIC VASCULAR SURGERY)

## 2024-01-25 NOTE — PROGRESS NOTES
MATERNAL-FETAL MEDICINE   CONSULT NOTE    Provider requesting consultation: Dennis Keenan MD    SUBJECTIVE:     Ms. Izzy Palacio is a 23 y.o.  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 Denies pre-e ROS. Good FM.        Medication List with Changes/Refills   Current Medications    OXYCODONE (ROXICODONE) 5 MG IMMEDIATE RELEASE TABLET    Take 1 tablet (5 mg total) by mouth every 4 (four) hours as needed for Pain.    PNV NO.153/FA/OM3/DHA/EPA/FISH (PRENATAL GUMMIES ORAL)    Take by mouth.    PRENATAL VIT 55-IRON-FOLIC-OM3 29-1-430 MG CPKD    Take by mouth.       Review of patient's allergies indicates:  No Known Allergies    PMH:  Past Medical History:   Diagnosis Date    Anemia     NOT SURE IF CURRENT OR NOT       PObHx:  OB History    Para Term  AB Living   2 0     1     SAB IAB Ectopic Multiple Live Births       1          # Outcome Date GA Lbr Juan Antonio/2nd Weight Sex Delivery Anes PTL Lv   2 Current            1 Ectopic 2023     ECTOPIC          PSH:  Past Surgical History:   Procedure Laterality Date    LAPAROTOMY, EXPLORATORY N/A 2023    Procedure: Ex Laparatomy, Right Salpingectomy, Evacuation of Hemoperitoneum;  Surgeon: Shayy Barksdale MD;  Location: [facility];  Service: OB/GYN;  Laterality: N/A;    SALPINGECTOMY N/A 2023    Procedure: SALPINGECTOMY;  Surgeon: Shayy Barksdale MD;  Location: [facility];  Service: OB/GYN;  Laterality: N/A;       Family history:family history includes Diabetes in her maternal grandfather and paternal grandmother; Hypertension in her maternal grandmother; Stroke in her maternal grandmother.    Social history: reports that she has never smoked. She has never been exposed to tobacco smoke. She has never used smokeless tobacco. She reports that she does not  currently use alcohol. She reports that she does not use drugs.    Genetic history:  The patient denies any inherited genetic diseases or birth defects in herself or her partner's personal history or family.    Objective:   /78 (BP Location: Left arm, Patient Position: Sitting, BP Method: Medium (Manual))   Wt 66.9 kg (147 lb 7.8 oz)   LMP 2023 (Approximate)   BMI 24.54 kg/m²         Ultrasound performed. See viewpoint for full ultrasound report.  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 Pollard, our ultrasound findings support this diagnosis. Pediatric cardiology evaluation to follow   Unable to visualized left kidney, empty left renal fossa, no evidence of ectopic or pelvic kidney. Right kidney is visualized, appears mildly hypoplastic. Normal bladder and amniotic fluid volume  Remainder of limited anatomic review normal appearing    Known FGR based on prior scan with DR. Pollard:  The BPP score is reassuring at 8/8, and the MVP is normal.     Umbilical artery doppler end diastolic flow is elevated with S/D ratio 97%       ASSESSMENT/PLAN:     23 y.o.  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 Pollard for full counseling regarding HLHS    Patient referred to [facility], Pediatric Cardiology, and Pediatric Cardiothoracic Surgery for consultation to coordinate delivery timing and post  management due to CHD. Of note, the patient's pregnancy is also complicated by FGR with elevated UA doppler S/D ratio and gestational hypertension.     Last growth ultrasound performed 1/10/2024 with  g 9% and AC 2%.     Amniocentesis performed revealed normal CMA    Ultrasound today: UA S/D ratio remains elevated. Findings consistent with suspected diagnosis of HLHS with mitral and  aortic atresia. New finding of unilateral renal agenesis (left). No other abnormalities are visualized on limited anatomy review. Counseled on finding of unilateral renal agenesis. The typical compensatory increase in size of the other kidney in cases of unilateral renal agenesis is not seen. At this time, despite mild hypoplasia of the  visualized (right ) kidney, adequate renal function is suspected based on normal AF and bladder. Counseled on possibility of an ectopic/pelvic left kidney; however, no evidence of this was seen on today's ultrasound. Discussed limitations of ultrasound and chromosomal microarray in diagnosing all coexisting anomalies or genetic conditions. Pediatric cardiology notified of this finding, to  patient on the potential changes to overall prognosis with this finding.      Recommendations  Ultrasound today: elevated UA dopplers. BPP .   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, normal bladder and AF  Counseled on this finding, pediatric cardiology notified to incorporate this new finding into their counseling and prognostication   Continue twice weekly PNT with weekly UA dopplers for FGR  If worsening of UA dopplers to absence or reversal, multidisciplinary approach will be needed to optimize GA at delivery, balancing the competing risks of stillbirth and  mortality  Close monitoring of maternal BP, weekly laboratory testing (Plt, AST, ALT, Cr) to monitor for worsening of hypertension  Development of preeclampsia with severe features will also require similar multidisciplinary approach to determine optimal timing of delivery  Fetal echocardiogram and consultation with pediatric cardiology team scheduled this pm  Optimal timing for delivery to optimize  outcome is beyond 38 weeks, tentatively scheduling IOL on 3/10/24 @ 38w1d      FOLLOW UP:   Virtual visit with [facility] in 4 weeks to finalize delivery planning    60 minutes of  total time spent on the encounter, which includes face to face time and non-face to face time preparing to see the patient (eg, review of tests), obtaining and/or reviewing separately obtained history, documenting clinical information in the electronic or other health record, independently interpreting results (not separately reported) and communicating results to the patient/family/caregiver, or care coordination (not separately reported).    Dave Thomas MD  PGY 7  Maternal Fetal Medicine  [facility]      Electronically Signed by Dave Thomas January 25, 2024

## 2024-01-25 NOTE — ASSESSMENT & PLAN NOTE
See prior note by Dr. Pollard for full counseling regarding HLHS    Patient referred to [facility], Pediatric Cardiology, and Pediatric Cardiothoracic Surgery for consultation to coordinate delivery timing and post steve management due to CHD. Of note, the patient's pregnancy is also complicated by FGR with elevated UA doppler S/D ratio and gestational hypertension.     Last growth ultrasound performed 1/10/2024 with  g 9% and AC 2%.     Amniocentesis performed revealed normal CMA    Ultrasound today: UA S/D ratio remains elevated. Findings consistent with suspected diagnosis of HLHS with mitral and aortic atresia. New finding of unilateral renal agenesis (left). No other abnormalities are visualized on limited anatomy review. Counseled on finding of unilateral renal agenesis. At this time, normal renal function is suspected given normal AF and bladder. However mild hypoplasia of right kidney is not consistent with normal compensatory increase in renal size seen with unilateral agenesis. Counseled on possibility of ectopic/pelvic kidney, however no evidence of this was seen on today's ultrasound. Discussed limitations of ultrasound and chromosomal microarray in diagnosing all coexisting anomalies or genetic conditions. Pediatric cardiology notified of this finding, to  patient on the potential changes to overall prognosis with this finding.      Recommendations  Ultrasound today: elevated UA dopplers. BPP 8/8.   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, normal bladder and AF  Counseled on this finding, pediatric cardiology notified to incorporate this new finding into their counseling and prognostication   Continue twice weekly PNT with weekly UA dopplers for FGR  If worsening of UA dopplers to absence or reversal, multidisciplinary approach will be needed to optimize GA at delivery to reduce the risk of stillbirth while allowing fetal maturity given single ventricle  physiology  Close monitoring of maternal BP, weekly laboratory testing (Plt, AST, ALT, Cr) to monitor for worsening of HDP  Development of preeclampsia with severe features will also require similar multidisciplinary approach to determine optimal timing of delivery  Fetal echocardiogram and consultation with pediatric cardiology team scheduled this pm  Optimal timing for delivery to optimize  outcome is beyond 38 weeks, tentatively scheduling IOL on 3/10/24 @ 38w1d

## 2024-01-26 ENCOUNTER — PATIENT MESSAGE (OUTPATIENT)
Dept: MATERNAL FETAL MEDICINE | Facility: CLINIC | Age: 24
End: 2024-01-26
Payer: MEDICAID

## 2024-01-26 DIAGNOSIS — O35.BXX0 HYPOPLASTIC LEFT HEART OF FETUS AFFECTING MANAGEMENT OF MOTHER IN SINGLETON PREGNANCY, ANTEPARTUM: ICD-10-CM

## 2024-01-26 DIAGNOSIS — O36.5930 POOR FETAL GROWTH AFFECTING MANAGEMENT OF MOTHER IN THIRD TRIMESTER, SINGLE OR UNSPECIFIED FETUS: Primary | ICD-10-CM

## 2024-01-27 NOTE — PROGRESS NOTES
Consult Note  Congenital Cardiothoracic Surgery      SUBJECTIVE:       Chief Complaint/Reason for Consult: Baby affected by HLHS     History of Present Illness:  Ms. Izzy Palacio is a 23-year-old, young woman who is expecting a baby that was found to have hypoplastic left heart syndrome and she presents for surgical consultation.       She was seen today by Dr. Freeman.    We met and discussed the indications of the diagnosis as well as the expectations for the early period after delivery, the necessary first stage operation, the post-operative recovery, and the subsequent requirement for stage 2 and stage 3 single ventricle palliation.      Review of patient's allergies indicates:  No Known Allergies    Past Medical History:   Diagnosis Date    Anemia 2023    NOT SURE IF CURRENT OR NOT     Past Surgical History:   Procedure Laterality Date    LAPAROTOMY, EXPLORATORY N/A 2/19/2023    Procedure: Ex Laparatomy, Right Salpingectomy, Evacuation of Hemoperitoneum;  Surgeon: Shayy Barksdale MD;  Location: [facility];  Service: OB/GYN;  Laterality: N/A;    SALPINGECTOMY N/A 2/19/2023    Procedure: SALPINGECTOMY;  Surgeon: Shayy Barksdale MD;  Location: [facility];  Service: OB/GYN;  Laterality: N/A;     Family History   Problem Relation Age of Onset    Diabetes Paternal Grandmother     Stroke Maternal Grandmother     Hypertension Maternal Grandmother     Diabetes Maternal Grandfather      Social History     Tobacco Use    Smoking status: Never     Passive exposure: Never    Smokeless tobacco: Never   Substance Use Topics    Alcohol use: Not Currently    Drug use: Never      Current Outpatient Medications on File Prior to Visit   Medication Sig Dispense Refill    oxyCODONE (ROXICODONE) 5 MG immediate release tablet Take 1 tablet (5 mg total) by mouth every 4 (four) hours as needed for Pain. (Patient not taking: Reported on 1/10/2024) 15 tablet 0    PNV no.153/FA/om3/dha/epa/fish (PRENATAL GUMMIES ORAL) Take by mouth.      prenatal  vit 55-iron-folic-om3 29-1-430 mg CPKD Take by mouth.       No current facility-administered medications on file prior to visit.           OBJECTIVE:     Vital Signs (Most Recent)  Pulse: 95 (01/25/24 1558)  BP: 122/78 (01/25/24 1558)  SpO2: 95 % (01/25/24 1558)     Diagnostic Results:  Fetal echo results reviewed with Dr. Freeman.       ASSESSMENT/PLAN:   Ms. Izzy Palacio is a 23-year-old, young woman who is expecting a baby that was found to have hypoplastic left heart syndrome and she presents for surgical consultation.       The echo today demonstrated HLHS(MA/AA) with no concerns currently about restriction at the atrial septum.      Additional findings include suspicion for single kidney.    I reviewed the indications for surgery as well as an overview of the typical immediate management after birth and prior to surgery.  We also discussed the first stage operation(Krystal) including the objectives and risks and typical post-operative recovery expectations and duration.     We also discussed an overview of timing and outcomes for stage 2 and stage 3 palliation.    I answered her questions and explained we would also have time to discuss things in detail again after the baby arrives.  I also offered to discuss things further if needed or desired in the interim.    Based on some information she presented with  from some of her other medical care team, she did have questions about timing of delivery and method.  I said that would ultimately be up to her OBGYN/MFM team what was best regarding the delivery, but I did discuss that it would be ideal for her baby to deliver as close to full term as possible and that the baby's cardiac anatomy in and of itself is not a contraindication for a normal vaginal delivery.       We will look forward to the opportunity to care for Ms. Palacio's new baby when they arrive.       Ventura Javier MD

## 2024-01-29 DIAGNOSIS — O35.BXX0 HYPOPLASTIC LEFT HEART OF FETUS AFFECTING MANAGEMENT OF MOTHER IN SINGLETON PREGNANCY, ANTEPARTUM: Primary | ICD-10-CM

## 2024-01-31 DIAGNOSIS — Z36.9 ENCOUNTER FOR FETAL ULTRASOUND: ICD-10-CM

## 2024-01-31 DIAGNOSIS — O13.3 GESTATIONAL HYPERTENSION WITHOUT SIGNIFICANT PROTEINURIA IN THIRD TRIMESTER: ICD-10-CM

## 2024-01-31 DIAGNOSIS — O36.5930 POOR FETAL GROWTH AFFECTING MANAGEMENT OF MOTHER IN THIRD TRIMESTER, SINGLE OR UNSPECIFIED FETUS: Primary | ICD-10-CM

## 2024-01-31 DIAGNOSIS — O35.BXX0 HYPOPLASTIC LEFT HEART OF FETUS AFFECTING MANAGEMENT OF MOTHER IN SINGLETON PREGNANCY, ANTEPARTUM: ICD-10-CM

## 2024-02-01 ENCOUNTER — PROCEDURE VISIT (OUTPATIENT)
Dept: MATERNAL FETAL MEDICINE | Facility: CLINIC | Age: 24
End: 2024-02-01
Payer: MEDICAID

## 2024-02-01 ENCOUNTER — OFFICE VISIT (OUTPATIENT)
Dept: MATERNAL FETAL MEDICINE | Facility: CLINIC | Age: 24
End: 2024-02-01
Payer: MEDICAID

## 2024-02-01 ENCOUNTER — LAB VISIT (OUTPATIENT)
Dept: LAB | Facility: HOSPITAL | Age: 24
End: 2024-02-01
Attending: OBSTETRICS & GYNECOLOGY
Payer: MEDICAID

## 2024-02-01 VITALS
HEART RATE: 97 BPM | DIASTOLIC BLOOD PRESSURE: 78 MMHG | WEIGHT: 148 LBS | BODY MASS INDEX: 24.66 KG/M2 | HEIGHT: 65 IN | SYSTOLIC BLOOD PRESSURE: 114 MMHG

## 2024-02-01 DIAGNOSIS — O36.5930 POOR FETAL GROWTH AFFECTING MANAGEMENT OF MOTHER IN THIRD TRIMESTER, SINGLE OR UNSPECIFIED FETUS: ICD-10-CM

## 2024-02-01 DIAGNOSIS — Z87.59 HISTORY OF ECTOPIC PREGNANCY: ICD-10-CM

## 2024-02-01 DIAGNOSIS — O13.3 GESTATIONAL HYPERTENSION WITHOUT SIGNIFICANT PROTEINURIA IN THIRD TRIMESTER: ICD-10-CM

## 2024-02-01 DIAGNOSIS — O35.BXX0 HYPOPLASTIC LEFT HEART OF FETUS AFFECTING MANAGEMENT OF MOTHER IN SINGLETON PREGNANCY, ANTEPARTUM: ICD-10-CM

## 2024-02-01 DIAGNOSIS — O28.3 ABNORMAL FETAL ULTRASOUND: ICD-10-CM

## 2024-02-01 DIAGNOSIS — O13.3 GESTATIONAL HYPERTENSION WITHOUT SIGNIFICANT PROTEINURIA IN THIRD TRIMESTER: Primary | ICD-10-CM

## 2024-02-01 DIAGNOSIS — Z36.9 ENCOUNTER FOR FETAL ULTRASOUND: ICD-10-CM

## 2024-02-01 DIAGNOSIS — O36.5931 INTRAUTERINE GROWTH RESTRICTION AFFECTING CARE OF MOTHER, ANTEPARTUM, THIRD TRIMESTER, FETUS 1: ICD-10-CM

## 2024-02-01 DIAGNOSIS — O28.3 ABNORMAL PRENATAL ULTRASOUND: ICD-10-CM

## 2024-02-01 PROBLEM — O35.EXX0 RENAL AGENESIS OF FETUS AFFECTING ANTEPARTUM CARE OF MOTHER: Status: ACTIVE | Noted: 2024-02-01

## 2024-02-01 PROBLEM — O16.3 ELEVATED BLOOD PRESSURE AFFECTING PREGNANCY IN THIRD TRIMESTER, ANTEPARTUM: Status: RESOLVED | Noted: 2024-01-10 | Resolved: 2024-02-01

## 2024-02-01 LAB
ALT SERPL-CCNC: 8 UNIT/L (ref 0–55)
AST SERPL-CCNC: 14 UNIT/L (ref 5–34)
CREAT SERPL-MCNC: 0.75 MG/DL (ref 0.55–1.02)
ERYTHROCYTE [DISTWIDTH] IN BLOOD BY AUTOMATED COUNT: 12.5 % (ref 11.5–17)
GFR SERPLBLD CREATININE-BSD FMLA CKD-EPI: >60 MLS/MIN/1.73/M2
HCT VFR BLD AUTO: 33.7 % (ref 37–47)
HGB BLD-MCNC: 10.9 G/DL (ref 12–16)
LDH SERPL-CCNC: 194 U/L (ref 125–220)
MCH RBC QN AUTO: 29.3 PG (ref 27–31)
MCHC RBC AUTO-ENTMCNC: 32.3 G/DL (ref 33–36)
MCV RBC AUTO: 90.6 FL (ref 80–94)
NRBC BLD AUTO-RTO: 0 %
PLATELET # BLD AUTO: 225 X10(3)/MCL (ref 130–400)
PMV BLD AUTO: 10.7 FL (ref 7.4–10.4)
RBC # BLD AUTO: 3.72 X10(6)/MCL (ref 4.2–5.4)
URATE SERPL-MCNC: 3.7 MG/DL (ref 2.6–6)
WBC # SPEC AUTO: 8.08 X10(3)/MCL (ref 4.5–11.5)

## 2024-02-01 PROCEDURE — 76816 OB US FOLLOW-UP PER FETUS: CPT | Mod: S$GLB,,, | Performed by: OBSTETRICS & GYNECOLOGY

## 2024-02-01 PROCEDURE — 84460 ALANINE AMINO (ALT) (SGPT): CPT

## 2024-02-01 PROCEDURE — 3074F SYST BP LT 130 MM HG: CPT | Mod: CPTII,S$GLB,, | Performed by: OBSTETRICS & GYNECOLOGY

## 2024-02-01 PROCEDURE — 84550 ASSAY OF BLOOD/URIC ACID: CPT

## 2024-02-01 PROCEDURE — 3078F DIAST BP <80 MM HG: CPT | Mod: CPTII,S$GLB,, | Performed by: OBSTETRICS & GYNECOLOGY

## 2024-02-01 PROCEDURE — 99214 OFFICE O/P EST MOD 30 MIN: CPT | Mod: TH,S$GLB,, | Performed by: OBSTETRICS & GYNECOLOGY

## 2024-02-01 PROCEDURE — 3008F BODY MASS INDEX DOCD: CPT | Mod: CPTII,S$GLB,, | Performed by: OBSTETRICS & GYNECOLOGY

## 2024-02-01 PROCEDURE — 76819 FETAL BIOPHYS PROFIL W/O NST: CPT | Mod: S$GLB,,, | Performed by: OBSTETRICS & GYNECOLOGY

## 2024-02-01 PROCEDURE — 82565 ASSAY OF CREATININE: CPT

## 2024-02-01 PROCEDURE — 76820 UMBILICAL ARTERY ECHO: CPT | Mod: S$GLB,,, | Performed by: OBSTETRICS & GYNECOLOGY

## 2024-02-01 PROCEDURE — 84450 TRANSFERASE (AST) (SGOT): CPT

## 2024-02-01 PROCEDURE — 83615 LACTATE (LD) (LDH) ENZYME: CPT

## 2024-02-01 PROCEDURE — 85027 COMPLETE CBC AUTOMATED: CPT

## 2024-02-01 PROCEDURE — 1159F MED LIST DOCD IN RCRD: CPT | Mod: CPTII,S$GLB,, | Performed by: OBSTETRICS & GYNECOLOGY

## 2024-02-01 PROCEDURE — 36415 COLL VENOUS BLD VENIPUNCTURE: CPT

## 2024-02-01 NOTE — PROGRESS NOTES
Maternal Fetal Medicine Follow Up    Subjective     Patient ID: [id number]    Chief Complaint: [facility] follow up w/us       HPI: Izzy Palacio is a 23 y.o. female  at 32w5d gestation with Estimated Date of Delivery: 3/23/24  who is here for follow  up consultation by [facility].    She had abnormal four-chamber heart views on outside ultrasound and was suspected to have hypoplastic left heart on consult ultrasound, for which a fetal echocardiogram was ordered.  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  She was also noted to have fetal growth restriction and diagnosed with gestational hypertension on 1/10/2024 with elevated S/D ratio on umbilical artery Doppler and was sent to the hospital for monitoring.  She elected to do amniocentesis which was done on 2024 that showed normal microarray and negative acetylcholine esterase. Preeclampsia labs on 2024 showed , platelets 325, uric acid 3.2, creatinine 0.7, AST 15, ALT 11.  24 hour urine on 1/10/24 showed protein level unable to calculate. She had negative quad screen with DSR 1:1100.  She was seen at [facility] for consultation with planned delivery in [place] on 2024.  S/D ratio was still elevated on umbilical artery Doppler. 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  She also had a congenital Cardiothoracic surgery consult.   She has history of a ruptured ectopic pregnancy in 2023 requiring intensive care.      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        Interval history since last [facility] visit: None.. She denies any leaking fluid, vaginal bleeding, contractions, decreased fetal movement. Denies headaches,  "visual disturbances, or epigastric pain.    Pregnancy complications include:   Patient Active Problem List   Diagnosis    Poor fetal growth affecting management of mother in third trimester    Elevated S/D ratio on umbilical artery Doppler on 1/10/2024    Hypoplastic left heart of fetus affecting management of mother in [place] pregnancy, antepartum    Gestational hypertension without significant proteinuria in third trimester    History of ectopic pregnancy    Fetal pelvic kidney, left        No changes to medical, surgical, family, social, or obstetric history.    Medications:  Current Outpatient Medications   Medication Instructions    oxyCODONE (ROXICODONE) 5 mg, Oral, Every 4 hours PRN    PNV no.153/FA/om3/dha/epa/fish (PRENATAL GUMMIES ORAL) Oral    prenatal vit 55-iron-folic-om3 29-1-430 mg CPKD Oral       Review of Systems   12 point review of systems conducted, negative except as stated in the history of present illness. See HPI for details.      Objective     Visit Vitals  /78 (BP Location: Left arm, Patient Position: Sitting, BP Method: Medium (Automatic))   Pulse 97   Ht 5' 5" (1.651 m)   Wt 67.1 kg (148 lb)   LMP 2023 (Approximate)   BMI 24.63 kg/m²        Physical Exam  Vitals and nursing note reviewed.   Constitutional:       Appearance: Normal appearance.   HENT:      Head: Normocephalic and atraumatic.      Nose: Nose normal. No congestion.   Cardiovascular:      Rate and Rhythm: Normal rate.   Pulmonary:      Effort: Pulmonary effort is normal.   Skin:     Findings: No rash.   Neurological:      Mental Status: She is alert and oriented to person, place, and time.   Psychiatric:         Mood and Affect: Mood normal.         Behavior: Behavior normal.         Thought Content: Thought content normal.         Judgment: Judgment normal.         ASSESSMENT/PLAN:     23 y.o.  female with IUP at 32w5d    Fetal growth restriction with abnormal umbilical artery Doppler  There is  " improved  and continued fetal growth with an EFW of 1729 g at the 15% and the AC at the 17% on 2024  AFV is normal. BPP is 8/8.  Abnormal umbilical artery Doppler with borderline elevated S/D ratio.    She had amniocentesis that showed normal microarray and acetylcholine esterase. She already did quad screen that was negative. She was advised of need for  evaluation.    Potential etiologies of FGR include but are not limited to normal variation of stature, placental insufficiency, chromosomal abnormalities, genetic disorders, infections, medical conditions, teratogen exposure and other etiologies.      Complications of growth-restricted neonates include hypoglycemia, hyperbilirubinemia, hypothermia, seizures, sepsis, IVH, RDS, necrotizing enterocolitis, and  asphyxia. Long-term complications include cognitive delay and adult diseases such as cardiovascular disorders and type 2 diabetes. There is an increased risk (~20%) for recurrence of fetal growth restriction in a future pregnancy.    Because of increased risk of stillbirth, will monitor interval fetal growth every 3 weeks and do twice weekly fetal testing, including an NST at least once per week. She was previously instructed that fetal testing is not a 100% protective against the risk of fetal demise in-utero. Reviewed the importance of doing fetal kick counts three times a day and resting in a lateral recumbent position and reporting immediately at any time there is any decreased fetal movement even if the same day of testing was emphasized.     Delivery is not indicated at this time, as risks of  mortality and morbidity with delivery, outweigh risks with continued pregnancy. Likewise, with stable condition, or too early a gestational age, steroids (and magnesium sulfate) are not recommended, as benefit is reaped if suspected imminent delivery in few days which, although possible, is very unlikely at this time. Plan  delivery 38-39 weeks if continued fetal growth and reassuring fetal testing. Earlier delivery may be needed if worsening fetal growth, abnormal doppler, or abnormal fetal testing or other concerns.       Gestational hypertension  BP Readings from Last 1 Encounters:   02/01/24 114/78   Discussed risks with hypertension in pregnancy, including FGR, abruption and preeclampsia/eclampsia. Advised of low sodium diet avoiding any excessive weight gain.     BP Readings from Last 1 Encounters:   02/01/24 114/78     She is asymptomatic.    Reviewed with the patient need for twice weekly follow-up with at least weekly labs, with more frequent labs if worsening blood pressure or clinical condition.  Patient has a blood pressure machine at home and will be checking BP 2-3 times a day.  She was given instructions to come in immediately if she has decreased fetal movements, headaches, vision problems, or epigastric pain.  She is also to come in if blood pressure is over 160 systolic or 105 diastolic.      On 01/18/2024, preeclampsia labs were reassuring. 24 hour urine on 1/10/24 showed to low protein to calculate We will continue doing weekly preeclampsia labs and twice weekly fetal testing until delivery.  She already received a course of steroids on 1/10 and 01/11/2024.    With gestational hypertension, and fetal growth restriction delivery is recommended at 34-37 weeks as risks of prematurity are outweighed by risks of continued pregnancy.  Specific timing of delivery will depend on ongoing assessments.  Earlier delivery maybe needed for worsening hypertension or severe preeclampsia. Preeclampsia precautions given.        Fetal hypoplastic left heart syndrome  Hypoplastic left heart syndrome (HLHS) is characterized by underdevelopment of the left-sided heart structures with significant hypoplasia of the left ventricle (LV), stenosis or atresia of the mitral and/or aortic valves, and hypoplasia of the ascending aorta and aortic  arch.      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     There is an increased risk of genetic abnormalities, particularly aneuploidy or 22q11 deletion, in fetuses with CHD; the frequency depends on the specific lesion. She had amniocentesis that showed normal microarray and acetylcholine esterase. She is aware of the need for  evaluation.    Delivery should be planned at a facility with the appropriate level of care for the mother and . Neonates with ductal-dependent lesions and most with critical cardiac lesions should be delivered at a facility with a level III  intensive care unit (NICU) and pediatric cardiology expertise.  Early delivery and  delivery are generally performed for standard obstetric indications.      She understands that delivery at a tertiary facility like in [place] with pediatric Cardiothoracic surgery we would be beneficial.  She has had the appropriate consultations with [place] team.      Left fetal pelvic kidney  Renal ectopy is a positional abnormality with failure of the kidney(s) to normally ascend to the retroperitoneal renal fossa (level of the second lumbar vertebrae). Simple renal ectopy refers to a kidney that lies on the correct side but in an abnormal position, and crossed renal ectopy refers to a kidney that crosses the midline to the contralateral side.  Ectopic kidneys that do not ascend above the pelvic brim are commonly called pelvic kidneys. The ectopic kidney fails to rotate normally, resulting in a shift of the renal axis so that the renal pelvis is directed anteriorly rather than medially. The blood supply of the ectopic kidney is variable and may come from the iliac arteries, aorta, and, at times, the hypogastric and middle sacral arteries, reflecting the continuous changes in blood supply of  the developing kidneys during renal ascent.      Most patients with an ectopic or fused kidney(s) are asymptomatic and are diagnosed incidentally, often by  ultrasonography. In symptomatic patients with either anomaly, complaints at presentation are generally related to associated complications including urinary tract infection (UTI), urinary obstruction, and kidney stones. Patients with renal ectopy or fused kidneys are at increased risk for other anomalies, especially genitourinary abnormalities such as vesicoureteral reflux (VUR) and complications including UTI, urinary obstruction, and kidney stones.     Although outcome data are limited, case series suggest that individuals with renal ectopic or fusion anomalies are at risk for long-term renal sequalae compared with those without these anomalies. These include decreased renal function and higher rates of urinary tract obstruction, kidney stones, urogenital cancer, and end-stage kidney disease.     Discussed limitations of ultrasound and chromosomal microarray in diagnosing all coexisting anomalies or genetic conditions.  Reviewed need for  evaluation.    Follow up in about 1 week (around 2024) for BPP and umbilical artery Doppler, MFM follow-up.     Future Appointments   Date Time Provider Department Center   2024  8:30 AM Samuel Pollard MD [facility] [place] [facility]   2024  8:30 AM ROOM 1 AND 2, [facility] [place] [facility]   3/5/2024  1:00 PM Benigno Freeman MD [facility]   3/5/2024  1:00 PM [facility] [place]   2024  9:10 AM Niya Thomas FNP [facility] [place]      Preeclampsia labs were ordered today.  Patient has no symptoms.  Patient today shared with us that she has a left pelvic kidney and appeared that the baby had the same thing.  Continue twice weekly fetal testing.  Fetal kick count instructions.  Preeclampsia warnings      ELVIRA involvement: Patient was evaluated and  examined by Dr. Pollard. LINDA Montano, helped as  scribe in completion of part of note.    This note was created with the assistance of [employer] voice recognition software. There may be transcription errors as a result of using this technology, however minimal. Effort has been made to ensure accuracy of transcription, but any obvious errors or omissions should be clarified with the author of the document.      I spent at least 25 minutes on this patient on 02/01/2024 , separate from any other billable services,  including one or more of the following activities: reviewing old records, performing appropriate history and exam, medical decision making, counseling and education, calling in prescriptions, ordering tests, ordering labs, discussing the patient with staff or another provider and creating this documentation.

## 2024-02-05 DIAGNOSIS — O13.3 GESTATIONAL HYPERTENSION WITHOUT SIGNIFICANT PROTEINURIA IN THIRD TRIMESTER: Primary | ICD-10-CM

## 2024-02-07 NOTE — PROGRESS NOTES
Maternal Fetal Medicine Follow Up    Subjective     Patient ID: [id number]    Chief Complaint: M follow up with US (Gestational HTN.   )      HPI: Izzy Palacio is a 23 y.o. female  at 33w5d gestation with Estimated Date of Delivery: 3/23/24  who is here for follow  up consultation by [facility].    She had abnormal four-chamber heart views on outside ultrasound and was suspected to have hypoplastic left heart on consult ultrasound, for which a fetal echocardiogram was ordered.  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  She was also noted to have fetal growth restriction and diagnosed with gestational hypertension on 1/10/2024 with elevated S/D ratio on umbilical artery Doppler.  She had an  amniocentesis on 2024 that showed normal microarray and negative acetylcholine esterase. 24 hour urine on 1/10/24 showed protein level unable to calculate.  Most recent preeclampsia labs on 2024 were reassuring with platelets 225, creatinine 0.75, uric acid 3.7, AST 14, ALT 8, . She was seen at [facility] for consultation with planned delivery in [place] on 2024.  S/D ratio was still elevated on umbilical artery Doppler. The left kidney was not visualized on ultrasound, with no additional abnormalities.  She also had a congenital Cardiothoracic surgery consult.   She has history of a ruptured ectopic pregnancy in 2023 requiring intensive care.  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    On the recommendation of pediatric Cardiothoracic surgery the further we get closer to 39 weeks the better chance of better outcomes in successful surgery.  Because of that plan is to get to delivery close to  "38 weeks.        Interval history since last [facility] visit: None.. She denies any leaking fluid, vaginal bleeding, contractions, decreased fetal movement. Denies headaches, visual disturbances, or epigastric pain.    Pregnancy complications include:   Patient Active Problem List   Diagnosis    Poor fetal growth affecting management of mother in third trimester    Elevated S/D ratio on umbilical artery Doppler on 1/10/2024    Hypoplastic left heart of fetus affecting management of mother in [place] pregnancy, antepartum    Gestational hypertension without significant proteinuria in third trimester    History of ectopic pregnancy    Fetal pelvic kidney, left    Fetal pericardial effusion affecting management of mother        No changes to medical, surgical, family, social, or obstetric history.    Medications:  Current Outpatient Medications   Medication Instructions    oxyCODONE (ROXICODONE) 5 mg, Oral, Every 4 hours PRN    PNV no.153/FA/om3/dha/epa/fish (PRENATAL GUMMIES ORAL) Oral    prenatal vit 55-iron-folic-om3 29-1-430 mg CPKD Oral       Review of Systems   12 point review of systems conducted, negative except as stated in the history of present illness. See HPI for details.      Objective     Visit Vitals  /80 (BP Location: Right arm, Patient Position: Sitting, BP Method: Medium (Automatic))   Pulse 99   Ht 5' 5" (1.651 m)   Wt 67.6 kg (149 lb)   LMP 02/21/2023 (Approximate)   BMI 24.79 kg/m²        Physical Exam  Vitals and nursing note reviewed.   Constitutional:       Appearance: Normal appearance.   HENT:      Head: Normocephalic and atraumatic.      Nose: Nose normal. No congestion.   Cardiovascular:      Rate and Rhythm: Normal rate.   Pulmonary:      Effort: Pulmonary effort is normal.   Skin:     Findings: No rash.   Neurological:      Mental Status: She is alert and oriented to person, place, and time.   Psychiatric:         Mood and Affect: Mood normal.         Behavior: Behavior normal.         " Thought Content: Thought content normal.         Judgment: Judgment normal.         ASSESSMENT/PLAN:     23 y.o.  female with IUP at 33w5d    Fetal growth restriction with abnormal umbilical artery Doppler  There is improved and continued fetal growth with an EFW of 1729 g at the 15% and the AC at the 17% on 2024.  AFV is normal. BPP is 8/8.  Abnormal umbilical artery Doppler with elevated S/D ratio.    She had amniocentesis that showed normal microarray and acetylcholine esterase. She already did quad screen that was negative. She was advised of need for  evaluation.    Potential etiologies of FGR include but are not limited to normal variation of stature, placental insufficiency, chromosomal abnormalities, genetic disorders, infections, medical conditions, teratogen exposure and other etiologies.      Complications of growth-restricted neonates include hypoglycemia, hyperbilirubinemia, hypothermia, seizures, sepsis, IVH, RDS, necrotizing enterocolitis, and  asphyxia. Long-term complications include cognitive delay and adult diseases such as cardiovascular disorders and type 2 diabetes. There is an increased risk (~20%) for recurrence of fetal growth restriction in a future pregnancy.    Because of increased risk of stillbirth, will monitor interval fetal growth every 3 weeks and do twice weekly fetal testing, including an NST at least once per week. She was previously instructed that fetal testing is not a 100% protective against the risk of fetal demise in-utero. Reviewed the importance of doing fetal kick counts three times a day and resting in a lateral recumbent position and reporting immediately at any time there is any decreased fetal movement even if the same day of testing was emphasized.     Delivery is not indicated at this time, as risks of  mortality and morbidity with delivery, outweigh risks with continued pregnancy. Likewise, with stable condition, or too early a  gestational age, steroids (and magnesium sulfate) are not recommended, as benefit is reaped if suspected imminent delivery in few days which, although possible, is very unlikely at this time.  We will rediscuss delivery planning on February 8, 2024.  Even though with the abnormal gestational hypertension, and fetal growth restriction with elevated S/D ratio on umbilical artery Doppler, delivery is recommended at 34-37 weeks as risks of prematurity are outweighed by risks of continued pregnancy, , based on the hypoplastic left heart syndrome recommendations of Cardiothoracic surgery trying to get to 38 weeks rather than 37 weeks would get better outcomes for the baby.  We will plan to continue twice weekly fetal testing and try to get to that is stage in the pregnancy with consideration for hospitalization during that 37th week prior to delivery at 38 weeks.  Obviously earlier delivery may be needed for worsening maternal or fetal condition.    Patient understands the risks and is in agreement with plan of care.      Fetal pericardial effusion  I discussed the concern with finding with her,  including risk for congenital heart disease, cardiac dysfunction such as arrhythmias (both tachycardias and bradycardias), aneuploidy and fetal anemia (that may be caused by alloimmunization or certain viral infections such as CMV or Parvo), although most cases of such isolated finding are usually reflective of benign condition with no significant sequela.     With aneuploidy being in differential, offered to do an amniocentesis to check for aneuploidy, CMV and parvovirus, knowing low yield on testing, benefits and risks discussed ADD B/R, patient had amniocentesis that showed normal microarray and acetylcholine esterase. with low yield, and minimal therapeutic options, altenative of cell free DNA, CMV IgG /IgM, and parvovirus IgG /IgM was offered and already did quad screen that was negative.    With fetal anemia being a  differential for pericardial effusion, a MCA doppler was done as a screening for fetal anemia. Normal MCA doppler with PSV at 1.30 MoM with no evidence of and low suspicion for fetal anemia, no further MCA assessment warranted at this time. There is no evidence of fetal hydrops at this time.  She has known fetal hypoplastic left heart syndrome.  She was advised of need for  evaluation.       Gestational hypertension  BP Readings from Last 1 Encounters:   24 127/80   Discussed risks with hypertension in pregnancy, including FGR, abruption and preeclampsia/eclampsia. Advised of low sodium diet avoiding any excessive weight gain.     She is asymptomatic.    Reviewed with the patient need for twice weekly follow-up with at least weekly labs, with more frequent labs if worsening blood pressure or clinical condition.  Patient has a blood pressure machine at home and will be checking BP 2-3 times a day.  She was given instructions to come in immediately if she has decreased fetal movements, headaches, vision problems, or epigastric pain.  She is also to come in if blood pressure is over 160 systolic or 105 diastolic.      On 2024, preeclampsia labs were reassuring. 24 hour urine on 1/10/24 showed to low protein to calculate. We will continue doing weekly preeclampsia labs and twice weekly fetal testing until delivery.  She already received a course of steroids on 1/10 and 2024.    See delivery recommendations as above.    Fetal hypoplastic left heart syndrome  Hypoplastic left heart syndrome (HLHS) is characterized by underdevelopment of the left-sided heart structures with significant hypoplasia of the left ventricle (LV), stenosis or atresia of the mitral and/or aortic valves, and hypoplasia of the ascending aorta and aortic arch.      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     There is an increased risk of genetic abnormalities, particularly aneuploidy or 22q11 deletion, in fetuses with CHD; the frequency depends on the specific lesion. She had amniocentesis that showed normal microarray and acetylcholine esterase. She is aware of the need for  evaluation.    Delivery should be planned at a facility with the appropriate level of care for the mother and . Neonates with ductal-dependent lesions and most with critical cardiac lesions should be delivered at a facility with a level III  intensive care unit (NICU) and pediatric cardiology expertise.  Early delivery and  delivery are generally performed for standard obstetric indications.      She understands that delivery at a tertiary facility like in [place] with pediatric Cardiothoracic surgery we would be beneficial.  She has had the appropriate consultations with [place] team.      Left fetal pelvic kidney  Although outcome data are limited, case series suggest that individuals with renal ectopic or fusion anomalies are at risk for long-term renal sequalae compared with those without these anomalies. These include decreased renal function and higher rates of urinary tract obstruction, kidney stones, urogenital cancer, and end-stage kidney disease.     Reviewed limitations of ultrasound and chromosomal microarray in diagnosing all coexisting anomalies or genetic conditions.  Reviewed need for  evaluation.    Follow up in about 1 week (around 2/15/2024) for M follow-up, BPP, UAD.     Future Appointments   Date Time Provider Department Center   2/15/2024  9:00 AM Samuel Pollard MD [facility]   2/15/2024  9:00 AM ROOM 1 AND 2, [facility]   3/5/2024  1:00 PM Benigno Freeman MD [facility]   3/5/2024  1:00 PM CV [facility] FETAL ECHO 01 [facility] [place]   2024  9:10 AM Niya Thomas FNP [facility] [place]      ELVIRA  involvement: Patient was evaluated and examined by Dr. Pollard. LINDA Montano, helped as  scribe in completion of part of note.    This note was created with the assistance of [employer] voice recognition software. There may be transcription errors as a result of using this technology, however minimal. Effort has been made to ensure accuracy of transcription, but any obvious errors or omissions should be clarified with the author of the document.

## 2024-02-08 ENCOUNTER — TELEPHONE (OUTPATIENT)
Dept: MATERNAL FETAL MEDICINE | Facility: CLINIC | Age: 24
End: 2024-02-08

## 2024-02-08 ENCOUNTER — PROCEDURE VISIT (OUTPATIENT)
Dept: MATERNAL FETAL MEDICINE | Facility: CLINIC | Age: 24
End: 2024-02-08
Payer: MEDICAID

## 2024-02-08 ENCOUNTER — LAB VISIT (OUTPATIENT)
Dept: LAB | Facility: HOSPITAL | Age: 24
End: 2024-02-08
Payer: MEDICAID

## 2024-02-08 ENCOUNTER — OFFICE VISIT (OUTPATIENT)
Dept: MATERNAL FETAL MEDICINE | Facility: CLINIC | Age: 24
End: 2024-02-08
Payer: MEDICAID

## 2024-02-08 VITALS
BODY MASS INDEX: 24.83 KG/M2 | HEIGHT: 65 IN | HEART RATE: 99 BPM | DIASTOLIC BLOOD PRESSURE: 80 MMHG | SYSTOLIC BLOOD PRESSURE: 127 MMHG | WEIGHT: 149 LBS

## 2024-02-08 DIAGNOSIS — O35.BXX0 HYPOPLASTIC LEFT HEART OF FETUS AFFECTING MANAGEMENT OF MOTHER IN SINGLETON PREGNANCY, ANTEPARTUM: ICD-10-CM

## 2024-02-08 DIAGNOSIS — O36.5930 POOR FETAL GROWTH AFFECTING MANAGEMENT OF MOTHER IN THIRD TRIMESTER, SINGLE OR UNSPECIFIED FETUS: ICD-10-CM

## 2024-02-08 DIAGNOSIS — O36.8990 FETAL PERICARDIAL EFFUSION AFFECTING MANAGEMENT OF MOTHER: ICD-10-CM

## 2024-02-08 DIAGNOSIS — O13.3 GESTATIONAL HYPERTENSION WITHOUT SIGNIFICANT PROTEINURIA IN THIRD TRIMESTER: ICD-10-CM

## 2024-02-08 DIAGNOSIS — Z87.59 HISTORY OF ECTOPIC PREGNANCY: ICD-10-CM

## 2024-02-08 DIAGNOSIS — O28.3 ABNORMAL FETAL ULTRASOUND: ICD-10-CM

## 2024-02-08 DIAGNOSIS — O13.3 GESTATIONAL HYPERTENSION WITHOUT SIGNIFICANT PROTEINURIA IN THIRD TRIMESTER: Primary | ICD-10-CM

## 2024-02-08 DIAGNOSIS — O28.3 ABNORMAL PRENATAL ULTRASOUND: ICD-10-CM

## 2024-02-08 LAB
ALT SERPL-CCNC: 8 UNIT/L (ref 0–55)
AST SERPL-CCNC: 13 UNIT/L (ref 5–34)
CREAT SERPL-MCNC: 0.79 MG/DL (ref 0.55–1.02)
ERYTHROCYTE [DISTWIDTH] IN BLOOD BY AUTOMATED COUNT: 12.6 % (ref 11.5–17)
GFR SERPLBLD CREATININE-BSD FMLA CKD-EPI: >60 MLS/MIN/1.73/M2
HCT VFR BLD AUTO: 34 % (ref 37–47)
HGB BLD-MCNC: 11.1 G/DL (ref 12–16)
LDH SERPL-CCNC: 200 U/L (ref 125–220)
MCH RBC QN AUTO: 29.4 PG (ref 27–31)
MCHC RBC AUTO-ENTMCNC: 32.6 G/DL (ref 33–36)
MCV RBC AUTO: 90.2 FL (ref 80–94)
NRBC BLD AUTO-RTO: 0 %
PLATELET # BLD AUTO: 263 X10(3)/MCL (ref 130–400)
PMV BLD AUTO: 11 FL (ref 7.4–10.4)
RBC # BLD AUTO: 3.77 X10(6)/MCL (ref 4.2–5.4)
URATE SERPL-MCNC: 3.6 MG/DL (ref 2.6–6)
WBC # SPEC AUTO: 8.75 X10(3)/MCL (ref 4.5–11.5)

## 2024-02-08 PROCEDURE — 3079F DIAST BP 80-89 MM HG: CPT | Mod: CPTII,S$GLB,, | Performed by: OBSTETRICS & GYNECOLOGY

## 2024-02-08 PROCEDURE — 85027 COMPLETE CBC AUTOMATED: CPT

## 2024-02-08 PROCEDURE — 3008F BODY MASS INDEX DOCD: CPT | Mod: CPTII,S$GLB,, | Performed by: OBSTETRICS & GYNECOLOGY

## 2024-02-08 PROCEDURE — 83615 LACTATE (LD) (LDH) ENZYME: CPT

## 2024-02-08 PROCEDURE — 1159F MED LIST DOCD IN RCRD: CPT | Mod: CPTII,S$GLB,, | Performed by: OBSTETRICS & GYNECOLOGY

## 2024-02-08 PROCEDURE — 99214 OFFICE O/P EST MOD 30 MIN: CPT | Mod: TH,S$GLB,, | Performed by: OBSTETRICS & GYNECOLOGY

## 2024-02-08 PROCEDURE — 76821 MIDDLE CEREBRAL ARTERY ECHO: CPT | Mod: S$GLB,,, | Performed by: OBSTETRICS & GYNECOLOGY

## 2024-02-08 PROCEDURE — 84450 TRANSFERASE (AST) (SGOT): CPT

## 2024-02-08 PROCEDURE — 3074F SYST BP LT 130 MM HG: CPT | Mod: CPTII,S$GLB,, | Performed by: OBSTETRICS & GYNECOLOGY

## 2024-02-08 PROCEDURE — 76819 FETAL BIOPHYS PROFIL W/O NST: CPT | Mod: S$GLB,,, | Performed by: OBSTETRICS & GYNECOLOGY

## 2024-02-08 PROCEDURE — 84460 ALANINE AMINO (ALT) (SGPT): CPT

## 2024-02-08 PROCEDURE — 76820 UMBILICAL ARTERY ECHO: CPT | Mod: S$GLB,,, | Performed by: OBSTETRICS & GYNECOLOGY

## 2024-02-08 PROCEDURE — 36415 COLL VENOUS BLD VENIPUNCTURE: CPT

## 2024-02-08 PROCEDURE — 82565 ASSAY OF CREATININE: CPT

## 2024-02-08 PROCEDURE — 84550 ASSAY OF BLOOD/URIC ACID: CPT

## 2024-02-08 NOTE — PROGRESS NOTES
Please notify patient of below:    1. Preeclampsia labs were reassuring.  Keep appointment next week

## 2024-02-08 NOTE — TELEPHONE ENCOUNTER
----- Message from Anna Asif PA-C sent at 2/8/2024 11:09 AM CST -----  Please notify patient of below:    1. Preeclampsia labs were reassuring.  Keep appointment next week  
Spoke with patient voiced understanding     
alone
alone

## 2024-02-09 DIAGNOSIS — O36.5930 POOR FETAL GROWTH AFFECTING MANAGEMENT OF MOTHER IN THIRD TRIMESTER, SINGLE OR UNSPECIFIED FETUS: Primary | ICD-10-CM

## 2024-02-09 DIAGNOSIS — Z87.59 HISTORY OF ECTOPIC PREGNANCY: ICD-10-CM

## 2024-02-09 DIAGNOSIS — O28.3 ABNORMAL PRENATAL ULTRASOUND: ICD-10-CM

## 2024-02-09 DIAGNOSIS — O13.3 GESTATIONAL HYPERTENSION WITHOUT SIGNIFICANT PROTEINURIA IN THIRD TRIMESTER: ICD-10-CM

## 2024-02-09 DIAGNOSIS — O36.8990 FETAL PERICARDIAL EFFUSION AFFECTING MANAGEMENT OF MOTHER: ICD-10-CM

## 2024-02-09 DIAGNOSIS — O35.BXX0 HYPOPLASTIC LEFT HEART OF FETUS AFFECTING MANAGEMENT OF MOTHER IN SINGLETON PREGNANCY, ANTEPARTUM: ICD-10-CM

## 2024-02-15 ENCOUNTER — TELEPHONE (OUTPATIENT)
Dept: MATERNAL FETAL MEDICINE | Facility: CLINIC | Age: 24
End: 2024-02-15

## 2024-02-15 ENCOUNTER — OFFICE VISIT (OUTPATIENT)
Dept: MATERNAL FETAL MEDICINE | Facility: CLINIC | Age: 24
End: 2024-02-15
Payer: MEDICAID

## 2024-02-15 ENCOUNTER — PROCEDURE VISIT (OUTPATIENT)
Dept: MATERNAL FETAL MEDICINE | Facility: CLINIC | Age: 24
End: 2024-02-15
Payer: MEDICAID

## 2024-02-15 ENCOUNTER — LAB VISIT (OUTPATIENT)
Dept: LAB | Facility: HOSPITAL | Age: 24
End: 2024-02-15
Payer: MEDICAID

## 2024-02-15 VITALS
BODY MASS INDEX: 24.72 KG/M2 | HEIGHT: 65 IN | SYSTOLIC BLOOD PRESSURE: 115 MMHG | DIASTOLIC BLOOD PRESSURE: 75 MMHG | WEIGHT: 148.38 LBS | HEART RATE: 106 BPM

## 2024-02-15 DIAGNOSIS — O35.BXX0 HYPOPLASTIC LEFT HEART OF FETUS AFFECTING MANAGEMENT OF MOTHER IN SINGLETON PREGNANCY, ANTEPARTUM: ICD-10-CM

## 2024-02-15 DIAGNOSIS — O36.8990 FETAL PERICARDIAL EFFUSION AFFECTING MANAGEMENT OF MOTHER: ICD-10-CM

## 2024-02-15 DIAGNOSIS — O13.3 GESTATIONAL HYPERTENSION WITHOUT SIGNIFICANT PROTEINURIA IN THIRD TRIMESTER: ICD-10-CM

## 2024-02-15 DIAGNOSIS — O36.5930 POOR FETAL GROWTH AFFECTING MANAGEMENT OF MOTHER IN THIRD TRIMESTER, SINGLE OR UNSPECIFIED FETUS: ICD-10-CM

## 2024-02-15 DIAGNOSIS — O13.3 GESTATIONAL HYPERTENSION WITHOUT SIGNIFICANT PROTEINURIA IN THIRD TRIMESTER: Primary | ICD-10-CM

## 2024-02-15 DIAGNOSIS — O28.3 ABNORMAL FETAL ULTRASOUND: ICD-10-CM

## 2024-02-15 DIAGNOSIS — Z87.59 HISTORY OF ECTOPIC PREGNANCY: ICD-10-CM

## 2024-02-15 DIAGNOSIS — O28.3 ABNORMAL PRENATAL ULTRASOUND: ICD-10-CM

## 2024-02-15 LAB
ALT SERPL-CCNC: 9 UNIT/L (ref 0–55)
AST SERPL-CCNC: 15 UNIT/L (ref 5–34)
CREAT SERPL-MCNC: 0.77 MG/DL (ref 0.55–1.02)
ERYTHROCYTE [DISTWIDTH] IN BLOOD BY AUTOMATED COUNT: 12.5 % (ref 11.5–17)
GFR SERPLBLD CREATININE-BSD FMLA CKD-EPI: >60 MLS/MIN/1.73/M2
HCT VFR BLD AUTO: 32.9 % (ref 37–47)
HGB BLD-MCNC: 10.9 G/DL (ref 12–16)
LDH SERPL-CCNC: 204 U/L (ref 125–220)
MCH RBC QN AUTO: 28.8 PG (ref 27–31)
MCHC RBC AUTO-ENTMCNC: 33.1 G/DL (ref 33–36)
MCV RBC AUTO: 87 FL (ref 80–94)
NRBC BLD AUTO-RTO: 0 %
PLATELET # BLD AUTO: 259 X10(3)/MCL (ref 130–400)
PMV BLD AUTO: 10.8 FL (ref 7.4–10.4)
RBC # BLD AUTO: 3.78 X10(6)/MCL (ref 4.2–5.4)
URATE SERPL-MCNC: 3.6 MG/DL (ref 2.6–6)
WBC # SPEC AUTO: 10.42 X10(3)/MCL (ref 4.5–11.5)

## 2024-02-15 PROCEDURE — 3008F BODY MASS INDEX DOCD: CPT | Mod: CPTII,S$GLB,, | Performed by: OBSTETRICS & GYNECOLOGY

## 2024-02-15 PROCEDURE — 82565 ASSAY OF CREATININE: CPT

## 2024-02-15 PROCEDURE — 84450 TRANSFERASE (AST) (SGOT): CPT

## 2024-02-15 PROCEDURE — 76820 UMBILICAL ARTERY ECHO: CPT | Mod: S$GLB,,, | Performed by: OBSTETRICS & GYNECOLOGY

## 2024-02-15 PROCEDURE — 3074F SYST BP LT 130 MM HG: CPT | Mod: CPTII,S$GLB,, | Performed by: OBSTETRICS & GYNECOLOGY

## 2024-02-15 PROCEDURE — 1159F MED LIST DOCD IN RCRD: CPT | Mod: CPTII,S$GLB,, | Performed by: OBSTETRICS & GYNECOLOGY

## 2024-02-15 PROCEDURE — 36415 COLL VENOUS BLD VENIPUNCTURE: CPT

## 2024-02-15 PROCEDURE — 84550 ASSAY OF BLOOD/URIC ACID: CPT

## 2024-02-15 PROCEDURE — 84460 ALANINE AMINO (ALT) (SGPT): CPT

## 2024-02-15 PROCEDURE — 85027 COMPLETE CBC AUTOMATED: CPT

## 2024-02-15 PROCEDURE — 3078F DIAST BP <80 MM HG: CPT | Mod: CPTII,S$GLB,, | Performed by: OBSTETRICS & GYNECOLOGY

## 2024-02-15 PROCEDURE — 1160F RVW MEDS BY RX/DR IN RCRD: CPT | Mod: CPTII,S$GLB,, | Performed by: OBSTETRICS & GYNECOLOGY

## 2024-02-15 PROCEDURE — 76819 FETAL BIOPHYS PROFIL W/O NST: CPT | Mod: S$GLB,,, | Performed by: OBSTETRICS & GYNECOLOGY

## 2024-02-15 PROCEDURE — 83615 LACTATE (LD) (LDH) ENZYME: CPT

## 2024-02-15 PROCEDURE — 99213 OFFICE O/P EST LOW 20 MIN: CPT | Mod: TH,S$GLB,, | Performed by: OBSTETRICS & GYNECOLOGY

## 2024-02-15 NOTE — TELEPHONE ENCOUNTER
Pt returned call back. I told Pt the results of her lab. Pt's questions were answered. Pt verbalized understanding.

## 2024-02-15 NOTE — TELEPHONE ENCOUNTER
----- Message from Anna Asif PA-C sent at 2/15/2024  1:24 PM CST -----  Please notify patient of below:    1. Preeclampsia labs were reassuring    Left message

## 2024-02-15 NOTE — PROGRESS NOTES
Maternal Fetal Medicine Follow Up    Subjective     Patient ID: [id number]    Chief Complaint: mfm followup w/us (Pt c/o pain and pressure in pelvic area. Also, stomach cramps off and on.)      HPI: Izzy Palacio is a 23 y.o. female  at 34w5d gestation with Estimated Date of Delivery: 3/23/24  who is here for follow  up consultation by [facility].    She had abnormal four-chamber heart views on outside ultrasound and was suspected to have hypoplastic left heart on consult ultrasound, for which a fetal echocardiogram was ordered.  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  She was also noted to have fetal growth restriction and diagnosed with gestational hypertension on 1/10/2024 with elevated S/D ratio on umbilical artery Doppler.  She had an  amniocentesis on 2024 that showed normal microarray and negative acetylcholine esterase. 24 hour urine on 1/10/24 showed protein level unable to calculate.  Most recent preeclampsia labs on 2024 were reassuring with platelets 263, creatinine 0.79, uric acid 3.6, AST 13, ALT 8, . She was seen at [facility] for consultation on 2024 due to planned delivery in [place]. She also had a congenital Cardiothoracic surgery consult.  On the recommendation of pediatric Cardiothoracic surgery, the further we get closer to 39 weeks the better chance of better outcomes in successful surgery.  Because of that plan is to try to get to delivery close to 38 weeks.She has history of a ruptured ectopic pregnancy in 2023 requiring intensive care.  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  She had incidental fetal pericardial effusion seen on 2024 with normal  "MCA Doppler.            Interval history since last [facility] visit: None.. She denies any leaking fluid, vaginal bleeding, contractions, decreased fetal movement. Denies headaches, visual disturbances, or epigastric pain.    Pregnancy complications include:   Patient Active Problem List   Diagnosis    Poor fetal growth affecting management of mother in third trimester    Elevated S/D ratio on umbilical artery Doppler on 1/10/2024    Hypoplastic left heart of fetus affecting management of mother in corbett pregnancy, antepartum    Gestational hypertension without significant proteinuria in third trimester    History of ectopic pregnancy    Fetal pelvic kidney, left    Fetal pericardial effusion affecting management of mother        No changes to medical, surgical, family, social, or obstetric history.    Medications:  Current Outpatient Medications   Medication Instructions    oxyCODONE (ROXICODONE) 5 mg, Oral, Every 4 hours PRN    PNV no.153/FA/om3/dha/epa/fish (PRENATAL GUMMIES ORAL) Oral    prenatal vit 55-iron-folic-om3 29-1-430 mg CPKD Oral       Review of Systems   12 point review of systems conducted, negative except as stated in the history of present illness. See HPI for details.      Objective     Visit Vitals  /75 (BP Location: Left arm, Patient Position: Sitting, BP Method: Large (Automatic))   Pulse 106   Ht 5' 5" (1.651 m)   Wt 67.3 kg (148 lb 6.4 oz)   LMP 02/21/2023 (Approximate)   BMI 24.70 kg/m²        Physical Exam  Vitals and nursing note reviewed.   Constitutional:       Appearance: Normal appearance.   HENT:      Head: Normocephalic and atraumatic.      Nose: Nose normal. No congestion.   Cardiovascular:      Rate and Rhythm: Normal rate.   Pulmonary:      Effort: Pulmonary effort is normal.   Skin:     Findings: No rash.   Neurological:      Mental Status: She is alert and oriented to person, place, and time.   Psychiatric:         Mood and Affect: Mood normal.         Behavior: Behavior " normal.         Thought Content: Thought content normal.         Judgment: Judgment normal.         ASSESSMENT/PLAN:     23 y.o.  female with IUP at 34w5d    Fetal growth restriction with previous abnormal umbilical artery Doppler normal on 02/15/2024  There is improved and continued fetal growth with an EFW of 1729 g at the 15% and the AC at the 17% on 2024.  AFV is normal. BPP is 8/8.  Umbilical artery Doppler is improved today with the S/D ratio of 3.2.    She had amniocentesis that showed normal microarray and acetylcholine esterase. She already did quad screen that was negative. She was advised of need for  evaluation.    Potential etiologies of FGR include but are not limited to normal variation of stature, placental insufficiency, chromosomal abnormalities, genetic disorders, infections, medical conditions, teratogen exposure and other etiologies.      Complications of growth-restricted neonates include hypoglycemia, hyperbilirubinemia, hypothermia, seizures, sepsis, IVH, RDS, necrotizing enterocolitis, and  asphyxia. Long-term complications include cognitive delay and adult diseases such as cardiovascular disorders and type 2 diabetes. There is an increased risk (~20%) for recurrence of fetal growth restriction in a future pregnancy.    Because of increased risk of stillbirth, will monitor interval fetal growth every 3 weeks and do twice weekly fetal testing, including an NST at least once per week. She was previously instructed that fetal testing is not a 100% protective against the risk of fetal demise in-utero. Reviewed the importance of doing fetal kick counts three times a day and resting in a lateral recumbent position and reporting immediately at any time there is any decreased fetal movement even if the same day of testing was emphasized.     Delivery is not indicated at this time, as risks of  mortality and morbidity with delivery, outweigh risks with continued  pregnancy. Likewise, with stable condition, or too early a gestational age, steroids (and magnesium sulfate) are not recommended, as benefit is reaped if suspected imminent delivery in few days which, although possible, is very unlikely at this time.  As discussed before, because of need for bigger size baby and gestational age of 38 weeks would be better for pediatric Cardiology, we will try to get to 38 weeks rather than 37 weeks as it would provide the chance for better outcomes for the baby.  We will plan to continue twice weekly fetal testing and try to get to that is stage in the pregnancy with consideration for hospitalization during that 37th week prior to delivery at 38 weeks.  Obviously earlier delivery may be needed for worsening maternal or fetal condition.    Patient understands the risks and is in agreement with plan of care.      Fetal pericardial effusion  With aneuploidy being in differential, patient had amniocentesis that showed normal microarray and acetylcholine esterase.     With fetal anemia being a differential for pericardial effusion, a MCA doppler was done on 2024 as a screening for fetal anemia. Normal MCA doppler with PSV at 1.30 MoM with no evidence of and low suspicion for fetal anemia, no further MCA assessment warranted at this time. There is no evidence of fetal hydrops at this time.  She has known fetal hypoplastic left heart syndrome.  She was advised of need for  evaluation.       Gestational hypertension  BP Readings from Last 1 Encounters:   02/15/24 115/75   Discussed risks with hypertension in pregnancy, including FGR, abruption and preeclampsia/eclampsia. Advised of low sodium diet avoiding any excessive weight gain.     She is asymptomatic.    Reviewed with the patient need for twice weekly follow-up with at least weekly labs, with more frequent labs if worsening blood pressure or clinical condition.  Patient has a blood pressure machine at home and will be  checking BP 2-3 times a day.  She was given instructions to come in immediately if she has decreased fetal movements, headaches, vision problems, or epigastric pain.  She is also to come in if blood pressure is over 160 systolic or 105 diastolic.      On 2024, preeclampsia labs were reassuring. 24 hour urine on 1/10/24 showed to low protein to calculate. We will continue doing weekly preeclampsia labs and twice weekly fetal testing until delivery.  She already received a course of steroids on 1/10 and 2024.    See delivery recommendations as above.      Fetal hypoplastic left heart syndrome  Hypoplastic left heart syndrome (HLHS) is characterized by underdevelopment of the left-sided heart structures with significant hypoplasia of the left ventricle (LV), stenosis or atresia of the mitral and/or aortic valves, and hypoplasia of the ascending aorta and aortic arch.      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     There is an increased risk of genetic abnormalities, particularly aneuploidy or 22q11 deletion, in fetuses with CHD; the frequency depends on the specific lesion. She had amniocentesis that showed normal microarray and acetylcholine esterase. She is aware of the need for  evaluation.    Delivery should be planned at a facility with the appropriate level of care for the mother and . Neonates with ductal-dependent lesions and most with critical cardiac lesions should be delivered at a facility with a level III  intensive care unit (NICU) and pediatric cardiology expertise.  Early delivery and  delivery are generally performed for standard obstetric indications.      She understands that delivery at a tertiary facility like in [place] with pediatric Cardiothoracic surgery we would be beneficial.  She has had the appropriate  consultations with [place] team.      Left fetal pelvic kidney  Although outcome data are limited, case series suggest that individuals with renal ectopic or fusion anomalies are at risk for long-term renal sequalae compared with those without these anomalies. These include decreased renal function and higher rates of urinary tract obstruction, kidney stones, urogenital cancer, and end-stage kidney disease.     Reviewed limitations of ultrasound and chromosomal microarray in diagnosing all coexisting anomalies or genetic conditions.  Reviewed need for  evaluation.      Blood pressure is normal.  Umbilical artery Doppler is better today.  We will repeat preeclampsia labs today.  Have NST Monday with Dr. Keenan and we will see her next week.  Fetal kick count instructions and preeclampsia warnings    Follow up in about 1 week (around 2024) for MFM follow-up, BPP, UAD, Repeat ultrasound.     Future Appointments   Date Time Provider Department Center   3/5/2024  1:00 PM Benigno Freeman MD [facility]   3/5/2024  1:00 PM CV [facility] FETAL ECHO  [facility]   2024  9:10 AM Niya Thomas FNP [facility] GYN [facility] [facility]      ELVIRA involvement: Patient was evaluated and examined by Dr. Pollard. LINDA Montano, helped as  scribe in completion of part of note.    This note was created with the assistance of [employer] voice recognition software. There may be transcription errors as a result of using this technology, however minimal. Effort has been made to ensure accuracy of transcription, but any obvious errors or omissions should be clarified with the author of the document.

## 2024-02-20 DIAGNOSIS — O13.3 GESTATIONAL HYPERTENSION WITHOUT SIGNIFICANT PROTEINURIA IN THIRD TRIMESTER: Primary | ICD-10-CM

## 2024-02-20 NOTE — PROGRESS NOTES
[facility] Fetal Medicine ([place]) Fetal Cardiology Clinic    Today, I had the pleasure of evaluating Izzy Palacio who is now 23 y.o. and carrying her second pregnancy at 31 5/7 weeks gestation with an DENISE of 3/23/24. She was referred for evaluation of the fetal heart due to a history of fetal hypoplastic left heart syndrome.      She is carrying a female fetus, named Gianna.      Obstetric History:    .  Her OB care is by Dr. Keenan.  Her MFM care is by Renny Pollard and [place].    Past Medical History:   Diagnosis Date    Anemia     NOT SURE IF CURRENT OR NOT         Current Outpatient Medications:     PNV no.153/FA/om3/dha/epa/fish (PRENATAL GUMMIES ORAL), Take by mouth., Disp: , Rfl:     oxyCODONE (ROXICODONE) 5 MG immediate release tablet, Take 1 tablet (5 mg total) by mouth every 4 (four) hours as needed for Pain. (Patient not taking: Reported on 1/10/2024), Disp: 15 tablet, Rfl: 0    prenatal vit 55-iron-folic-om3 29-1-430 mg CPKD, Take by mouth., Disp: , Rfl:     Family History: Negative for congenital heart disease, early coronary artery disease, sudden unexplained death, connective tissues disorders, genetic syndromes, multiple miscarriages or other congenital anomalies.    Social History: Ms. Palacio is single.  She is from [place].    FETAL ECHOCARDIOGRAM (summary):  Fetal echocardiogram at 31 5/7 weeks gestation for a history of fetal hypoplastic left heart syndrome. DENISE 3/23/24. 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 Severely hypoplastic aortic arch that fills in a retrograde manner. Left to right shunting across the atrial septum. Pulmonary vein prograde to retrograde inflow VTI index > 10:1 indicating a low risk atrial septum. Normal tricuspid valve with no regurgitation. Normal pulmonary valve with no obstruction or regurgitation. Severely hypoplastic left ventricle. Moderately dilated right atrium and ventricle. Normal right ventricular systolic function.  Small to moderate sized pericardial effusion around the RV free wall. Normal sinus rhythm. No ectopy or sustained arrhythmia demonstrated throughout the study. No ventricular level shunting.   (Full report in electronic medical record)    Impression:  Single active female fetus at 31 wga.  Single active fetus at 31 wga.  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  My expectation is that this fetus will deliver at full term.  The baby will have a prostaglandin infusion after birth to maintain the patency of the ductus arteriosus.  After birth, the baby will have a Pek[place] surgery in the first week of life.  This will provide stable systemic and pulmonary blood flow.  The baby will stay in the Pediatric Cardiovascular Intensive Care Unit for the first 30 days after the P[place]in since this is the highest risk period for serious complications or death.  Our surgical outcomes for the Krystal surgery are excellent and our mortality risk is less than 10% and has been 0% for the past two years.  Two somewhat common complications after surgery include poor oral feeding and the need for a gastric tube and respiratory failure and the need for a tracheostomy.  The risk of a G tube is about 50% and the risk for a tracheostomy is less than 5%.  After discharge, the baby will be followed closely in clinic about once a week until the second surgery, the Nazario at about 6 months of age.  At this point, the baby's hemodynamics are much more stable and follow up requirements are much less stringent.  The final surgery is at around 3-5 years of age and is called the Fontan operation.  This provides close to normal systemic oxygen levels.  Long term mortality expectations are 60 to 70 percent survival to 5 years in all infants who undergo the Krystal procedure and rise to 90 percent survival to 18 years in patients who survive to 12 months.  These patients hearts will never be considered normal, and they  will have to follow with a cardiologist for their entire lives.  Finally, these patients have special neurodevelopmental issues that are well known.  At [facility], we have a special cardiac neurodevelopmental follow up clinic to assess and address these concerns.     I discussed with her that fetal echocardiography is insufficiently sensitive to rule out all septal defects, anomalies of pulmonary and systemic veins, arch anomalies, and some valvar abnormalities, nor can it ensure that the ductus arteriosus and foramen ovale will spontaneously close.     Recommendations:  1. 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 Any prenatal genetic diagnoses or other major associated abnormalities, conditions: none  3. Primary Fetal Cardiologist: Lydia  Delivery and post  recommendations as of last fetal visit:  1. Recommend delivery at tertiary care center such as [facility] where Pediatric Cardiology and Congenital Heart Surgery care are immediately available  2. From fetal cardiac perspective, ok for vaginal delivery, preferably after 39 weeks gestation  3. Recommend scheduled weekday delivery during daytime hours so that all needed caretakers are immediately available  4. PGE: is necessary and should be started post delivery.   Brief echocardiogram before umbilical lines to confirm anatomy, followed by complete echocardiogram.  5. Likely need for urgent intervention within first hours after delivery: No   6. Cardiology Consultant recommended to attend delivery: No   7. Transition to CVICU    8. Cardiology consult: Yes  9.   Genetics recommendation:CMA  10.  surgical or cath intervention: [place] surgery      The above information was discussed in detail including the use of diagrams, with 60 minutes of total face to face time, with greater than 50% with counseling and coordination of care.  The discussion of the diagnosis and treatment  options is as described above.      Benigno Freeman MD, MPH  Pediatric and Fetal Cardiology  [facility]   [address] [place] [address]    Office: [phone]  Cell: [phone]

## 2024-02-22 ENCOUNTER — PROCEDURE VISIT (OUTPATIENT)
Dept: MATERNAL FETAL MEDICINE | Facility: CLINIC | Age: 24
End: 2024-02-22
Payer: MEDICAID

## 2024-02-22 ENCOUNTER — OFFICE VISIT (OUTPATIENT)
Dept: MATERNAL FETAL MEDICINE | Facility: CLINIC | Age: 24
End: 2024-02-22
Payer: MEDICAID

## 2024-02-22 ENCOUNTER — LAB VISIT (OUTPATIENT)
Dept: LAB | Facility: HOSPITAL | Age: 24
End: 2024-02-22
Payer: MEDICAID

## 2024-02-22 VITALS
SYSTOLIC BLOOD PRESSURE: 125 MMHG | HEART RATE: 99 BPM | WEIGHT: 151 LBS | HEIGHT: 65 IN | BODY MASS INDEX: 25.16 KG/M2 | DIASTOLIC BLOOD PRESSURE: 84 MMHG

## 2024-02-22 DIAGNOSIS — O28.3 ABNORMAL PRENATAL ULTRASOUND: ICD-10-CM

## 2024-02-22 DIAGNOSIS — O13.3 GESTATIONAL HYPERTENSION WITHOUT SIGNIFICANT PROTEINURIA IN THIRD TRIMESTER: ICD-10-CM

## 2024-02-22 DIAGNOSIS — O35.BXX0 HYPOPLASTIC LEFT HEART OF FETUS AFFECTING MANAGEMENT OF MOTHER IN SINGLETON PREGNANCY, ANTEPARTUM: ICD-10-CM

## 2024-02-22 DIAGNOSIS — O36.8990 FETAL PERICARDIAL EFFUSION AFFECTING MANAGEMENT OF MOTHER: ICD-10-CM

## 2024-02-22 DIAGNOSIS — O13.3 GESTATIONAL HYPERTENSION WITHOUT SIGNIFICANT PROTEINURIA IN THIRD TRIMESTER: Primary | ICD-10-CM

## 2024-02-22 DIAGNOSIS — O28.3 ABNORMAL FETAL ULTRASOUND: ICD-10-CM

## 2024-02-22 DIAGNOSIS — Z87.59 HISTORY OF ECTOPIC PREGNANCY: ICD-10-CM

## 2024-02-22 DIAGNOSIS — O36.5930 POOR FETAL GROWTH AFFECTING MANAGEMENT OF MOTHER IN THIRD TRIMESTER, SINGLE OR UNSPECIFIED FETUS: ICD-10-CM

## 2024-02-22 LAB
ALT SERPL-CCNC: 9 UNIT/L (ref 0–55)
AST SERPL-CCNC: 16 UNIT/L (ref 5–34)
CREAT SERPL-MCNC: 0.74 MG/DL (ref 0.55–1.02)
ERYTHROCYTE [DISTWIDTH] IN BLOOD BY AUTOMATED COUNT: 12.6 % (ref 11.5–17)
GFR SERPLBLD CREATININE-BSD FMLA CKD-EPI: >60 MLS/MIN/1.73/M2
HCT VFR BLD AUTO: 34.5 % (ref 37–47)
HGB BLD-MCNC: 11 G/DL (ref 12–16)
LDH SERPL-CCNC: 226 U/L (ref 125–220)
MCH RBC QN AUTO: 28.2 PG (ref 27–31)
MCHC RBC AUTO-ENTMCNC: 31.9 G/DL (ref 33–36)
MCV RBC AUTO: 88.5 FL (ref 80–94)
NRBC BLD AUTO-RTO: 0 %
PLATELET # BLD AUTO: 293 X10(3)/MCL (ref 130–400)
PMV BLD AUTO: 11.4 FL (ref 7.4–10.4)
RBC # BLD AUTO: 3.9 X10(6)/MCL (ref 4.2–5.4)
URATE SERPL-MCNC: 3.9 MG/DL (ref 2.6–6)
WBC # SPEC AUTO: 9.31 X10(3)/MCL (ref 4.5–11.5)

## 2024-02-22 PROCEDURE — 1160F RVW MEDS BY RX/DR IN RCRD: CPT | Mod: CPTII,S$GLB,,

## 2024-02-22 PROCEDURE — 99213 OFFICE O/P EST LOW 20 MIN: CPT | Mod: TH,S$GLB,,

## 2024-02-22 PROCEDURE — 76816 OB US FOLLOW-UP PER FETUS: CPT | Mod: S$GLB,,, | Performed by: OBSTETRICS & GYNECOLOGY

## 2024-02-22 PROCEDURE — 36415 COLL VENOUS BLD VENIPUNCTURE: CPT

## 2024-02-22 PROCEDURE — 84460 ALANINE AMINO (ALT) (SGPT): CPT

## 2024-02-22 PROCEDURE — 84450 TRANSFERASE (AST) (SGOT): CPT

## 2024-02-22 PROCEDURE — 84550 ASSAY OF BLOOD/URIC ACID: CPT

## 2024-02-22 PROCEDURE — 3079F DIAST BP 80-89 MM HG: CPT | Mod: CPTII,S$GLB,,

## 2024-02-22 PROCEDURE — 1159F MED LIST DOCD IN RCRD: CPT | Mod: CPTII,S$GLB,,

## 2024-02-22 PROCEDURE — 82565 ASSAY OF CREATININE: CPT

## 2024-02-22 PROCEDURE — 3008F BODY MASS INDEX DOCD: CPT | Mod: CPTII,S$GLB,,

## 2024-02-22 PROCEDURE — 76820 UMBILICAL ARTERY ECHO: CPT | Mod: S$GLB,,, | Performed by: OBSTETRICS & GYNECOLOGY

## 2024-02-22 PROCEDURE — 76819 FETAL BIOPHYS PROFIL W/O NST: CPT | Mod: S$GLB,,, | Performed by: OBSTETRICS & GYNECOLOGY

## 2024-02-22 PROCEDURE — 85027 COMPLETE CBC AUTOMATED: CPT

## 2024-02-22 PROCEDURE — 3074F SYST BP LT 130 MM HG: CPT | Mod: CPTII,S$GLB,,

## 2024-02-22 PROCEDURE — 83615 LACTATE (LD) (LDH) ENZYME: CPT

## 2024-02-22 NOTE — PROGRESS NOTES
Maternal Fetal Medicine Follow Up    Subjective     Patient ID: [id number]    Chief Complaint: [facility] follow up with US      HPI: Izzy Palacio is a 23 y.o. female  at 35w5d gestation with Estimated Date of Delivery: 3/23/24  who is here for follow  up consultation by [facility].    She had abnormal four-chamber heart views on outside ultrasound and was suspected to have hypoplastic left heart on consult ultrasound, for which a fetal echocardiogram was ordered.  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  She was also noted to have fetal growth restriction and diagnosed with gestational hypertension on 1/10/2024 with elevated S/D ratio on umbilical artery Doppler.  She had an  amniocentesis on 2024 that showed normal microarray and negative acetylcholine esterase. 24 hour urine on 1/10/24 showed protein level unable to calculate.  Most recent preeclampsia labs on 2/15/2024 were reassuring with platelets 259, creatinine 0.77, uric acid 3.6, AST 15, ALT 9, . She was seen at [facility] for consultation on 2024 due to planned delivery in [place]. She also had a congenital Cardiothoracic surgery consult.  On the recommendation of pediatric Cardiothoracic surgery, the further we get closer to 39 weeks the better chance of better outcomes in successful surgery.  Because of that plan is to try to get to delivery close to 38 weeks. She has history of a ruptured ectopic pregnancy in 2023 requiring intensive care.  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  She had incidental fetal pericardial effusion seen on 2024 with normal MCA Doppler.        Interval history since last [facility] visit: None.. She  "denies any leaking fluid, vaginal bleeding, contractions, decreased fetal movement. Denies headaches, visual disturbances, or epigastric pain.    Pregnancy complications include:   Patient Active Problem List   Diagnosis    Poor fetal growth affecting management of mother in third trimester    Elevated S/D ratio on umbilical artery Doppler on 1/10/2024    Hypoplastic left heart of fetus affecting management of mother in [place] pregnancy, antepartum    Gestational hypertension without significant proteinuria in third trimester    History of ectopic pregnancy    Fetal pelvic kidney, left    Fetal pericardial effusion affecting management of mother        No changes to medical, surgical, family, social, or obstetric history.    Medications:  Current Outpatient Medications   Medication Instructions    oxyCODONE (ROXICODONE) 5 mg, Oral, Every 4 hours PRN    PNV no.153/FA/om3/dha/epa/fish (PRENATAL GUMMIES ORAL) Oral    prenatal vit 55-iron-folic-om3 29-1-430 mg CPKD Oral       Review of Systems   12 point review of systems conducted, negative except as stated in the history of present illness. See HPI for details.      Objective     Visit Vitals  /84 (BP Location: Left arm, Patient Position: Sitting, BP Method: Medium (Automatic))   Pulse 99   Ht 5' 5" (1.651 m)   Wt 68.5 kg (151 lb)   LMP 02/21/2023 (Approximate)   BMI 25.13 kg/m²        Physical Exam  Vitals and nursing note reviewed.   Constitutional:       Appearance: Normal appearance.   HENT:      Head: Normocephalic and atraumatic.      Nose: Nose normal. No congestion.   Cardiovascular:      Rate and Rhythm: Normal rate.   Pulmonary:      Effort: Pulmonary effort is normal.   Skin:     Findings: No rash.   Neurological:      Mental Status: She is alert and oriented to person, place, and time.   Psychiatric:         Mood and Affect: Mood normal.         Behavior: Behavior normal.         Thought Content: Thought content normal.         Judgment: Judgment " normal.         ASSESSMENT/PLAN:     23 y.o.  female with IUP at 35w5d    Fetal growth restriction with previous abnormal umbilical artery Doppler   There is mild fetal growth restriction with continued but slowing fetal growth with an EFW of 2123 g at the 6% and the AC at the 7% on 2024.  AFV is normal. BPP is 8/8.  Umbilical artery Doppler is normal today.    She had amniocentesis that showed normal microarray and acetylcholine esterase. She already did quad screen that was negative. She was advised of need for  evaluation.    Potential etiologies of FGR include but are not limited to normal variation of stature, placental insufficiency, chromosomal abnormalities, genetic disorders, infections, medical conditions, teratogen exposure and other etiologies.      Complications of growth-restricted neonates include hypoglycemia, hyperbilirubinemia, hypothermia, seizures, sepsis, IVH, RDS, necrotizing enterocolitis, and  asphyxia. Long-term complications include cognitive delay and adult diseases such as cardiovascular disorders and type 2 diabetes. There is an increased risk (~20%) for recurrence of fetal growth restriction in a future pregnancy.    Because of increased risk of stillbirth, will monitor interval fetal growth every 3 weeks and do twice weekly fetal testing, including an NST at least once per week. She was previously instructed that fetal testing is not a 100% protective against the risk of fetal demise in-utero. Reviewed the importance of doing fetal kick counts three times a day and resting in a lateral recumbent position and reporting immediately at any time there is any decreased fetal movement even if the same day of testing was emphasized.     Delivery is not indicated at this time, as risks of  mortality and morbidity with delivery, outweigh risks with continued pregnancy. Likewise, with stable condition, or too early a gestational age, steroids (and magnesium  sulfate) are not recommended, as benefit is reaped if suspected imminent delivery in few days which, although possible, is very unlikely at this time.  As discussed before, because of need for bigger size baby and gestational age of 38 weeks would be better for pediatric Cardiology, we will try to get to 38 weeks rather than 37 weeks as it would provide the chance for better outcomes for the baby.  We will plan to continue twice weekly fetal testing and try to get to that is stage in the pregnancy with consideration for hospitalization during that 37th week prior to delivery at 38 weeks.  Obviously earlier delivery may be needed for worsening maternal or fetal condition.    She has delivery scheduled for 3/10/2024.  Patient understands the risks and is in agreement with plan of care.      Fetal pericardial effusion  With aneuploidy being in differential, patient had amniocentesis that showed normal microarray and acetylcholine esterase.     With fetal anemia being a differential for pericardial effusion, a MCA doppler was done on 2024 as a screening for fetal anemia. Normal MCA doppler with PSV at 1.30 MoM with no evidence of and low suspicion for fetal anemia, no further MCA assessment warranted at this time. There is no evidence of fetal hydrops at this time.  She has known fetal hypoplastic left heart syndrome.  She was advised of need for  evaluation.       Gestational hypertension  BP Readings from Last 1 Encounters:   24 125/84   Discussed risks with hypertension in pregnancy, including FGR, abruption and preeclampsia/eclampsia. Advised of low sodium diet avoiding any excessive weight gain.     She is asymptomatic.    Reviewed with the patient need for twice weekly follow-up with at least weekly labs, with more frequent labs if worsening blood pressure or clinical condition.  Patient has a blood pressure machine at home and will be checking BP 2-3 times a day.  She was given instructions to  come in immediately if she has decreased fetal movements, headaches, vision problems, or epigastric pain.  She is also to come in if blood pressure is over 160 systolic or 105 diastolic.      On 2/15/2024, preeclampsia labs were reassuring. 24 hour urine on 1/10/24 showed to low protein to calculate. We will continue doing weekly preeclampsia labs and twice weekly fetal testing until delivery.  She already received a course of steroids on 1/10 and 2024.    See delivery plan as above.      Fetal hypoplastic left heart syndrome  Hypoplastic left heart syndrome (HLHS) is characterized by underdevelopment of the left-sided heart structures with significant hypoplasia of the left ventricle (LV), stenosis or atresia of the mitral and/or aortic valves, and hypoplasia of the ascending aorta and aortic arch.      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     There is an increased risk of genetic abnormalities, particularly aneuploidy or 22q11 deletion, in fetuses with CHD; the frequency depends on the specific lesion. She had amniocentesis that showed normal microarray and acetylcholine esterase. She is aware of the need for  evaluation.    Delivery should be planned at a facility with the appropriate level of care for the mother and . Neonates with ductal-dependent lesions and most with critical cardiac lesions should be delivered at a facility with a level III  intensive care unit (NICU) and pediatric cardiology expertise.  Early delivery and  delivery are generally performed for standard obstetric indications.      She understands that delivery at a tertiary facility like in [place] with pediatric Cardiothoracic surgery we would be beneficial.  She has had the appropriate consultations with [place] team.      Left fetal pelvic  kidney  Although outcome data are limited, case series suggest that individuals with renal ectopic or fusion anomalies are at risk for long-term renal sequalae compared with those without these anomalies. These include decreased renal function and higher rates of urinary tract obstruction, kidney stones, urogenital cancer, and end-stage kidney disease.     Reviewed limitations of ultrasound and chromosomal microarray in diagnosing all coexisting anomalies or genetic conditions.  Reviewed need for  evaluation.    Follow up in about 1 week (around 2024) for MFM follow-up, BPP, UAD.     Future Appointments   Date Time Provider Department Center   2024  9:45 AM Anna Asif PA-C [facility]   2024  9:45 AM ROOM 1 AND 2, [facility]   3/5/2024  1:00 PM Benigno Freeman MD [facility]   3/5/2024  1:00 PM CV [facility] FETAL ECHO 01 [facility]   2024  9:10 AM Niya Thomas, JAYLA [facility] Landon Asif PA-C     This note was created with the assistance of [employer] voice recognition software. There may be transcription errors as a result of using this technology, however minimal. Effort has been made to ensure accuracy of transcription, but any obvious errors or omissions should be clarified with the author of the document.

## 2024-02-27 ENCOUNTER — HOSPITAL ENCOUNTER (EMERGENCY)
Facility: HOSPITAL | Age: 24
Discharge: HOME | End: 2024-02-27
Attending: SPECIALIST
Payer: MEDICAID

## 2024-02-27 VITALS
HEIGHT: 65 IN | BODY MASS INDEX: 25.33 KG/M2 | TEMPERATURE: 98 F | OXYGEN SATURATION: 98 % | DIASTOLIC BLOOD PRESSURE: 74 MMHG | HEART RATE: 81 BPM | SYSTOLIC BLOOD PRESSURE: 132 MMHG | WEIGHT: 152 LBS | RESPIRATION RATE: 16 BRPM

## 2024-02-27 DIAGNOSIS — O28.3 ABNORMAL FETAL ULTRASOUND: ICD-10-CM

## 2024-02-27 DIAGNOSIS — O36.5930 POOR FETAL GROWTH AFFECTING MANAGEMENT OF MOTHER IN THIRD TRIMESTER, SINGLE OR UNSPECIFIED FETUS: ICD-10-CM

## 2024-02-27 DIAGNOSIS — O28.3 ABNORMAL PRENATAL ULTRASOUND: Primary | ICD-10-CM

## 2024-02-27 DIAGNOSIS — O36.8990 FETAL PERICARDIAL EFFUSION AFFECTING MANAGEMENT OF MOTHER: ICD-10-CM

## 2024-02-27 PROCEDURE — 99284 EMERGENCY DEPT VISIT MOD MDM: CPT

## 2024-02-27 NOTE — ED PROVIDER NOTES
"       JT NOTE     Admit Date: 2024  JT Physician: Duy Baker  Primary OBGYN: Dr. Dennis Keenan    Chief Complaint   Patient presents with    Abdominal Pain     Iup at 36.3 with c/o contractions. Ob is emilia keenan. Pt is to deliver in [facility] Course:  Izzy Palacio is a 23 y.o.  at 36w3d presents complaining of contractions every 15-20 minutes.    This IUP is complicated by hypoplastic left heart fetus affecting management of mother.  Other fetal defects.  Gestational hypertension in 3rd trimester.  Patient will be delivered in [place] next week..    Patient denies vaginal bleeding, leakage of fluid, headache, vision changes, RUQ pain, dysuria, fever, and nausea/vomiting.  Fetal Movement: normal.      /74   Pulse 81   Temp 98.4 °F (36.9 °C) (Oral)   Resp 16   Ht 5' 5" (1.651 m)   Wt 68.9 kg (152 lb)   LMP 2023 (Approximate)   SpO2 98%   Breastfeeding No   BMI 25.29 kg/m²   Temp:  [98.4 °F (36.9 °C)] 98.4 °F (36.9 °C)  Pulse:  [71-86] 81  Resp:  [16] 16  SpO2:  [98 %-100 %] 98 %  BP: (132-134)/(74-75) 132/74    General: in no apparent distress  Cardiovascular: regular rate and rhythm no murmurs  Respiratory: clear to auscultation, no wheezes, rales or rhonchi, symmetric air entry  Abdominal: fundus soft, nontender 36 weeks size FHT present  Back: lumbar tenderness present CVA tenderness none  Extremeties no redness or tenderness in the calves or thighs no edema    SSE:   SVE:  Long thick closed and posterior      FHT: Category 1  TOCO:  1 isolated contraction noted in observation.    Medical Decision Making:  Signs and symptoms of active labor versus [place] Lopez contractions expected in 3rd trimester pregnancy were discussed with the patient.  Questions were answered to her satisfaction.      LABS:   No results found for this or any previous visit (from the past 24 hour(s)).  [unfilled]     Imaging Results    None          ASSESMENT: Izzy Goodrich " Hakeem is a 23 y.o.   at 36w3d with Drew Lopez contractions now resolved.    Discharge Diagnosis/Clinical Impression:  Pregnancy 36 weeks.  Uterine inertia.    Status:Improved/stable    Disposition:  discharged to home    Medications:       Patient Instructions:   Current Discharge Medication List        CONTINUE these medications which have NOT CHANGED    Details   PNV no.153/FA/om3/dha/epa/fish (PRENATAL GUMMIES ORAL) Take by mouth.      oxyCODONE (ROXICODONE) 5 MG immediate release tablet Take 1 tablet (5 mg total) by mouth every 4 (four) hours as needed for Pain.  Qty: 15 tablet, Refills: 0    Comments: Quantity prescribed more than 7 day supply? No      prenatal vit 55-iron-folic-om3 29-1-430 mg CPKD Take by mouth.             - Pt was given routine pregnancy instructions including to return to triage if she had any vaginal bleeding (other than spotting for the next 48hrs), any loss of fluid like her water broke, decreased fetal movement, or contractions Q 5min lasting for 2 or more hours. Pt was also instructed to drink copious water. Patient voiced understanding of all these instructions and was subsequently discharged home.    She will follow up with her primary OB.  Patient has Maternal-Fetal Medicine visit in 2 days and will discuss delivery with him as well.    No discharge procedures on file.    Duy Baker MD  OB-GYN Hospitalist       Duy Baker MD  [phone]

## 2024-02-28 NOTE — PROGRESS NOTES
Maternal Fetal Medicine Follow Up    Subjective     Patient ID: [id number]    Chief Complaint: high risk pregnancy followup      HPI: Izzy Palacio is a 23 y.o. female  at 36w5d gestation with Estimated Date of Delivery: 3/23/24  who is here for follow  up consultation by [facility].    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 Incidental fetal pericardial effusion seen on 2024 with normal MCA Doppler. 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 Fetal growth restriction with elevated S/D ratio on umbilical artery Doppler noted on 1/10/24 and patient also diagnosed with gestational hypertension on 1/10/2024. She is s/p a course of steroids on 1/10 and 2024. She had an  amniocentesis on 2024 that showed normal microarray and negative acetylcholine esterase. 24 hour urine on 1/10/24 showed protein level unable to calculate.  Most recent preeclampsia labs on 24 platelets 293, creatinine 0.74, uric acid 3.9, AST 16, ALT 9, . She was seen at [facility] for consultation on 2024 due to planned delivery in [place]. She also had a congenital Cardiothoracic surgery consult. She has history of a ruptured ectopic pregnancy in 2023 requiring intensive care.         Interval history since last [facility] visit: None.. She denies any leaking fluid, vaginal bleeding, contractions, decreased fetal movement. Denies headaches, visual disturbances, or epigastric pain.    Pregnancy complications include:   Patient Active Problem List   Diagnosis    Poor fetal growth affecting management of mother in third trimester    Elevated S/D ratio on umbilical artery Doppler on 1/10/2024    Hypoplastic left heart of fetus affecting management of mother in corbett pregnancy, antepartum    Gestational hypertension without significant proteinuria in third  "trimester    History of ectopic pregnancy    Fetal pelvic kidney, left    Fetal pericardial effusion affecting management of mother        No changes to medical, surgical, family, social, or obstetric history.    Medications:  Current Outpatient Medications   Medication Instructions    oxyCODONE (ROXICODONE) 5 mg, Oral, Every 4 hours PRN    PNV no.153/FA/om3/dha/epa/fish (PRENATAL GUMMIES ORAL) Oral    prenatal vit 55-iron-folic-om3 29-1-430 mg CPKD Oral       Review of Systems   12 point review of systems conducted, negative except as stated in the history of present illness. See HPI for details.      Objective     Visit Vitals  /70 (BP Location: Left arm, Patient Position: Sitting, BP Method: Medium (Automatic))   Pulse (!) 122   Ht 5' 5" (1.651 m)   Wt 69.4 kg (153 lb)   LMP 2023 (Approximate)   BMI 25.46 kg/m²        Physical Exam  Vitals and nursing note reviewed.   Constitutional:       Appearance: Normal appearance.   HENT:      Head: Normocephalic and atraumatic.      Nose: Nose normal. No congestion.   Cardiovascular:      Rate and Rhythm: Normal rate.   Pulmonary:      Effort: Pulmonary effort is normal.   Skin:     Findings: No rash.   Neurological:      Mental Status: She is alert and oriented to person, place, and time.   Psychiatric:         Mood and Affect: Mood normal.         Behavior: Behavior normal.         Thought Content: Thought content normal.         Judgment: Judgment normal.         ASSESSMENT/PLAN:     23 y.o.  female with IUP at 36w5d    Fetal growth restriction with previous abnormal umbilical artery Doppler , with a hypoplastic left heart syndrome and left pelvic kidney  There is mild fetal growth restriction with continued but slowing fetal growth with an EFW of 2123 g at the 6% and the AC at the 7% on 2024.  AFV is normal. BPP is 8/8.  Umbilical artery Doppler is normal today.    She had amniocentesis that showed normal microarray and acetylcholine esterase. " She already did quad screen that was negative. She is aware of need for  evaluation.    Potential etiologies of FGR include but are not limited to normal variation of stature, placental insufficiency, chromosomal abnormalities, genetic disorders, infections, medical conditions, teratogen exposure and other etiologies.      Complications of growth-restricted neonates include hypoglycemia, hyperbilirubinemia, hypothermia, seizures, sepsis, IVH, RDS, necrotizing enterocolitis, and  asphyxia. Long-term complications include cognitive delay and adult diseases such as cardiovascular disorders and type 2 diabetes. There is an increased risk (~20%) for recurrence of fetal growth restriction in a future pregnancy.    Delivery is not indicated at this time, as risks of  mortality and morbidity with delivery, outweigh risks with continued pregnancy.   As discussed before, because of need for bigger size baby for correction of congenital heart disease,, delivery at gestational age of 38 weeks would be better for pediatric Cardiology. So, we will try to get to 38 weeks rather than 37 weeks as it would provide the chance for better outcomes for the baby.      We will plan to continue twice weekly fetal testing and try to get to that is stage in the pregnancy with consideration for hospitalization during that 37th week prior to delivery at 38 weeks. Obviously earlier delivery may be needed for worsening maternal or fetal condition.    Reviewed that fetal testing is not a 100% protective against the risk of fetal demise in-utero. Reviewed the importance of doing fetal kick counts three times a day and resting in a lateral recumbent position and reporting immediately at any time there is any decreased fetal movement even if the same day of testing.    She has delivery scheduled for 3/10/2024.  Patient understands the risks and is in agreement with plan of care.      Fetal pericardial effusion  With  aneuploidy being in differential, patient had amniocentesis that showed normal microarray and acetylcholine esterase.     With fetal anemia being a differential for pericardial effusion, a MCA doppler was done on 2024 as a screening for fetal anemia. Normal MCA doppler with PSV at 1.30 MoM with no evidence of and low suspicion for fetal anemia  There is no evidence of fetal hydrops at this time.  She has known fetal hypoplastic left heart syndrome. She is aware of need for  evaluation.       Gestational hypertension  BP Readings from Last 1 Encounters:   24 111/70   Reviewed risks with hypertension in pregnancy, including FGR, abruption and preeclampsia/eclampsia. Continue healthy diet avoiding any excessive weight gain.     She is asymptomatic.    Continue twice weekly follow-up as above  Recommend at least weekly labs, with more frequent labs if worsening blood pressure or clinical condition.    Continue to check BP 2-3 times a day at home  Reviewed instructions to go to hospital immediately if she has decreased fetal movements, headaches, vision problems, or epigastric pain or if blood pressure is over 160 systolic or 105 diastolic.      See delivery plan as above.  Order for repeat labs given for today.      Fetal hypoplastic left heart syndrome  Hypoplastic left heart syndrome (HLHS) is characterized by underdevelopment of the left-sided heart structures with significant hypoplasia of the left ventricle (LV), stenosis or atresia of the mitral and/or aortic valves, and hypoplasia of the ascending aorta and aortic arch.      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     There is an increased risk of genetic abnormalities, particularly aneuploidy or 22q11 deletion, in fetuses with CHD; the frequency depends on the specific lesion. She had amniocentesis that  showed normal microarray and acetylcholine esterase. She is aware of the need for  evaluation.    She understand the need for delivery at tertiary care center in [place]. She has had the appropriate consultations with [place] team. Advised to keep followup as directed.       Left fetal pelvic kidney  Although outcome data are limited, case series suggest that individuals with renal ectopic or fusion anomalies are at risk for long-term renal sequalae compared with those without these anomalies. These include decreased renal function and higher rates of urinary tract obstruction, kidney stones, urogenital cancer, and end-stage kidney disease.     Previously discussed limitations of ultrasound and chromosomal microarray in diagnosing all coexisting anomalies or genetic conditions.  Reviewed need for  evaluation.    Follow up in about 1 week (around 3/7/2024) for BPP and umbilical artery Doppler, MFM follow-up.     Future Appointments   Date Time Provider Department Center   3/5/2024  1:00 PM [facility]   3/5/2024  1:00 PM CV [facility] FETAL ECHO  [facility]   3/10/2024  5:00 PM [facility]CTION, [facility]   2024  9:10 AM Niya Thomas FNP [facility] [place] [facility]      Patient was evaluated and examined by Dr. Pollard. MARY Mccarthy, helped in pre charting of part of note.      This note was created with the assistance of [employer] voice recognition software. There may be transcription errors as a result of using this technology, however minimal. Effort has been made to ensure accuracy of transcription, but any obvious errors or omissions should be clarified with the author of the document.

## 2024-02-29 ENCOUNTER — OFFICE VISIT (OUTPATIENT)
Dept: MATERNAL FETAL MEDICINE | Facility: CLINIC | Age: 24
End: 2024-02-29
Payer: MEDICAID

## 2024-02-29 ENCOUNTER — PROCEDURE VISIT (OUTPATIENT)
Dept: MATERNAL FETAL MEDICINE | Facility: CLINIC | Age: 24
End: 2024-02-29
Payer: MEDICAID

## 2024-02-29 ENCOUNTER — LAB VISIT (OUTPATIENT)
Dept: LAB | Facility: HOSPITAL | Age: 24
End: 2024-02-29
Attending: OBSTETRICS & GYNECOLOGY
Payer: MEDICAID

## 2024-02-29 VITALS
DIASTOLIC BLOOD PRESSURE: 70 MMHG | BODY MASS INDEX: 25.49 KG/M2 | HEART RATE: 122 BPM | WEIGHT: 153 LBS | SYSTOLIC BLOOD PRESSURE: 111 MMHG | HEIGHT: 65 IN

## 2024-02-29 DIAGNOSIS — O28.3 ABNORMAL PRENATAL ULTRASOUND: ICD-10-CM

## 2024-02-29 DIAGNOSIS — O35.BXX0 HYPOPLASTIC LEFT HEART OF FETUS AFFECTING MANAGEMENT OF MOTHER IN SINGLETON PREGNANCY, ANTEPARTUM: ICD-10-CM

## 2024-02-29 DIAGNOSIS — O36.8990 FETAL PERICARDIAL EFFUSION AFFECTING MANAGEMENT OF MOTHER: ICD-10-CM

## 2024-02-29 DIAGNOSIS — O13.3 GESTATIONAL HYPERTENSION WITHOUT SIGNIFICANT PROTEINURIA IN THIRD TRIMESTER: Primary | ICD-10-CM

## 2024-02-29 DIAGNOSIS — O28.3 ABNORMAL FETAL ULTRASOUND: ICD-10-CM

## 2024-02-29 DIAGNOSIS — O36.5930 POOR FETAL GROWTH AFFECTING MANAGEMENT OF MOTHER IN THIRD TRIMESTER, SINGLE OR UNSPECIFIED FETUS: ICD-10-CM

## 2024-02-29 DIAGNOSIS — Z87.59 HISTORY OF ECTOPIC PREGNANCY: ICD-10-CM

## 2024-02-29 DIAGNOSIS — O13.3 GESTATIONAL HYPERTENSION WITHOUT SIGNIFICANT PROTEINURIA IN THIRD TRIMESTER: ICD-10-CM

## 2024-02-29 LAB
ALT SERPL-CCNC: 10 UNIT/L (ref 0–55)
AST SERPL-CCNC: 17 UNIT/L (ref 5–34)
CREAT SERPL-MCNC: 0.81 MG/DL (ref 0.55–1.02)
ERYTHROCYTE [DISTWIDTH] IN BLOOD BY AUTOMATED COUNT: 13 % (ref 11.5–17)
GFR SERPLBLD CREATININE-BSD FMLA CKD-EPI: >60 MLS/MIN/1.73/M2
HCT VFR BLD AUTO: 33.5 % (ref 37–47)
HGB BLD-MCNC: 10.5 G/DL (ref 12–16)
LDH SERPL-CCNC: 199 U/L (ref 125–220)
MCH RBC QN AUTO: 27.9 PG (ref 27–31)
MCHC RBC AUTO-ENTMCNC: 31.3 G/DL (ref 33–36)
MCV RBC AUTO: 88.9 FL (ref 80–94)
NRBC BLD AUTO-RTO: 0 %
PLATELET # BLD AUTO: 249 X10(3)/MCL (ref 130–400)
PMV BLD AUTO: 10.9 FL (ref 7.4–10.4)
RBC # BLD AUTO: 3.77 X10(6)/MCL (ref 4.2–5.4)
URATE SERPL-MCNC: 4 MG/DL (ref 2.6–6)
WBC # SPEC AUTO: 9.7 X10(3)/MCL (ref 4.5–11.5)

## 2024-02-29 PROCEDURE — 76820 UMBILICAL ARTERY ECHO: CPT | Mod: S$GLB,,, | Performed by: OBSTETRICS & GYNECOLOGY

## 2024-02-29 PROCEDURE — 84550 ASSAY OF BLOOD/URIC ACID: CPT

## 2024-02-29 PROCEDURE — 83615 LACTATE (LD) (LDH) ENZYME: CPT

## 2024-02-29 PROCEDURE — 36415 COLL VENOUS BLD VENIPUNCTURE: CPT

## 2024-02-29 PROCEDURE — 85027 COMPLETE CBC AUTOMATED: CPT

## 2024-02-29 PROCEDURE — 3008F BODY MASS INDEX DOCD: CPT | Mod: CPTII,S$GLB,, | Performed by: OBSTETRICS & GYNECOLOGY

## 2024-02-29 PROCEDURE — 84450 TRANSFERASE (AST) (SGOT): CPT

## 2024-02-29 PROCEDURE — 3074F SYST BP LT 130 MM HG: CPT | Mod: CPTII,S$GLB,, | Performed by: OBSTETRICS & GYNECOLOGY

## 2024-02-29 PROCEDURE — 1160F RVW MEDS BY RX/DR IN RCRD: CPT | Mod: CPTII,S$GLB,, | Performed by: OBSTETRICS & GYNECOLOGY

## 2024-02-29 PROCEDURE — 3078F DIAST BP <80 MM HG: CPT | Mod: CPTII,S$GLB,, | Performed by: OBSTETRICS & GYNECOLOGY

## 2024-02-29 PROCEDURE — 82565 ASSAY OF CREATININE: CPT

## 2024-02-29 PROCEDURE — 84460 ALANINE AMINO (ALT) (SGPT): CPT

## 2024-02-29 PROCEDURE — 1159F MED LIST DOCD IN RCRD: CPT | Mod: CPTII,S$GLB,, | Performed by: OBSTETRICS & GYNECOLOGY

## 2024-02-29 PROCEDURE — 76819 FETAL BIOPHYS PROFIL W/O NST: CPT | Mod: S$GLB,,, | Performed by: OBSTETRICS & GYNECOLOGY

## 2024-02-29 PROCEDURE — 99213 OFFICE O/P EST LOW 20 MIN: CPT | Mod: TH,S$GLB,, | Performed by: OBSTETRICS & GYNECOLOGY

## 2024-03-05 ENCOUNTER — OFFICE VISIT (OUTPATIENT)
Dept: PEDIATRIC CARDIOLOGY | Facility: CLINIC | Age: 24
End: 2024-03-05
Payer: MEDICAID

## 2024-03-05 VITALS
WEIGHT: 153.44 LBS | DIASTOLIC BLOOD PRESSURE: 86 MMHG | SYSTOLIC BLOOD PRESSURE: 138 MMHG | BODY MASS INDEX: 25.56 KG/M2 | HEIGHT: 65 IN | HEART RATE: 98 BPM

## 2024-03-05 DIAGNOSIS — O35.BXX0 HYPOPLASTIC LEFT HEART OF FETUS AFFECTING MANAGEMENT OF MOTHER IN SINGLETON PREGNANCY, ANTEPARTUM: Primary | ICD-10-CM

## 2024-03-05 DIAGNOSIS — O13.3 GESTATIONAL HYPERTENSION WITHOUT SIGNIFICANT PROTEINURIA IN THIRD TRIMESTER: Primary | ICD-10-CM

## 2024-03-05 PROCEDURE — 1160F RVW MEDS BY RX/DR IN RCRD: CPT | Mod: CPTII,S$GLB,, | Performed by: PEDIATRICS

## 2024-03-05 PROCEDURE — 3008F BODY MASS INDEX DOCD: CPT | Mod: CPTII,S$GLB,, | Performed by: PEDIATRICS

## 2024-03-05 PROCEDURE — 3075F SYST BP GE 130 - 139MM HG: CPT | Mod: CPTII,S$GLB,, | Performed by: PEDIATRICS

## 2024-03-05 PROCEDURE — 99215 OFFICE O/P EST HI 40 MIN: CPT | Mod: 25,S$GLB,, | Performed by: PEDIATRICS

## 2024-03-05 PROCEDURE — 3079F DIAST BP 80-89 MM HG: CPT | Mod: CPTII,S$GLB,, | Performed by: PEDIATRICS

## 2024-03-05 PROCEDURE — 1159F MED LIST DOCD IN RCRD: CPT | Mod: CPTII,S$GLB,, | Performed by: PEDIATRICS

## 2024-03-07 ENCOUNTER — PROCEDURE VISIT (OUTPATIENT)
Dept: MATERNAL FETAL MEDICINE | Facility: CLINIC | Age: 24
End: 2024-03-07
Payer: MEDICAID

## 2024-03-07 ENCOUNTER — OFFICE VISIT (OUTPATIENT)
Dept: MATERNAL FETAL MEDICINE | Facility: CLINIC | Age: 24
End: 2024-03-07
Payer: MEDICAID

## 2024-03-07 ENCOUNTER — LAB VISIT (OUTPATIENT)
Dept: LAB | Facility: HOSPITAL | Age: 24
End: 2024-03-07
Payer: MEDICAID

## 2024-03-07 VITALS
DIASTOLIC BLOOD PRESSURE: 79 MMHG | HEART RATE: 106 BPM | SYSTOLIC BLOOD PRESSURE: 115 MMHG | HEIGHT: 65 IN | BODY MASS INDEX: 25.49 KG/M2 | WEIGHT: 153 LBS

## 2024-03-07 DIAGNOSIS — O13.3 GESTATIONAL HYPERTENSION WITHOUT SIGNIFICANT PROTEINURIA IN THIRD TRIMESTER: Primary | ICD-10-CM

## 2024-03-07 DIAGNOSIS — Z87.59 HISTORY OF ECTOPIC PREGNANCY: ICD-10-CM

## 2024-03-07 DIAGNOSIS — O13.3 GESTATIONAL HYPERTENSION WITHOUT SIGNIFICANT PROTEINURIA IN THIRD TRIMESTER: ICD-10-CM

## 2024-03-07 DIAGNOSIS — O36.5930 POOR FETAL GROWTH AFFECTING MANAGEMENT OF MOTHER IN THIRD TRIMESTER, SINGLE OR UNSPECIFIED FETUS: ICD-10-CM

## 2024-03-07 DIAGNOSIS — O35.BXX0 HYPOPLASTIC LEFT HEART OF FETUS AFFECTING MANAGEMENT OF MOTHER IN SINGLETON PREGNANCY, ANTEPARTUM: ICD-10-CM

## 2024-03-07 DIAGNOSIS — O28.3 ABNORMAL FETAL ULTRASOUND: ICD-10-CM

## 2024-03-07 DIAGNOSIS — O36.8990 FETAL PERICARDIAL EFFUSION AFFECTING MANAGEMENT OF MOTHER: ICD-10-CM

## 2024-03-07 DIAGNOSIS — O28.3 ABNORMAL PRENATAL ULTRASOUND: ICD-10-CM

## 2024-03-07 LAB
ALT SERPL-CCNC: 11 UNIT/L (ref 0–55)
AST SERPL-CCNC: 16 UNIT/L (ref 5–34)
CREAT SERPL-MCNC: 0.76 MG/DL (ref 0.55–1.02)
ERYTHROCYTE [DISTWIDTH] IN BLOOD BY AUTOMATED COUNT: 13.3 % (ref 11.5–17)
GFR SERPLBLD CREATININE-BSD FMLA CKD-EPI: >60 MLS/MIN/1.73/M2
HCT VFR BLD AUTO: 34.7 % (ref 37–47)
HGB BLD-MCNC: 10.8 G/DL (ref 12–16)
LDH SERPL-CCNC: 200 U/L (ref 125–220)
MCH RBC QN AUTO: 27.8 PG (ref 27–31)
MCHC RBC AUTO-ENTMCNC: 31.1 G/DL (ref 33–36)
MCV RBC AUTO: 89.2 FL (ref 80–94)
NRBC BLD AUTO-RTO: 0 %
PLATELET # BLD AUTO: 243 X10(3)/MCL (ref 130–400)
PMV BLD AUTO: 11.2 FL (ref 7.4–10.4)
RBC # BLD AUTO: 3.89 X10(6)/MCL (ref 4.2–5.4)
URATE SERPL-MCNC: 3.6 MG/DL (ref 2.6–6)
WBC # SPEC AUTO: 9.39 X10(3)/MCL (ref 4.5–11.5)

## 2024-03-07 PROCEDURE — 76819 FETAL BIOPHYS PROFIL W/O NST: CPT | Mod: S$GLB,,, | Performed by: OBSTETRICS & GYNECOLOGY

## 2024-03-07 PROCEDURE — 83615 LACTATE (LD) (LDH) ENZYME: CPT

## 2024-03-07 PROCEDURE — 1160F RVW MEDS BY RX/DR IN RCRD: CPT | Mod: CPTII,S$GLB,, | Performed by: OBSTETRICS & GYNECOLOGY

## 2024-03-07 PROCEDURE — 85027 COMPLETE CBC AUTOMATED: CPT

## 2024-03-07 PROCEDURE — 84460 ALANINE AMINO (ALT) (SGPT): CPT

## 2024-03-07 PROCEDURE — 36415 COLL VENOUS BLD VENIPUNCTURE: CPT

## 2024-03-07 PROCEDURE — 3074F SYST BP LT 130 MM HG: CPT | Mod: CPTII,S$GLB,, | Performed by: OBSTETRICS & GYNECOLOGY

## 2024-03-07 PROCEDURE — 82565 ASSAY OF CREATININE: CPT

## 2024-03-07 PROCEDURE — 3078F DIAST BP <80 MM HG: CPT | Mod: CPTII,S$GLB,, | Performed by: OBSTETRICS & GYNECOLOGY

## 2024-03-07 PROCEDURE — 1159F MED LIST DOCD IN RCRD: CPT | Mod: CPTII,S$GLB,, | Performed by: OBSTETRICS & GYNECOLOGY

## 2024-03-07 PROCEDURE — 84450 TRANSFERASE (AST) (SGOT): CPT

## 2024-03-07 PROCEDURE — 76820 UMBILICAL ARTERY ECHO: CPT | Mod: S$GLB,,, | Performed by: OBSTETRICS & GYNECOLOGY

## 2024-03-07 PROCEDURE — 3008F BODY MASS INDEX DOCD: CPT | Mod: CPTII,S$GLB,, | Performed by: OBSTETRICS & GYNECOLOGY

## 2024-03-07 PROCEDURE — 84550 ASSAY OF BLOOD/URIC ACID: CPT

## 2024-03-07 PROCEDURE — 99213 OFFICE O/P EST LOW 20 MIN: CPT | Mod: TH,S$GLB,, | Performed by: OBSTETRICS & GYNECOLOGY

## 2024-03-07 NOTE — PROGRESS NOTES
Maternal Fetal Medicine Follow Up    Subjective     Patient ID: [id number]    Chief Complaint: high risk pregnancy followup      HPI: Izzy Palacio is a 23 y.o. female  at 37w5d gestation with Estimated Date of Delivery: 3/23/24  who is here for follow  up consultation by [facility].    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 Incidental fetal pericardial effusion seen on 2024 with normal MCA Doppler. 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 Fetal growth restriction with elevated S/D ratio on umbilical artery Doppler noted on 1/10/24 and patient also diagnosed with gestational hypertension on 1/10/2024. She is s/p a course of steroids on 1/10 and 2024. She had an  amniocentesis on 2024 that showed normal microarray and negative acetylcholine esterase. 24 hour urine on 1/10/24 showed protein level unable to calculate.  Most recent preeclampsia labs on 24 showed platelets 249, creatinine 0.81, uric acid 4.0, AST 17, ALT 10, . She was seen at [facility] for consultation on 2024 due to planned delivery in [place]. She also had a congenital Cardiothoracic surgery consult.  The plan is for her to deliver in [place] on 3/10/2024 at 38 weeks.  She has history of a ruptured ectopic pregnancy in 2023 requiring intensive care.         Interval history since last [facility] visit: None.. She denies any leaking fluid, vaginal bleeding, contractions, decreased fetal movement. Denies headaches, visual disturbances, or epigastric pain.    Pregnancy complications include:   Patient Active Problem List   Diagnosis    Poor fetal growth affecting management of mother in third trimester    Elevated S/D ratio on umbilical artery Doppler on 1/10/2024    Hypoplastic left heart of fetus affecting management of mother in corbett pregnancy,  antepartum    Gestational hypertension without significant proteinuria in third trimester    History of ectopic pregnancy    Fetal pelvic kidney, left    Fetal pericardial effusion affecting management of mother        No changes to medical, surgical, family, social, or obstetric history.    Medications:  Current Outpatient Medications   Medication Instructions    oxyCODONE (ROXICODONE) 5 mg, Oral, Every 4 hours PRN    PNV no.153/FA/om3/dha/epa/fish (PRENATAL GUMMIES ORAL) Oral    prenatal vit 55-iron-folic-om3 29-1-430 mg CPKD Oral       Review of Systems   12 point review of systems conducted, negative except as stated in the history of present illness. See HPI for details.      Objective     Visit Vitals  LMP 2023 (Approximate)        Physical Exam  Vitals and nursing note reviewed.   Constitutional:       Appearance: Normal appearance.   HENT:      Head: Normocephalic and atraumatic.      Nose: Nose normal. No congestion.   Cardiovascular:      Rate and Rhythm: Normal rate.   Pulmonary:      Effort: Pulmonary effort is normal.   Skin:     Findings: No rash.   Neurological:      Mental Status: She is alert and oriented to person, place, and time.   Psychiatric:         Mood and Affect: Mood normal.         Behavior: Behavior normal.         Thought Content: Thought content normal.         Judgment: Judgment normal.         ASSESSMENT/PLAN:     23 y.o.  female with IUP at 37w5d    Fetal growth restriction with previous abnormal umbilical artery Doppler, with a hypoplastic left heart syndrome and left pelvic kidney  There is mild fetal growth restriction with continued but slowing fetal growth with an EFW of 2123 g at the 6% and the AC at the 7% on 2024.  AFV is normal with a low-normal RENNY at 7.7. BPP is 8/8.  Umbilical artery Doppler is normal today.  Increase oral hydration advice will continue fetal kick counts.  Patient is to go in in 3 days in the hospital for admission for induction of  labor.    She had amniocentesis that showed normal microarray and acetylcholine esterase. She already did quad screen that was negative. She is aware of need for  evaluation.    Potential etiologies of FGR include but are not limited to normal variation of stature, placental insufficiency, chromosomal abnormalities, genetic disorders, infections, medical conditions, teratogen exposure and other etiologies.      Complications of growth-restricted neonates include hypoglycemia, hyperbilirubinemia, hypothermia, seizures, sepsis, IVH, RDS, necrotizing enterocolitis, and  asphyxia. Long-term complications include cognitive delay and adult diseases such as cardiovascular disorders and type 2 diabetes. There is an increased risk (~20%) for recurrence of fetal growth restriction in a future pregnancy.      Reviewed that fetal testing is not a 100% protective against the risk of fetal demise in-utero. Reviewed the importance of doing fetal kick counts three times a day and resting in a lateral recumbent position and reporting immediately at any time there is any decreased fetal movement even if the same day of testing.    She has delivery scheduled for 3/10/2024.  Patient understands the risks and is in agreement with plan of care.      Fetal pericardial effusion  With aneuploidy being in differential, patient had amniocentesis that showed normal microarray and acetylcholine esterase.     With fetal anemia being a differential for pericardial effusion, a MCA doppler was done on 2024 as a screening for fetal anemia. Normal MCA doppler with PSV at 1.30 MoM with no evidence of and low suspicion for fetal anemia  There is no evidence of fetal hydrops at this time.  She has known fetal hypoplastic left heart syndrome. She is aware of need for  evaluation.       Gestational hypertension  BP Readings from Last 1 Encounters:   24 138/86   Reviewed risks with hypertension in pregnancy, including  FGR, abruption and preeclampsia/eclampsia. Continue healthy diet avoiding any excessive weight gain.     She is asymptomatic.    Recommend at least weekly labs, with more frequent labs if worsening blood pressure or clinical condition.  Continue to check BP 2-3 times a day at home    Reviewed instructions to go to hospital immediately if she has decreased fetal movements, headaches, vision problems, or epigastric pain or if blood pressure is over 160 systolic or 105 diastolic.      See delivery plan as above.  Order for repeat labs given for today.      Fetal hypoplastic left heart syndrome  Hypoplastic left heart syndrome (HLHS) is characterized by underdevelopment of the left-sided heart structures with significant hypoplasia of the left ventricle (LV), stenosis or atresia of the mitral and/or aortic valves, and hypoplasia of the ascending aorta and aortic arch.      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     There is an increased risk of genetic abnormalities, particularly aneuploidy or 22q11 deletion, in fetuses with CHD; the frequency depends on the specific lesion. She had amniocentesis that showed normal microarray and acetylcholine esterase. She is aware of the need for  evaluation.    She understand the need for delivery at tertiary care center in [place]. She has had the appropriate consultations with [place] team. Advised to keep followup as directed.       Left fetal pelvic kidney  Although outcome data are limited, case series suggest that individuals with renal ectopic or fusion anomalies are at risk for long-term renal sequalae compared with those without these anomalies. These include decreased renal function and higher rates of urinary tract obstruction, kidney stones, urogenital cancer, and end-stage kidney disease.     Previously discussed  limitations of ultrasound and chromosomal microarray in diagnosing all coexisting anomalies or genetic conditions.  Reviewed need for  evaluation.    No follow-ups on file.     Future Appointments   Date Time Provider Department Center   3/7/2024  2:30 PM Samuel Pollard MD [facility]   3/7/2024  2:30 PM ROOM 1 AND 2, [facility]   3/10/2024  5:00 PM INDUCTION, [facility]   2024  9:10 AM Niya Thomas FNP [facility] [place] [facility]      Patient was evaluated and examined by Dr. Pollard. LINDA Montano, helped in pre charting of part of note.     This note was created with the assistance of [employer] / [employer] voice recognition software. There may be transcription errors as a result of using this technology, however minimal. Effort has been made to ensure accuracy of transcription, but any obvious errors or omissions should be clarified with the author of the document.

## 2024-03-08 ENCOUNTER — TELEPHONE (OUTPATIENT)
Dept: MATERNAL FETAL MEDICINE | Facility: CLINIC | Age: 24
End: 2024-03-08
Payer: MEDICAID

## 2024-03-08 NOTE — TELEPHONE ENCOUNTER
----- Message from Anna Asif PA-C sent at 3/7/2024  4:37 PM CST -----  Please notify patient of below:    1. Preeclampsia labs were normal.  Good luck with the delivery    Pt notified of results

## 2024-03-10 ENCOUNTER — ANESTHESIA EVENT (OUTPATIENT)
Dept: OBSTETRICS AND GYNECOLOGY | Facility: OTHER | Age: 24
End: 2024-03-10
Payer: MEDICAID

## 2024-03-10 ENCOUNTER — ANESTHESIA (OUTPATIENT)
Dept: OBSTETRICS AND GYNECOLOGY | Facility: OTHER | Age: 24
End: 2024-03-10
Payer: MEDICAID

## 2024-03-10 ENCOUNTER — PATIENT MESSAGE (OUTPATIENT)
Dept: OBSTETRICS AND GYNECOLOGY | Facility: OTHER | Age: 24
End: 2024-03-10
Payer: MEDICAID

## 2024-03-10 ENCOUNTER — HOSPITAL ENCOUNTER (INPATIENT)
Facility: OTHER | Age: 24
LOS: 2 days | Discharge: HOME | End: 2024-03-12
Attending: STUDENT IN AN ORGANIZED HEALTH CARE EDUCATION/TRAINING PROGRAM | Admitting: STUDENT IN AN ORGANIZED HEALTH CARE EDUCATION/TRAINING PROGRAM
Payer: MEDICAID

## 2024-03-10 DIAGNOSIS — O35.BXX0 HYPOPLASTIC LEFT HEART OF FETUS AFFECTING MANAGEMENT OF MOTHER IN SINGLETON PREGNANCY, ANTEPARTUM: ICD-10-CM

## 2024-03-10 DIAGNOSIS — D50.8 IRON DEFICIENCY ANEMIA SECONDARY TO INADEQUATE DIETARY IRON INTAKE: ICD-10-CM

## 2024-03-10 DIAGNOSIS — O36.5930 POOR FETAL GROWTH AFFECTING MANAGEMENT OF MOTHER IN THIRD TRIMESTER, SINGLE OR UNSPECIFIED FETUS: ICD-10-CM

## 2024-03-10 DIAGNOSIS — Z34.90 ENCOUNTER FOR INDUCTION OF LABOR: ICD-10-CM

## 2024-03-10 LAB
ABO + RH BLD: NORMAL
ALBUMIN SERPL BCP-MCNC: 2.9 G/DL (ref 3.5–5.2)
ALP SERPL-CCNC: 363 U/L (ref 55–135)
ALT SERPL W/O P-5'-P-CCNC: 13 U/L (ref 10–44)
ANION GAP SERPL CALC-SCNC: 8 MMOL/L (ref 8–16)
AST SERPL-CCNC: 19 U/L (ref 10–40)
BASOPHILS # BLD AUTO: 0.02 K/UL (ref 0–0.2)
BASOPHILS NFR BLD: 0.3 % (ref 0–1.9)
BILIRUB SERPL-MCNC: 0.4 MG/DL (ref 0.1–1)
BLD GP AB SCN CELLS X3 SERPL QL: NORMAL
BUN SERPL-MCNC: 8 MG/DL (ref 6–20)
CALCIUM SERPL-MCNC: 9.1 MG/DL (ref 8.7–10.5)
CHLORIDE SERPL-SCNC: 110 MMOL/L (ref 95–110)
CO2 SERPL-SCNC: 19 MMOL/L (ref 23–29)
CREAT SERPL-MCNC: 0.7 MG/DL (ref 0.5–1.4)
CREAT UR-MCNC: 58.8 MG/DL (ref 15–325)
DIFFERENTIAL METHOD BLD: ABNORMAL
EOSINOPHIL # BLD AUTO: 0.1 K/UL (ref 0–0.5)
EOSINOPHIL NFR BLD: 1.7 % (ref 0–8)
ERYTHROCYTE [DISTWIDTH] IN BLOOD BY AUTOMATED COUNT: 13.3 % (ref 11.5–14.5)
EST. GFR  (NO RACE VARIABLE): >60 ML/MIN/1.73 M^2
GLUCOSE SERPL-MCNC: 78 MG/DL (ref 70–110)
HCT VFR BLD AUTO: 32.9 % (ref 37–48.5)
HGB BLD-MCNC: 10.7 G/DL (ref 12–16)
IMM GRANULOCYTES # BLD AUTO: 0.03 K/UL (ref 0–0.04)
IMM GRANULOCYTES NFR BLD AUTO: 0.4 % (ref 0–0.5)
LYMPHOCYTES # BLD AUTO: 2 K/UL (ref 1–4.8)
LYMPHOCYTES NFR BLD: 25.9 % (ref 18–48)
MCH RBC QN AUTO: 27.4 PG (ref 27–31)
MCHC RBC AUTO-ENTMCNC: 32.5 G/DL (ref 32–36)
MCV RBC AUTO: 84 FL (ref 82–98)
MONOCYTES # BLD AUTO: 0.8 K/UL (ref 0.3–1)
MONOCYTES NFR BLD: 10 % (ref 4–15)
NEUTROPHILS # BLD AUTO: 4.7 K/UL (ref 1.8–7.7)
NEUTROPHILS NFR BLD: 61.7 % (ref 38–73)
NRBC BLD-RTO: 0 /100 WBC
PLATELET # BLD AUTO: 271 K/UL (ref 150–450)
PMV BLD AUTO: 11.4 FL (ref 9.2–12.9)
POTASSIUM SERPL-SCNC: 4 MMOL/L (ref 3.5–5.1)
PROT SERPL-MCNC: 6.5 G/DL (ref 6–8.4)
PROT UR-MCNC: <7 MG/DL (ref 0–15)
PROT/CREAT UR: NORMAL MG/G{CREAT} (ref 0–0.2)
RBC # BLD AUTO: 3.9 M/UL (ref 4–5.4)
SODIUM SERPL-SCNC: 137 MMOL/L (ref 136–145)
SPECIMEN OUTDATE: NORMAL
WBC # BLD AUTO: 7.53 K/UL (ref 3.9–12.7)

## 2024-03-10 PROCEDURE — C1751 CATH, INF, PER/CENT/MIDLINE: HCPCS | Performed by: ANESTHESIOLOGY

## 2024-03-10 PROCEDURE — 27200710 HC EPIDURAL INFUSION PUMP SET: Performed by: ANESTHESIOLOGY

## 2024-03-10 PROCEDURE — 80053 COMPREHEN METABOLIC PANEL: CPT | Performed by: STUDENT IN AN ORGANIZED HEALTH CARE EDUCATION/TRAINING PROGRAM

## 2024-03-10 PROCEDURE — 59409 OBSTETRICAL CARE: CPT | Mod: AA,,, | Performed by: ANESTHESIOLOGY

## 2024-03-10 PROCEDURE — 85025 COMPLETE CBC W/AUTO DIFF WBC: CPT | Performed by: STUDENT IN AN ORGANIZED HEALTH CARE EDUCATION/TRAINING PROGRAM

## 2024-03-10 PROCEDURE — 25000003 PHARM REV CODE 250: Performed by: STUDENT IN AN ORGANIZED HEALTH CARE EDUCATION/TRAINING PROGRAM

## 2024-03-10 PROCEDURE — 11000001 HC ACUTE MED/SURG PRIVATE ROOM

## 2024-03-10 PROCEDURE — 62326 NJX INTERLAMINAR LMBR/SAC: CPT | Performed by: STUDENT IN AN ORGANIZED HEALTH CARE EDUCATION/TRAINING PROGRAM

## 2024-03-10 PROCEDURE — 59200 INSERT CERVICAL DILATOR: CPT

## 2024-03-10 PROCEDURE — 84156 ASSAY OF PROTEIN URINE: CPT | Performed by: STUDENT IN AN ORGANIZED HEALTH CARE EDUCATION/TRAINING PROGRAM

## 2024-03-10 PROCEDURE — 86850 RBC ANTIBODY SCREEN: CPT | Performed by: STUDENT IN AN ORGANIZED HEALTH CARE EDUCATION/TRAINING PROGRAM

## 2024-03-10 PROCEDURE — 63600175 PHARM REV CODE 636 W HCPCS: Performed by: STUDENT IN AN ORGANIZED HEALTH CARE EDUCATION/TRAINING PROGRAM

## 2024-03-10 RX ORDER — SODIUM CHLORIDE 9 MG/ML
INJECTION, SOLUTION INTRAVENOUS
Status: DISCONTINUED | OUTPATIENT
Start: 2024-03-10 | End: 2024-03-11

## 2024-03-10 RX ORDER — LIDOCAINE HYDROCHLORIDE 10 MG/ML
10 INJECTION INFILTRATION; PERINEURAL ONCE AS NEEDED
Status: DISCONTINUED | OUTPATIENT
Start: 2024-03-10 | End: 2024-03-11

## 2024-03-10 RX ORDER — CALCIUM CARBONATE 200(500)MG
500 TABLET,CHEWABLE ORAL 3 TIMES DAILY PRN
Status: DISCONTINUED | OUTPATIENT
Start: 2024-03-10 | End: 2024-03-11

## 2024-03-10 RX ORDER — SIMETHICONE 80 MG
1 TABLET,CHEWABLE ORAL 4 TIMES DAILY PRN
Status: DISCONTINUED | OUTPATIENT
Start: 2024-03-10 | End: 2024-03-11

## 2024-03-10 RX ORDER — SODIUM CHLORIDE, SODIUM LACTATE, POTASSIUM CHLORIDE, CALCIUM CHLORIDE 600; 310; 30; 20 MG/100ML; MG/100ML; MG/100ML; MG/100ML
INJECTION, SOLUTION INTRAVENOUS CONTINUOUS
Status: DISCONTINUED | OUTPATIENT
Start: 2024-03-10 | End: 2024-03-11

## 2024-03-10 RX ORDER — OXYTOCIN 10 [USP'U]/ML
10 INJECTION, SOLUTION INTRAMUSCULAR; INTRAVENOUS ONCE AS NEEDED
Status: DISCONTINUED | OUTPATIENT
Start: 2024-03-10 | End: 2024-03-11

## 2024-03-10 RX ORDER — CARBOPROST TROMETHAMINE 250 UG/ML
250 INJECTION, SOLUTION INTRAMUSCULAR
Status: DISCONTINUED | OUTPATIENT
Start: 2024-03-10 | End: 2024-03-11

## 2024-03-10 RX ORDER — OXYTOCIN/RINGER'S LACTATE 30/500 ML
95 PLASTIC BAG, INJECTION (ML) INTRAVENOUS ONCE
Status: DISCONTINUED | OUTPATIENT
Start: 2024-03-10 | End: 2024-03-11

## 2024-03-10 RX ORDER — METHYLERGONOVINE MALEATE 0.2 MG/ML
200 INJECTION INTRAVENOUS ONCE AS NEEDED
Status: DISCONTINUED | OUTPATIENT
Start: 2024-03-10 | End: 2024-03-11

## 2024-03-10 RX ORDER — LIDOCAINE HYDROCHLORIDE AND EPINEPHRINE 15; 5 MG/ML; UG/ML
INJECTION, SOLUTION EPIDURAL
Status: DISCONTINUED | OUTPATIENT
Start: 2024-03-10 | End: 2024-03-11

## 2024-03-10 RX ORDER — OXYTOCIN/RINGER'S LACTATE 30/500 ML
334 PLASTIC BAG, INJECTION (ML) INTRAVENOUS ONCE
Status: DISCONTINUED | OUTPATIENT
Start: 2024-03-10 | End: 2024-03-11

## 2024-03-10 RX ORDER — DIPHENOXYLATE HYDROCHLORIDE AND ATROPINE SULFATE 2.5; .025 MG/1; MG/1
2 TABLET ORAL EVERY 6 HOURS PRN
Status: DISCONTINUED | OUTPATIENT
Start: 2024-03-10 | End: 2024-03-11

## 2024-03-10 RX ORDER — OXYTOCIN/RINGER'S LACTATE 30/500 ML
334 PLASTIC BAG, INJECTION (ML) INTRAVENOUS ONCE AS NEEDED
Status: DISCONTINUED | OUTPATIENT
Start: 2024-03-10 | End: 2024-03-11

## 2024-03-10 RX ORDER — TRANEXAMIC ACID 10 MG/ML
1000 INJECTION, SOLUTION INTRAVENOUS EVERY 30 MIN PRN
Status: DISCONTINUED | OUTPATIENT
Start: 2024-03-10 | End: 2024-03-11

## 2024-03-10 RX ORDER — ONDANSETRON 8 MG/1
8 TABLET, ORALLY DISINTEGRATING ORAL EVERY 8 HOURS PRN
Status: DISCONTINUED | OUTPATIENT
Start: 2024-03-10 | End: 2024-03-11

## 2024-03-10 RX ORDER — MISOPROSTOL 200 UG/1
800 TABLET ORAL ONCE AS NEEDED
Status: COMPLETED | OUTPATIENT
Start: 2024-03-10 | End: 2024-03-11

## 2024-03-10 RX ORDER — OXYTOCIN/RINGER'S LACTATE 30/500 ML
95 PLASTIC BAG, INJECTION (ML) INTRAVENOUS ONCE AS NEEDED
Status: DISCONTINUED | OUTPATIENT
Start: 2024-03-10 | End: 2024-03-11

## 2024-03-10 RX ORDER — FENTANYL/BUPIVACAINE/NS/PF 2MCG/ML-.1
PLASTIC BAG, INJECTION (ML) INJECTION
Status: COMPLETED
Start: 2024-03-10 | End: 2024-03-10

## 2024-03-10 RX ORDER — OXYTOCIN/RINGER'S LACTATE 30/500 ML
0-32 PLASTIC BAG, INJECTION (ML) INTRAVENOUS CONTINUOUS
Status: DISCONTINUED | OUTPATIENT
Start: 2024-03-10 | End: 2024-03-11

## 2024-03-10 RX ORDER — MISOPROSTOL 200 UG/1
800 TABLET ORAL ONCE AS NEEDED
Status: DISCONTINUED | OUTPATIENT
Start: 2024-03-10 | End: 2024-03-11

## 2024-03-10 RX ORDER — FENTANYL/BUPIVACAINE/NS/PF 2MCG/ML-.1
PLASTIC BAG, INJECTION (ML) INJECTION
Status: DISCONTINUED | OUTPATIENT
Start: 2024-03-10 | End: 2024-03-11

## 2024-03-10 RX ADMIN — DEXTROSE MONOHYDRATE 5 MILLION UNITS: 5 INJECTION INTRAVENOUS at 08:03

## 2024-03-10 RX ADMIN — Medication 5 ML: at 10:03

## 2024-03-10 RX ADMIN — Medication 2 MILLI-UNITS/MIN: at 08:03

## 2024-03-10 RX ADMIN — LIDOCAINE HYDROCHLORIDE,EPINEPHRINE BITARTRATE 3 ML: 15; .005 INJECTION, SOLUTION EPIDURAL; INFILTRATION; INTRACAUDAL; PERINEURAL at 10:03

## 2024-03-10 RX ADMIN — Medication 8 ML/HR: at 10:03

## 2024-03-10 NOTE — ANESTHESIA PREPROCEDURE EVALUATION
[facility]  Anesthesia Pre-Operative Evaluation         Patient Name: Izzy Palacio  YOB: 2000  MRN: [id number]    03/10/2024      Izzy Palacio is a 23 y.o. female  @ 38w1d who presents for scheduled IOL. IUP complicated by gHTN, fetal hypoplastic left heart and fetal left pelvic kidney.     She otherwise has no noted bleeding/clotting disorders, significant cardiopulmonary disease, or spinal pathology.    OB History    Para Term  AB Living   2 0     1     SAB IAB Ectopic Multiple Live Births       1          # Outcome Date GA Lbr Juan Antonio/2nd Weight Sex Delivery Anes PTL Lv   2 Current            1 Ectopic 2023     ECTOPIC          Review of patient's allergies indicates:  No Known Allergies    Wt Readings from Last 1 Encounters:   24 1447 69.4 kg (153 lb)       BP Readings from Last 3 Encounters:   24 115/79   24 138/86   24 111/70       Patient Active Problem List   Diagnosis    Poor fetal growth affecting management of mother in third trimester    Elevated S/D ratio on umbilical artery Doppler on 1/10/2024    Hypoplastic left heart of fetus affecting management of mother in corbett pregnancy, antepartum    Gestational hypertension without significant proteinuria in third trimester    History of ectopic pregnancy    Fetal pelvic kidney, left    Fetal pericardial effusion affecting management of mother    Encounter for induction of labor       Past Surgical History:   Procedure Laterality Date    LAPAROTOMY, EXPLORATORY N/A 2023    Procedure: Ex Laparatomy, Right Salpingectomy, Evacuation of Hemoperitoneum;  Surgeon: Shayy Barksdale MD;  Location: [facility];  Service: OB/GYN;  Laterality: N/A;    SALPINGECTOMY N/A 2023    Procedure: SALPINGECTOMY;  Surgeon: Shayy Barksdale MD;  Location: [facility];  Service: OB/GYN;  Laterality: N/A;       Social History     Socioeconomic History    Marital status: Single   Tobacco Use  "   Smoking status: Never     Passive exposure: Never    Smokeless tobacco: Never   Substance and Sexual Activity    Alcohol use: Not Currently    Drug use: Never    Sexual activity: Not Currently     Partners: Male         Chemistry        Component Value Date/Time     01/10/2024 1338    K 3.9 01/10/2024 1338    CO2 21 (L) 01/10/2024 1338    BUN 6.9 (L) 01/10/2024 1338    CREATININE 0.76 03/07/2024 1558        Component Value Date/Time    CALCIUM 8.9 01/10/2024 1338    ALKPHOS 146 01/10/2024 1338    AST 16 03/07/2024 1558    ALT 11 03/07/2024 1558    BILITOT 0.3 01/10/2024 1338            Lab Results   Component Value Date    WBC 9.39 03/07/2024    HGB 10.8 (L) 03/07/2024    HCT 34.7 (L) 03/07/2024    MCV 89.2 03/07/2024     03/07/2024       No results for input(s): "PT", "INR", "PROTIME", "APTT" in the last 72 hours.          Pre-op Assessment    I have reviewed the Patient Summary Reports.     I have reviewed the Nursing Notes. I have reviewed the NPO Status.      Review of Systems  Anesthesia Hx:               Denies Personal Hx of Anesthesia complications.                    Hematology/Oncology:  Hematology Normal   Oncology Normal                                   Cardiovascular:  Cardiovascular Normal                                            Pulmonary:  Pulmonary Normal                       Renal/:  Renal/ Normal                 Musculoskeletal:  Musculoskeletal Normal                OB/GYN/PEDS:      Issues with Current Pregnancy  are Hypertensive Disease              Neurological:  Neurology Normal                                      Endocrine:  Endocrine Normal                Physical Exam  General: Alert and Oriented    Airway:  Mallampati: II   Mouth Opening: Normal  TM Distance: Normal  Tongue: Normal  Neck ROM: Normal ROM        Anesthesia Plan  Type of Anesthesia, risks & benefits discussed:    Anesthesia Type: Epidural, Spinal, CSE  Intra-op Monitoring Plan: Standard ASA " Monitors  Post Op Pain Control Plan: epidural analgesia, intrathecal opioid and multimodal analgesia  Informed Consent: Informed consent signed with the Patient and all parties understand the risks and agree with anesthesia plan.  All questions answered.   ASA Score: 3  Day of Surgery Review of History & Physical: H&P Update referred to the surgeon/provider.    Ready For Surgery From Anesthesia Perspective.     .

## 2024-03-10 NOTE — H&P
HISTORY AND PHYSICAL                                                OBSTETRICS          Subjective:       Izzy Palacio is a 23 y.o.  female with IUP at 38w1d weeks gestation who presents with FGR and fetal cardiac anomalies (HLHS) presenting for IOL.    Patient denies contractions, denies vaginal bleeding, denies LOF.   Fetal Movement: normal.    This IUP is complicated by HLHS/FGR, anemia, gHTN, h/o ex-lap (2023), GBS+, abnormal FTA/negative RPR.    Review of Systems   Constitutional:  Negative for chills and fever.   Respiratory:  Negative for cough and shortness of breath.    Cardiovascular:  Negative for chest pain.   Gastrointestinal:  Negative for abdominal pain, nausea and vomiting.   Endocrine: Negative for diabetes.   Genitourinary:  Negative for vaginal bleeding.   Musculoskeletal:  Negative for back pain.   Neurological:  Negative for headaches.   Psychiatric/Behavioral:  Negative for depression.        PMHx:   Past Medical History:   Diagnosis Date    Anemia     NOT SURE IF CURRENT OR NOT       PSHx:   Past Surgical History:   Procedure Laterality Date    LAPAROTOMY, EXPLORATORY N/A 2023    Procedure: Ex Laparatomy, Right Salpingectomy, Evacuation of Hemoperitoneum;  Surgeon: Shayy Barksdale MD;  Location: [facility];  Service: OB/GYN;  Laterality: N/A;    SALPINGECTOMY N/A 2023    Procedure: SALPINGECTOMY;  Surgeon: Shayy Barksdale MD;  Location: [facility];  Service: OB/GYN;  Laterality: N/A;       All: Review of patient's allergies indicates:  No Known Allergies    Meds:   Medications Prior to Admission   Medication Sig Dispense Refill Last Dose    oxyCODONE (ROXICODONE) 5 MG immediate release tablet Take 1 tablet (5 mg total) by mouth every 4 (four) hours as needed for Pain. (Patient not taking: Reported on 3/7/2024) 15 tablet 0     PNV no.153/FA/om3/dha/epa/fish (PRENATAL GUMMIES ORAL) Take by mouth.       prenatal vit 55-iron-folic-om3 29-1-430 mg CPKD Take by mouth.           SH:   Social History     Socioeconomic History    Marital status: Single   Tobacco Use    Smoking status: Never     Passive exposure: Never    Smokeless tobacco: Never   Substance and Sexual Activity    Alcohol use: Not Currently    Drug use: Never    Sexual activity: Not Currently     Partners: Male       FH:   Family History   Problem Relation Age of Onset    Diabetes Paternal Grandmother     Stroke Maternal Grandmother     Hypertension Maternal Grandmother     Diabetes Maternal Grandfather        OBHx:   OB History    Para Term  AB Living   2 0 0 0 1 0   SAB IAB Ectopic Multiple Live Births   0 0 1 0 0      # Outcome Date GA Lbr Juan Antonio/2nd Weight Sex Delivery Anes PTL Lv   2 Current            1 Ectopic 2023     ECTOPIC          Objective:       LMP 2023 (Approximate)     There were no vitals filed for this visit.    General:   alert, appears stated age and cooperative, no apparent distress   HENT:  normocephalic, atraumatic   Eyes:  extraocular movements and conjunctivae normal   Neck:  supple, range of motion normal, no thyromegaly   Lungs:   no respiratory distress   Heart:   regular rate   Abdomen:  soft, non-tender, non-distended but gravid, no rebound or guarding    Extremities negative edema, negative erythema   FHT: 150, moderate BTBV, +accels, -decels;  Cat 1 (reassuring)                 TOCO: Q 6-7 minutes   Presentations: cephalic by ultrasound   Cervix:     Dilation: 1.5 cm    Effacement: 70%    Station:  -3    Consistency: soft    Position: posterior       Recent Growth Scan: EFW 2123g, 7%ile, AC 6%ile @ 35w3d    Lab Review  Blood Type O POS  GBBS: positive   Rubella: Immune  RPR: NR (positive FTA)  HIV: negative  HepB: negative       Assessment:       38w1d weeks gestation with FGR/HLHS presenting for IOL.     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          Plan:      1. IOL   - Risks, benefits, alternatives and possible complications have been discussed in detail with the patient.   - Consents signed and to chart  - Admit to Labor and Delivery unit  - LD pitocin/CRB for induction    - Epidural per Anesthesia  - Draw CBC, T&S  - Notify Staff  - Recheck in 4 hrs or PRN  - EFW 6 lbs by [place]     Post-Partum Hemorrhage risk - medium    Fetal Cardiac anomalies/FGR  - Fetal HLHS, followed by peds cards  - NICU to attend delivery   - FGR: EFW 2123g 7%ile, AC 6%ile at 35w3d  - most recent UAD normal, h/o elevated SD ratio at 33 wga with normal reads thereafter   - s/p BMZ course 1/10-1/11     GBS positive   - PCN intrapartum     Anemia   - H/H pending on arrival   - start iron supplement if H/H <10/30 postpartum     gHTN   - BP normotensive on admission, monitor closely   - CMP, PC pending       Abnormal FTA   - Positive FTA x 2, negative RPR   - no further evaluation done this pregnancy     History of Ex-lap  - H/o Ex-lap/R salpingectomy for ruptured ectopic pregnancy with hemoperitoneum         Talita Barragan MD   PGY-4, OB-GYN

## 2024-03-11 PROBLEM — Z34.90 ENCOUNTER FOR INDUCTION OF LABOR: Status: RESOLVED | Noted: 2024-03-10 | Resolved: 2024-03-11

## 2024-03-11 PROCEDURE — 25000003 PHARM REV CODE 250: Performed by: STUDENT IN AN ORGANIZED HEALTH CARE EDUCATION/TRAINING PROGRAM

## 2024-03-11 PROCEDURE — 63600175 PHARM REV CODE 636 W HCPCS: Performed by: STUDENT IN AN ORGANIZED HEALTH CARE EDUCATION/TRAINING PROGRAM

## 2024-03-11 PROCEDURE — 72200006 HC VAGINAL DELIVERY LEVEL III

## 2024-03-11 PROCEDURE — 0UQMXZZ REPAIR VULVA, EXTERNAL APPROACH: ICD-10-PCS | Performed by: STUDENT IN AN ORGANIZED HEALTH CARE EDUCATION/TRAINING PROGRAM

## 2024-03-11 PROCEDURE — 72100002 HC LABOR CARE, 1ST 8 HOURS

## 2024-03-11 PROCEDURE — 10907ZC DRAINAGE OF AMNIOTIC FLUID, THERAPEUTIC FROM PRODUCTS OF CONCEPTION, VIA NATURAL OR ARTIFICIAL OPENING: ICD-10-PCS | Performed by: STUDENT IN AN ORGANIZED HEALTH CARE EDUCATION/TRAINING PROGRAM

## 2024-03-11 PROCEDURE — 51702 INSERT TEMP BLADDER CATH: CPT

## 2024-03-11 PROCEDURE — 59409 OBSTETRICAL CARE: CPT | Mod: AT,,, | Performed by: STUDENT IN AN ORGANIZED HEALTH CARE EDUCATION/TRAINING PROGRAM

## 2024-03-11 PROCEDURE — 72100003 HC LABOR CARE, EA. ADDL. 8 HRS

## 2024-03-11 PROCEDURE — 25000003 PHARM REV CODE 250

## 2024-03-11 PROCEDURE — 11000001 HC ACUTE MED/SURG PRIVATE ROOM

## 2024-03-11 RX ORDER — OXYTOCIN/RINGER'S LACTATE 30/500 ML
95 PLASTIC BAG, INJECTION (ML) INTRAVENOUS ONCE
Status: DISCONTINUED | OUTPATIENT
Start: 2024-03-11 | End: 2024-03-12 | Stop reason: HOSPADM

## 2024-03-11 RX ORDER — DIPHENHYDRAMINE HYDROCHLORIDE 50 MG/ML
25 INJECTION INTRAMUSCULAR; INTRAVENOUS EVERY 4 HOURS PRN
Status: DISCONTINUED | OUTPATIENT
Start: 2024-03-11 | End: 2024-03-12 | Stop reason: HOSPADM

## 2024-03-11 RX ORDER — MISOPROSTOL 200 UG/1
800 TABLET ORAL ONCE AS NEEDED
Status: DISCONTINUED | OUTPATIENT
Start: 2024-03-11 | End: 2024-03-12 | Stop reason: HOSPADM

## 2024-03-11 RX ORDER — SODIUM CITRATE AND CITRIC ACID MONOHYDRATE 334; 500 MG/5ML; MG/5ML
30 SOLUTION ORAL ONCE
Status: DISCONTINUED | OUTPATIENT
Start: 2024-03-11 | End: 2024-03-11

## 2024-03-11 RX ORDER — HYDROCODONE BITARTRATE AND ACETAMINOPHEN 10; 325 MG/1; MG/1
1 TABLET ORAL EVERY 4 HOURS PRN
Status: DISCONTINUED | OUTPATIENT
Start: 2024-03-11 | End: 2024-03-12 | Stop reason: HOSPADM

## 2024-03-11 RX ORDER — FAMOTIDINE 10 MG/ML
20 INJECTION INTRAVENOUS ONCE
Status: DISCONTINUED | OUTPATIENT
Start: 2024-03-11 | End: 2024-03-11

## 2024-03-11 RX ORDER — DIPHENOXYLATE HYDROCHLORIDE AND ATROPINE SULFATE 2.5; .025 MG/1; MG/1
2 TABLET ORAL EVERY 6 HOURS PRN
Status: DISCONTINUED | OUTPATIENT
Start: 2024-03-11 | End: 2024-03-12 | Stop reason: HOSPADM

## 2024-03-11 RX ORDER — DOCUSATE SODIUM 100 MG/1
200 CAPSULE, LIQUID FILLED ORAL 2 TIMES DAILY PRN
Status: DISCONTINUED | OUTPATIENT
Start: 2024-03-11 | End: 2024-03-12 | Stop reason: HOSPADM

## 2024-03-11 RX ORDER — OXYTOCIN 10 [USP'U]/ML
10 INJECTION, SOLUTION INTRAMUSCULAR; INTRAVENOUS ONCE AS NEEDED
Status: DISCONTINUED | OUTPATIENT
Start: 2024-03-11 | End: 2024-03-12 | Stop reason: HOSPADM

## 2024-03-11 RX ORDER — METHYLERGONOVINE MALEATE 0.2 MG/ML
200 INJECTION INTRAVENOUS ONCE AS NEEDED
Status: DISCONTINUED | OUTPATIENT
Start: 2024-03-11 | End: 2024-03-12 | Stop reason: HOSPADM

## 2024-03-11 RX ORDER — CARBOPROST TROMETHAMINE 250 UG/ML
250 INJECTION, SOLUTION INTRAMUSCULAR
Status: DISCONTINUED | OUTPATIENT
Start: 2024-03-11 | End: 2024-03-12 | Stop reason: HOSPADM

## 2024-03-11 RX ORDER — ONDANSETRON 8 MG/1
8 TABLET, ORALLY DISINTEGRATING ORAL EVERY 8 HOURS PRN
Status: DISCONTINUED | OUTPATIENT
Start: 2024-03-11 | End: 2024-03-12 | Stop reason: HOSPADM

## 2024-03-11 RX ORDER — OXYTOCIN/RINGER'S LACTATE 30/500 ML
95 PLASTIC BAG, INJECTION (ML) INTRAVENOUS ONCE AS NEEDED
Status: DISCONTINUED | OUTPATIENT
Start: 2024-03-11 | End: 2024-03-12 | Stop reason: HOSPADM

## 2024-03-11 RX ORDER — SIMETHICONE 80 MG
1 TABLET,CHEWABLE ORAL EVERY 6 HOURS PRN
Status: DISCONTINUED | OUTPATIENT
Start: 2024-03-11 | End: 2024-03-12 | Stop reason: HOSPADM

## 2024-03-11 RX ORDER — HYDROCODONE BITARTRATE AND ACETAMINOPHEN 5; 325 MG/1; MG/1
1 TABLET ORAL EVERY 4 HOURS PRN
Status: DISCONTINUED | OUTPATIENT
Start: 2024-03-11 | End: 2024-03-12 | Stop reason: HOSPADM

## 2024-03-11 RX ORDER — DIPHENHYDRAMINE HCL 25 MG
25 CAPSULE ORAL EVERY 4 HOURS PRN
Status: DISCONTINUED | OUTPATIENT
Start: 2024-03-11 | End: 2024-03-12 | Stop reason: HOSPADM

## 2024-03-11 RX ORDER — FENTANYL/BUPIVACAINE/NS/PF 2MCG/ML-.1
PLASTIC BAG, INJECTION (ML) INJECTION CONTINUOUS
Status: DISCONTINUED | OUTPATIENT
Start: 2024-03-11 | End: 2024-03-11

## 2024-03-11 RX ORDER — DIPHENHYDRAMINE HYDROCHLORIDE 50 MG/ML
12.5 INJECTION INTRAMUSCULAR; INTRAVENOUS EVERY 4 HOURS PRN
Status: DISCONTINUED | OUTPATIENT
Start: 2024-03-11 | End: 2024-03-11

## 2024-03-11 RX ORDER — ONDANSETRON HYDROCHLORIDE 2 MG/ML
4 INJECTION, SOLUTION INTRAVENOUS ONCE
Status: DISCONTINUED | OUTPATIENT
Start: 2024-03-11 | End: 2024-03-11

## 2024-03-11 RX ORDER — PRENATAL WITH FERROUS FUM AND FOLIC ACID 3080; 920; 120; 400; 22; 1.84; 3; 20; 10; 1; 12; 200; 27; 25; 2 [IU]/1; [IU]/1; MG/1; [IU]/1; MG/1; MG/1; MG/1; MG/1; MG/1; MG/1; UG/1; MG/1; MG/1; MG/1; MG/1
1 TABLET ORAL DAILY
Status: DISCONTINUED | OUTPATIENT
Start: 2024-03-11 | End: 2024-03-12 | Stop reason: HOSPADM

## 2024-03-11 RX ORDER — SODIUM CHLORIDE 0.9 % (FLUSH) 0.9 %
10 SYRINGE (ML) INJECTION
Status: DISCONTINUED | OUTPATIENT
Start: 2024-03-11 | End: 2024-03-12 | Stop reason: HOSPADM

## 2024-03-11 RX ORDER — NALBUPHINE HYDROCHLORIDE 10 MG/ML
2.5 INJECTION, SOLUTION INTRAMUSCULAR; INTRAVENOUS; SUBCUTANEOUS ONCE
Status: DISCONTINUED | OUTPATIENT
Start: 2024-03-11 | End: 2024-03-11

## 2024-03-11 RX ORDER — IBUPROFEN 600 MG/1
600 TABLET ORAL EVERY 6 HOURS
Status: DISCONTINUED | OUTPATIENT
Start: 2024-03-11 | End: 2024-03-12 | Stop reason: HOSPADM

## 2024-03-11 RX ORDER — HYDROCORTISONE 25 MG/G
CREAM TOPICAL 3 TIMES DAILY PRN
Status: DISCONTINUED | OUTPATIENT
Start: 2024-03-11 | End: 2024-03-12 | Stop reason: HOSPADM

## 2024-03-11 RX ORDER — OXYTOCIN/RINGER'S LACTATE 30/500 ML
334 PLASTIC BAG, INJECTION (ML) INTRAVENOUS ONCE AS NEEDED
Status: DISCONTINUED | OUTPATIENT
Start: 2024-03-11 | End: 2024-03-12 | Stop reason: HOSPADM

## 2024-03-11 RX ORDER — ACETAMINOPHEN 325 MG/1
650 TABLET ORAL EVERY 6 HOURS PRN
Status: DISCONTINUED | OUTPATIENT
Start: 2024-03-11 | End: 2024-03-12 | Stop reason: HOSPADM

## 2024-03-11 RX ORDER — TRANEXAMIC ACID 10 MG/ML
1000 INJECTION, SOLUTION INTRAVENOUS EVERY 30 MIN PRN
Status: DISCONTINUED | OUTPATIENT
Start: 2024-03-11 | End: 2024-03-12 | Stop reason: HOSPADM

## 2024-03-11 RX ADMIN — SODIUM CHLORIDE, POTASSIUM CHLORIDE, SODIUM LACTATE AND CALCIUM CHLORIDE: 600; 310; 30; 20 INJECTION, SOLUTION INTRAVENOUS at 01:03

## 2024-03-11 RX ADMIN — DEXTROSE MONOHYDRATE 3 MILLION UNITS: 50 INJECTION, SOLUTION INTRAVENOUS at 03:03

## 2024-03-11 RX ADMIN — DIPHENHYDRAMINE HYDROCHLORIDE 12.5 MG: 50 INJECTION, SOLUTION INTRAMUSCULAR; INTRAVENOUS at 12:03

## 2024-03-11 RX ADMIN — Medication 6 MILLI-UNITS/MIN: at 02:03

## 2024-03-11 RX ADMIN — IBUPROFEN 600 MG: 600 TABLET, FILM COATED ORAL at 11:03

## 2024-03-11 RX ADMIN — DOCUSATE SODIUM 200 MG: 100 CAPSULE, LIQUID FILLED ORAL at 08:03

## 2024-03-11 RX ADMIN — IBUPROFEN 600 MG: 600 TABLET, FILM COATED ORAL at 03:03

## 2024-03-11 RX ADMIN — DEXTROSE MONOHYDRATE 3 MILLION UNITS: 50 INJECTION, SOLUTION INTRAVENOUS at 12:03

## 2024-03-11 RX ADMIN — MISOPROSTOL 800 MCG: 200 TABLET ORAL at 05:03

## 2024-03-11 NOTE — PLAN OF CARE
LC visit to room to review pumping for an NICU baby. Gave mother NICU Mother's Breastfeeding Guide. Showed mother how to work pump, how to keep track of pumpings, how to label nicu breastmilk, how to clean pump parts and bring milk to NICU even if it is only a drop of milk. NICU uses mother's milk for mouth care so even small amounts are ok to bring to NICU. Mother aware to pump 8 or more times a day. Showed mother how to use Symphony pump on initiate setting. Call RN with any further questions.Mother may want to start thinking about what she will pump with when she gets home. May need to call insurance for pump options.

## 2024-03-11 NOTE — PROGRESS NOTES
"LABOR NOTE    S:  Complaints: No.  Epidural working:  yes      MD to bedside for cervical check     O: /69   Pulse 72   Temp 98 °F (36.7 °C) (Axillary)   Resp 16   Ht 5' 5" (1.651 m)   Wt 69.4 kg (153 lb)   LMP 02/21/2023 (Approximate)   SpO2 100%   Breastfeeding No   BMI 25.46 kg/m²     FHT: 120s Cat 1 +accels, -decels, moderate variability (reassuring)  CTX: q 2-3 minutes, pit @ 4  SVE: 5/70/-2, brooks out, AROM clear     Timeline:   2030: 1.5/70/-3, brooks placed, pit started   0030: 5/70/-2, AROM clear     PLAN:    Continue Close Maternal/Fetal Monitoring  Pitocin Augmentation per protocol  Recheck 4 hours or PRN      aTlita Barragan MD   PGY-4, OB-GYN       "

## 2024-03-11 NOTE — LACTATION NOTE
Diabetes [employer] pump, tubing, collections containers and labels brought to bedside.  Discussed proper pump setting of initiation phase.  Instructed on proper usage of pump and to adjust suction according to maximum comfort level. Educated on the frequency and duration of pumping in order to promote and maintain a full milk supply.  Hands on pumping technique reviewed.  Encouraged hand expression after pumping.  Instructed on cleaning of breast pump parts.  Written instructions also given.  Pt verbalized understanding and appropriate recall for proper milk handling, collection, labeling, storage and transportation.

## 2024-03-11 NOTE — PLAN OF CARE
Patient has been screened for Social Work discharge planning needs. Based on documentation in medical record, baby with cardiac anomalies. NICU present and baby will likely transport to NICU. NICU SW team will assess for further needs. Should any other discharge planning needs arise, please consult .        [phone]   OB SCREEN   Assessment Type Discharge Planning Assessment   Source of Information health record   Received Prenatal Care Yes   Any indications/suspicions for None   Is this a teen pregnancy No   Is the baby in NICU Yes   Indication for adoption/[facility] No   Indication for DME/post-acute needs No   HIV (+) No   Any congenital  disorders No  (cardiology consulted)   Fetal demise/ death No

## 2024-03-11 NOTE — L&D DELIVERY NOTE
[facility] - Labor & Delivery  Vaginal Delivery   Operative Note    SUMMARY      cephalic after approximately 10 minutes of maternal pushing.  Under epidural anesthesia.  Infant delivered OA over perineum.  Female infant also tolerated the delivery well and was placed on mothers abdomen for skin to skin and bulb suctioning performed.  Cord clamped and cut. Infant handed off to NICU team for further evaluation in the setting of known fetal cardiac anomalies.   Umbilical venous blood obtained.  L periurethral laceration reapproximated with interrupted suture of 3-0 vicryl.   Placenta delivered spontaneously and IV pitocin given.  Uterine tone noted. No cervical lacerations.  Patient tolerated delivery well.    cc  Staff present for entire procedure.  S/L/N counts correct x2.    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 of mother in corbett pregnancy, antepartum    Gestational hypertension without significant proteinuria in third trimester    History of ectopic pregnancy    Fetal pelvic kidney, left    Fetal pericardial effusion affecting management of mother     (spontaneous vaginal delivery)     Admitting GA: 38w2d    Delivery Information for Adriana Palacio    Birth information:  YOB: 2022   Time of birth: 5:08 AM   Sex: female   Head Delivery Date/Time:     Delivery type:    Gestational Age: 38w2d        Delivery Providers    Delivering clinician:            Measurements    Weight:   Length:          Apgars    Living status: Living  Apgar Component Scores:  1 min.:  5 min.:  10 min.:  15 min.:  20 min.:    Skin color:  0  0       Heart rate:  2  2       Reflex irritability:  2  2        Muscle tone:  2  2       Respiratory effort:  2  2       Total:  8  8       Apgars assigned by: NICU                         Interventions/Resuscitation    Method: Bulb Suctioning, Blow By Oxygen, Tactile Stimulation, Deep Suctioning, NICU Attended       Cord    No data filed       Placenta    Placenta delivery date/time:   Placenta removal:            Labor Events:       labor:       Labor Onset Date/Time:         Dilation Complete Date/Time:         Start Pushing Date/Time:         Start Pushing Date/Time:       Rupture Date/Time: 24  0039         Rupture type: ARM (Artificial Rupture)         Fluid Amount:       Fluid Color: Clear               steroids:       Antibiotics given for GBS:       Induction:       Indications for induction:        Augmentation:       Indications for augmentation:       Labor complications:       Additional complications:          Cervical ripening:                     Delivery:      Episiotomy:       Indication for Episiotomy:       Perineal Lacerations:   Repaired:      Periurethral Laceration:   Repaired:     Labial Laceration:   Repaired:     Sulcus Laceration:   Repaired:     Vaginal Laceration:   Repaired:     Cervical Laceration:   Repaired:     Repair suture:       Repair # of packets:       Last Value - EBL - Nursing (mL):       Sum - EBL - Nursing (mL): 0     Last Value - EBL - Anesthesia (mL):      Calculated QBL (mL): 305      Running total QBL (mL): 305      Vaginal Sweep Performed:       Surgicount Correct:       Vaginal Packing:   Quantity:       Other providers:            Details (if applicable):  Trial of Labor      Categorization:      Priority:     Indications for :     Incision Type:       Additional  information:  Forceps:    Vacuum:    Breech:    Observed anomalies    Other (Comments):

## 2024-03-11 NOTE — PLAN OF CARE
VSS. Patient ambulating and voiding independently and without difficulty. Fundus firm without massage, midline, with light rubra noted. Pain controlled without PRN pain medications. Safety maintained, bed low and in a locked position. Pumping every 2-3 hours. No further concerns at this time, will continue to monitor.

## 2024-03-11 NOTE — LACTATION NOTE
Mother was taught hand expression of breastmilk/colostrum. She was instructed to:  Sit upright and lean forward, if possible.  When feasible, apply warm, wet compress over breasts for a few minutes.   Perform gentle breast massage.  Form a C with her hand and place it about 1 inch back from the areola with the nipple centered between her index finger and her thumb.  Press, compress, relax:  Using her finger and thumb, apply pressure in an inward direction toward the breast without stretching the tissue, compress the breast tissue between her finger and thumb, then relax her finger and thumb. Repeat process for a few minutes.  Rotate placement of finger and thumb on the breasts to facilitate emptying.  Collect expressed breastmilk/colostrum with a spoon or cup and feed immediately to the baby, if able.  If unable to feed immediately, place breastmilk/colostrum directly into a sterile storage container for later use. Place the babys breast milk label (with the date and time of collection and the names of mother's medications) on the container. Reviewed proper handling and storage of expressed breastmilk.   Patient effectively return demonstrated and verbalized understanding.   The patient's inquiring what his, CT Angio Thoracic Aorta results, are from 11/19/19.  Please advise.

## 2024-03-11 NOTE — ANESTHESIA PROCEDURE NOTES
Epidural    Patient location during procedure: OB   Reason for block: primary anesthetic   Reason for block: labor analgesia requested by patient and obstetrician  Diagnosis: iup   Start time: 3/10/2024 10:25 PM  Timeout: 3/10/2024 10:25 PM  End time: 3/10/2024 10:25 PM  Surgery related to: Vaginal Delivery    Staffing  Performing Provider: Rex Steiner DO  Authorizing Provider: Ashley Valerio MD    Staffing  Performed by: Rex Steiner DO  Authorized by: Ashley Valerio MD        Preanesthetic Checklist  Completed: patient identified, IV checked, risks and benefits discussed, surgical consent, monitors and equipment checked, pre-op evaluation, timeout performed, anesthesia consent given, hand hygiene performed and patient being monitored  Preparation  Patient position: sitting  Prep: ChloraPrep  Patient monitoring: Pulse Ox  Reason for block: primary anesthetic   Epidural  Skin Anesthetic: lidocaine 1%  Skin Wheal: 3 mL  Administration type: continuous  Approach: midline  Interspace: L3-4    Injection technique: DOMINGUEZ air  Needle and Epidural Catheter  Needle type: Tuohy   Needle gauge: 17  Needle length: 3.5 inches  Needle insertion depth: 6 cm  Catheter type: [employer]  Catheter size: 19 G  Catheter at skin depth: 10 cm  Insertion Attempts: 1  Test dose: 3 mL of lidocaine 1.5% with Epi 1-to-200,000  Additional Documentation: incremental injection, negative aspiration for heme and CSF, no paresthesia on injection, no signs/symptoms of IV or SA injection, no significant pain on injection and no significant complaints from patient  Needle localization: anatomical landmarks  Medications:  Volume per aspiration: 5 mL  Time between aspirations: 5 minutes   Assessment  Ease of block: easy  Patient's tolerance of the procedure: comfortable throughout block and no complaints No inadvertent dural puncture with Tuohy.  Dural puncture performed with spinal needle.

## 2024-03-12 VITALS
DIASTOLIC BLOOD PRESSURE: 85 MMHG | OXYGEN SATURATION: 97 % | HEART RATE: 66 BPM | BODY MASS INDEX: 25.49 KG/M2 | HEIGHT: 65 IN | RESPIRATION RATE: 18 BRPM | TEMPERATURE: 98 F | SYSTOLIC BLOOD PRESSURE: 130 MMHG | WEIGHT: 153 LBS

## 2024-03-12 PROBLEM — D64.9 ANEMIA: Status: ACTIVE | Noted: 2024-03-12

## 2024-03-12 LAB
BASOPHILS # BLD AUTO: 0.05 K/UL (ref 0–0.2)
BASOPHILS NFR BLD: 0.5 % (ref 0–1.9)
DIFFERENTIAL METHOD BLD: ABNORMAL
EOSINOPHIL # BLD AUTO: 0.2 K/UL (ref 0–0.5)
EOSINOPHIL NFR BLD: 1.9 % (ref 0–8)
ERYTHROCYTE [DISTWIDTH] IN BLOOD BY AUTOMATED COUNT: 13.2 % (ref 11.5–14.5)
HCT VFR BLD AUTO: 31.5 % (ref 37–48.5)
HGB BLD-MCNC: 10.1 G/DL (ref 12–16)
IMM GRANULOCYTES # BLD AUTO: 0.04 K/UL (ref 0–0.04)
IMM GRANULOCYTES NFR BLD AUTO: 0.4 % (ref 0–0.5)
LYMPHOCYTES # BLD AUTO: 2.8 K/UL (ref 1–4.8)
LYMPHOCYTES NFR BLD: 25.1 % (ref 18–48)
MCH RBC QN AUTO: 27.2 PG (ref 27–31)
MCHC RBC AUTO-ENTMCNC: 32.1 G/DL (ref 32–36)
MCV RBC AUTO: 85 FL (ref 82–98)
MONOCYTES # BLD AUTO: 1.1 K/UL (ref 0.3–1)
MONOCYTES NFR BLD: 9.7 % (ref 4–15)
NEUTROPHILS # BLD AUTO: 6.9 K/UL (ref 1.8–7.7)
NEUTROPHILS NFR BLD: 62.4 % (ref 38–73)
NRBC BLD-RTO: 0 /100 WBC
PLATELET # BLD AUTO: 261 K/UL (ref 150–450)
PMV BLD AUTO: 11.2 FL (ref 9.2–12.9)
RBC # BLD AUTO: 3.71 M/UL (ref 4–5.4)
WBC # BLD AUTO: 11.1 K/UL (ref 3.9–12.7)

## 2024-03-12 PROCEDURE — 25000003 PHARM REV CODE 250

## 2024-03-12 PROCEDURE — 99238 HOSP IP/OBS DSCHRG MGMT 30/<: CPT | Mod: TH,,, | Performed by: OBSTETRICS & GYNECOLOGY

## 2024-03-12 PROCEDURE — 85025 COMPLETE CBC W/AUTO DIFF WBC: CPT | Performed by: OBSTETRICS & GYNECOLOGY

## 2024-03-12 PROCEDURE — 36415 COLL VENOUS BLD VENIPUNCTURE: CPT | Performed by: OBSTETRICS & GYNECOLOGY

## 2024-03-12 RX ORDER — OXYCODONE HYDROCHLORIDE 5 MG/1
5 TABLET ORAL EVERY 4 HOURS PRN
Qty: 20 TABLET | Refills: 0 | Status: SHIPPED | OUTPATIENT
Start: 2024-03-12

## 2024-03-12 RX ORDER — IBUPROFEN 600 MG/1
600 TABLET ORAL EVERY 6 HOURS
Qty: 90 TABLET | Refills: 0 | Status: SHIPPED | OUTPATIENT
Start: 2024-03-12

## 2024-03-12 RX ORDER — DEXTROMETHORPHAN HYDROBROMIDE, GUAIFENESIN 5; 100 MG/5ML; MG/5ML
650 LIQUID ORAL EVERY 6 HOURS
Qty: 45 TABLET | Refills: 0 | Status: SHIPPED | OUTPATIENT
Start: 2024-03-12

## 2024-03-12 RX ORDER — NORETHINDRONE 0.35 MG/1
1 TABLET ORAL DAILY
Qty: 84 TABLET | Refills: 3 | Status: SHIPPED | OUTPATIENT
Start: 2024-03-12 | End: 2025-03-12

## 2024-03-12 RX ORDER — DOCUSATE SODIUM 100 MG/1
200 CAPSULE, LIQUID FILLED ORAL 2 TIMES DAILY PRN
Qty: 90 CAPSULE | Refills: 0 | Status: SHIPPED | OUTPATIENT
Start: 2024-03-12

## 2024-03-12 RX ADMIN — IBUPROFEN 600 MG: 600 TABLET, FILM COATED ORAL at 12:03

## 2024-03-12 NOTE — MEDICAL/APP STUDENT
POSTPARTUM PROGRESS NOTE    Subjective:     PPD/POD#: 1   Procedure:    EGA: 38w2d   N/V: No   F/C: No   Abd Pain: Mild, well-controlled with oral pain medication   Lochia: Mild   Voiding: Yes   Ambulating: Yes   Bowel fnc: Yes   Contraception: OCP     Objective:      Temp:  [97.8 °F (36.6 °C)-98.4 °F (36.9 °C)] 98 °F (36.7 °C)  Pulse:  [60-86] 64  Resp:  [17-18] 18  SpO2:  [98 %-99 %] 98 %  BP: (118-141)/(62-86) 136/73    Abdomen: Soft, appropriately tender   Uterus: Firm, no fundal tenderness   Incision: N/A     Lab Review    Recent Labs   Lab 03/07/24  1558 03/10/24  1941   NA  --  137   K  --  4.0   CL  --  110   CO2  --  19*   BUN  --  8   CREATININE 0.76 0.7   GLU  --  78   PROT  --  6.5   BILITOT  --  0.4   ALKPHOS  --  363*   ALT 11 13   AST 16 19         Recent Labs   Lab 03/07/24  1558 03/10/24  1941 03/12/24  0538   WBC 9.39 7.53 11.10   HGB 10.8* 10.7* 10.1*   HCT 34.7* 32.9* 31.5*   MCV 89.2 84 85    271 261           I/O    Intake/Output Summary (Last 24 hours) at 3/12/2024 0727  Last data filed at 3/11/2024 2015  Gross per 24 hour   Intake 245.11 ml   Output 700 ml   Net -454.89 ml          Assessment and Plan:   Postpartum care:  - Patient doing well.  - Continue routine management and advances.    Iron def. anemia  - H/H as above  - QBL: 250mL  - asymptomatic    gHTN  - BP as above  - asymptomatic  - preE labs as above  - UOP: /271 cc/kg/hr  - Mag: not indicated  - Hypertensive agent none    Izzy Sanchez is a 23 year old  on PPD1 after . She was laying in bed and reported feeling well with only some intermittent cramping she rates a 5/10, as well as mild pain at periurethral laceration repair site with walking. She is able to use the bathroom and ambulate. Patient is pumping and the baby is still being evaluated for cardiac anomalies.     Elier Kirby  Medical Student    A student assisted me with documenting this service.  I saw the patient and performed the history,  physical exam, and medical decision making.  I reviewed and verified all information documented by the student and made modifications to such documentation when appropriate.  -- WINSOME Lowery M.D.    Doing well.  OK for discharge at 24-hrs post-partum if patient desires.

## 2024-03-12 NOTE — DISCHARGE SUMMARY
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 now , PPD #1 who was admitted on 3/10/2024 at 38w1d for IOL 2/2  FGR and fetal cardiac anomalies (HLHS). Patient was subsequently admitted to labor and delivery unit with signed consents.     Labor course was uncomplicated and resulted in  without complications.     Please see delivery note for further details. Her postpartum course was uncomplicated. On discharge day, patient's pain is controlled with oral pain medications. Pt is tolerating ambulation without SOB or CP, and regular diet without N/V. Reports lochia is mild. Denies any HA, vision changes, F/C, LE swelling. Denies any breast pain/soreness.    Pt in stable condition and ready for discharge. She has been instructed to start and/or continue medications and follow up with her obstetrics provider as listed below.    Temp:  [97.8 °F (36.6 °C)-98.4 °F (36.9 °C)] 98 °F (36.7 °C)  Pulse:  [60-86] 64  Resp:  [17-18] 18  SpO2:  [98 %-99 %] 98 %  BP: (118-141)/(62-86) 136/73     Abdomen: Soft, appropriately tender   Uterus: Firm, no fundal tenderness   Incision: N/A     Pertinent studies:  CBC  Recent Labs   Lab 24  1558 03/10/24  1947  24  0538   WBC 9.39 7.53 11.10   HGB 10.8* 10.7* 10.1*   HCT 34.7* 32.9* 31.5*   MCV 89.2 84 85    271 261        There is no immunization history for the selected administration types on file for this patient.     Delivery:    Episiotomy: None   Lacerations: None   Repair suture:     Repair # of packets: 1   Blood loss (ml):       Birth information:  YOB: 2022   Time of birth: 5:08 AM   Sex: female   Delivery type: Vaginal, Spontaneous   Gestational Age: 38w2d     Measurements    Weight:   Length:          Delivery Clinician: Delivery Providers    Delivering clinician: Betty Ladd MD   Provider Role    Talita Barragan MD Resident    Viviane Astudillo, RN Registered Nurse    Enedelia Correa RN Registered Nurse    Kelin Campos RN Registered Nurse    Javier Asif, [facility] Tech    [facility],  Resident             Additional  information:  Forceps:    Vacuum:    Breech:    Observed anomalies      Living?:     Apgars    Living status: Living  Apgar Component Scores:  1 min.:  5 min.:  10 min.:  15 min.:  20 min.:    Skin color:  0  0       Heart rate:  2  2       Reflex irritability:  2  2       Muscle tone:  2  2       Respiratory effort:  2  2       Total:  8  8       Apgars assigned by: NICU         Placenta: Delivered:       appearance    Patient Instructions:   Current Discharge Medication List        START taking these medications    Details   acetaminophen (TYLENOL) 325 MG tablet Take 2 tablets (650 mg total) by mouth every 6 (six) hours.  Qty: 90 tablet, Refills: 0      docusate sodium (COLACE) 100 MG capsule Take 2 capsules (200 mg total) by mouth 2 (two) times daily as needed for Constipation.  Qty: 90 capsule, Refills: 0      ibuprofen (ADVIL,MOTRIN) 600 MG tablet Take 1 tablet (600 mg total) by mouth every 6 (six) hours.  Qty: 90 tablet, Refills: 0      norethindrone (MICRONOR) 0.35 mg tablet Take 1 tablet (0.35 mg total) by mouth once daily.  Qty: 90  tablet, Refills: 3           CONTINUE these medications which have CHANGED    Details   oxyCODONE (ROXICODONE) 5 MG immediate release tablet Take 1 tablet (5 mg total) by mouth every 4 (four) hours as needed for Pain.  Qty: 20 tablet, Refills: 0    Comments: Quantity prescribed more than 7 day supply? No           CONTINUE these medications which have NOT CHANGED    Details   PNV no.153/FA/om3/dha/epa/fish (PRENATAL GUMMIES ORAL) Take by mouth.      prenatal vit 55-iron-folic-om3 29-1-430 mg CPKD Take by mouth.             Discharge Procedure Orders   BREAST PUMP FOR HOME USE     Order Specific Question Answer Comments   Type of pump: Electric    Weight: 69.4 kg (153 lb)    Length of need (1-99 months): 99      Diet Adult Regular     No driving until:   Order Comments: No driving until not taking narcotic pain medication.     Pelvic Rest   Order Comments: Pelvic rest until 6 weeks after discharge. Nothing in vagina -no sex, tampons, douching, etc.     Notify your health care provider if you experience any of the following:  temperature >100.4     Notify your health care provider if you experience any of the following:  persistent nausea and vomiting or diarrhea     Notify your health care provider if you experience any of the following:  severe uncontrolled pain     Notify your health care provider if you experience any of the following:  redness, tenderness, or signs of infection (pain, swelling, redness, odor or green/yellow discharge around incision site)     Notify your health care provider if you experience any of the following:  difficulty breathing or increased cough     Notify your health care provider if you experience any of the following:  severe persistent headache     Notify your health care provider if you experience any of the following:  worsening rash     Notify your health care provider if you experience any of the following:  persistent dizziness, light-headedness, or visual disturbances      Notify your health care provider if you experience any of the following:  increased confusion or weakness     Notify your health care provider if you experience any of the following:   Order Comments: Heavy vaginal bleeding saturating more than 1 pad per hr for at least consecutive 2 hrs.     Activity as tolerated        Follow-up Information       [facility] Follow up in 1 week(s).    Specialty: Obstetrics and Gynecology  Why: Blood pressure check  Contact information:  [address], [facility] [address]  [facility] [address] [phone]  Additional information:  [facility] - [facility], 1st Floor   Please park in [place]onique Cast[place]             Primary OBGYN Follow up in 6 week(s).    Why: Postpartum visit                            Sharon Miles MD  OB/GYN PGY1

## 2024-03-12 NOTE — NURSING
The following message was sent to [facility]:  Hi, can you please call ms shannan to set up a post partum bp &mood check on or before Tuesday 3/19 with any provider. Pt lives in [place] and will be staying in [place] to be near infant in picu. Thank you

## 2024-03-12 NOTE — PLAN OF CARE
24 1055   OB SCREEN   Assessment Type Discharge Planning Brief Assessment   Source of Information patient   Received Prenatal Care Yes   Any indications/suspicions for None   Is this a teen pregnancy No   Is the baby in NICU No  ( is in Peds CVICU at [facility])   Indication for adoption/Safe Haven No   Indication for DME/post-acute needs No   HIV (+) No   Any congenital  disorders No   Fetal demise/ death No       SW intern met with pt at bedside. Pt stated she would like a Lyft to [facility] to be with . SW intern provided pt with resources to Parentline, WIC, Child Development Milestones, and ACEs information. SW intern provided additional contact info for PICU  Luiz Connor, MSZHAO [phone].

## 2024-03-12 NOTE — LACTATION NOTE
Pt pumping when LC entered room. Pt shared that she intends to discharge. LC did DC teaching for mother pumping for her baby. Pt has NICU Mothers Lactation Booklet for lactation. Reviewed how to work pump, keep track of pumpings, label breastmilk, clean pump parts and safely store and transport milk. Pt aware to pump 8 or more times a day for 15-20 minutes. LC encouraged Pt to obtain breast pump through insurance. Pt aware of rental pump availability. LC encouraged Pt to discuss use of Medela Symphony breast pump in the PICU at [facility].  Mother has lactation phone number to call for further questions. Pt verbalized understanding and questions answered.

## 2024-03-12 NOTE — ANESTHESIA POSTPROCEDURE EVALUATION
Anesthesia Post Evaluation    Patient: Izzy Goodrich Huval    Procedure(s) Performed: * No procedures listed *    Final Anesthesia Type: epidural      Patient location during evaluation: med/surg floor  Patient participation: Yes- Able to Participate  Level of consciousness: awake and alert  Post-procedure vital signs: reviewed and stable  Pain management: adequate  Airway patency: patent  HEIDI mitigation strategies: Multimodal analgesia and Use of major conduction anesthesia (spinal/epidural) or peripheral nerve block  PONV status at discharge: No PONV  Anesthetic complications: no      Cardiovascular status: blood pressure returned to baseline and hemodynamically stable  Respiratory status: unassisted, spontaneous ventilation and room air  Hydration status: euvolemic  Follow-up not needed.              Vitals Value Taken Time   /85 03/12/24 0842   Temp 36.7 °C (98.1 °F) 03/12/24 0842   Pulse 66 03/12/24 0842   Resp 18 03/12/24 0842   SpO2 97 % 03/12/24 0842         No case tracking events are documented in the log.      Pain/Yovani Score: Pain Rating Prior to Med Admin: 6 (3/11/2024 11:48 PM)

## 2024-03-12 NOTE — PLAN OF CARE
Plan of care note completed by BRITTANIE Leblanc student intern with [facility] [employer] Master of Social Work program.  Information discussed and reviewed with Eloisa Holder LCSW. Appropriate information and documentation.

## 2024-03-12 NOTE — PLAN OF CARE
Pt doing well Pt ready for discharge. Pt has no complaints or needs. Pt pain controlled with rx meds. Pt ambulating, voiding without difficulty. Lyft transportation provided.

## 2024-03-12 NOTE — MEDICAL/APP STUDENT
POSTPARTUM PROGRESS NOTE    Subjective:     PPD/POD#: 1   Procedure:    EGA: 38w2d   N/V: No   F/C: No   Abd Pain: Mild, well-controlled with oral pain medication   Lochia: Mild   Voiding: Yes   Ambulating: Yes   Bowel fnc: Yes   Contraception: OCP     Objective:      Temp:  [97.8 °F (36.6 °C)-98.4 °F (36.9 °C)] 98 °F (36.7 °C)  Pulse:  [60-91] 64  Resp:  [17-18] 18  SpO2:  [98 %-100 %] 98 %  BP: (118-141)/(62-86) 136/73    Abdomen: Soft, appropriately tender   Uterus: Firm, no fundal tenderness   Incision: N/A     Lab Review    Recent Labs   Lab 03/07/24  1558 03/10/24  1941   NA  --  137   K  --  4.0   CL  --  110   CO2  --  19*   BUN  --  8   CREATININE 0.76 0.7   GLU  --  78   PROT  --  6.5   BILITOT  --  0.4   ALKPHOS  --  363*   ALT 11 13   AST 16 19       Recent Labs   Lab 03/07/24  1558 03/10/24  1941 03/12/24  0538   WBC 9.39 7.53 11.10   HGB 10.8* 10.7* 10.1*   HCT 34.7* 32.9* 31.5*   MCV 89.2 84 85    271 261         I/O    Intake/Output Summary (Last 24 hours) at 3/12/2024 0612  Last data filed at 3/11/2024 2015  Gross per 24 hour   Intake 886.69 ml   Output 700 ml   Net 186.69 ml        Assessment and Plan:   Postpartum care:  - Patient doing well.  - Continue routine management and advances.    Anemia ***  - H/H as above  - QBL: 250mL  - asymptomatic  - iron/colace     gHTN  - BP as above  - asymptomatic  - preE labs as above  - UOP: /271 cc/kg/hr  - Mag: not indicated  - Hypertensive agent none    Abnormal FTA   - Positive FTA x 2, negative RPR   - no further evaluation done this pregnancy      History of Ex-lap  - H/o Ex-lap/R salpingectomy for ruptured ectopic pregnancy with hemoperitoneum     Izzy Sanchez is a 23 year old  on PPD1 after . She was laying in bed and reported feeling well with only some intermittent cramping she rates a 5/10, as well as mild pain at periurethral laceration repair site with walking. She is able to use the bathroom and ambulate. Patient is  pumping and the baby is still being evaluated for cardiac anomalies.     Elier Kirby  Medical Student

## 2024-03-13 ENCOUNTER — PATIENT MESSAGE (OUTPATIENT)
Dept: OBSTETRICS AND GYNECOLOGY | Facility: OTHER | Age: 24
End: 2024-03-13
Payer: MEDICAID

## 2024-03-19 NOTE — PROGRESS NOTES
[facility] Fetal Medicine ([place]) Fetal Cardiology Clinic    Today, I had the pleasure of evaluating Izzy Palacio who is now 23 y.o. and carrying her second pregnancy at 37 3/7 weeks gestation with an DENISE of 3/23/24. She was referred for evaluation of the fetal heart due to a history of fetal hypoplastic left heart syndrome.      She is carrying a female fetus, named Gianna.      Obstetric History:    .  Her OB care is by Dr. Keenan.  Her MFM care is by Renny Pollard and Tano.    Past Medical History:   Diagnosis Date    Anemia     NOT SURE IF CURRENT OR NOT         Current Outpatient Medications:     PNV no.153/FA/om3/dha/epa/fish (PRENATAL GUMMIES ORAL), Take by mouth., Disp: , Rfl:     acetaminophen (TYLENOL) 650 MG TbSR, Take 1 tablet (650 mg total) by mouth every 6 (six) hours., Disp: 45 tablet, Rfl: 0    docusate sodium (COLACE) 100 MG capsule, Take 2 capsules (200 mg total) by mouth 2 (two) times daily as needed for Constipation., Disp: 90 capsule, Rfl: 0    ibuprofen (ADVIL,MOTRIN) 600 MG tablet, Take 1 tablet (600 mg total) by mouth every 6 (six) hours., Disp: 90 tablet, Rfl: 0    norethindrone (MICRONOR) 0.35 mg tablet, Take 1 tablet (0.35 mg total) by mouth once daily., Disp: 84 tablet, Rfl: 3    oxyCODONE (ROXICODONE) 5 MG immediate release tablet, Take 1 tablet (5 mg total) by mouth every 4 (four) hours as needed for Pain., Disp: 20 tablet, Rfl: 0    prenatal vit 55-iron-folic-om3 29-1-430 mg CPKD, Take by mouth., Disp: , Rfl:     Family History: Negative for congenital heart disease, early coronary artery disease, sudden unexplained death, connective tissues disorders, genetic syndromes, multiple miscarriages or other congenital anomalies.    Social History: Ms. Palacio is single.  She is from [place].    FETAL ECHOCARDIOGRAM (summary):  Fetal echocardiogram at 37 3/7 weeks gestation for a history of fetal hypoplastic left heart syndrome. DENISE 3/23/24. 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 Normal tricuspid valve with no regurgitation. Normal pulmonary valve with no obstruction or regurgitation. Severely hypoplastic left ventricle. Moderately dilated right atrium and ventricle. Normal right ventricular systolic function. No ectopy or sustained arrhythmia demonstrated throughout the study. No ventricular level shunting. No pericardial effusion.   (Full report in electronic medical record)    Impression:  Single active female fetus at 31 wga.  Single active fetus at 37 wga.  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  My expectation is that this fetus will deliver at full term.  The baby will have a prostaglandin infusion after birth to maintain the patency of the ductus arteriosus.  After birth, the baby will have a Krystal surgery in the first week of life.  This will provide stable systemic and pulmonary blood flow.  The baby will stay in the Pediatric Cardiovascular Intensive Care Unit for the first 30 days after the Krystal since this is the highest risk period for serious complications or death.  Our surgical outcomes for the Sparks surgery are excellent and our mortality risk is less than 10% and has been 0% for the past two years.  Two somewhat common complications after surgery include poor oral feeding and the need for a gastric tube and respiratory failure and the need for a tracheostomy.  The risk of a G tube is about 50% and the risk for a tracheostomy is less than 5%.  After discharge, the baby will be followed closely in clinic about once a week until the second surgery, the Nazario at about 6 months of age.  At this point, the baby's hemodynamics are much more stable and follow up requirements are much less stringent.  The final surgery is at around 3-5 years of age and is called the Fontan operation.  This provides close to normal systemic oxygen levels.  Long term mortality expectations are 60 to 70 percent survival to 5  years in all infants who undergo the Dallas procedure and rise to 90 percent survival to 18 years in patients who survive to 12 months.  These patients hearts will never be considered normal, and they will have to follow with a cardiologist for their entire lives.  Finally, these patients have special neurodevelopmental issues that are well known.  At [facility], we have a special cardiac neurodevelopmental follow up clinic to assess and address these concerns.     I discussed with her that fetal echocardiography is insufficiently sensitive to rule out all septal defects, anomalies of pulmonary and systemic veins, arch anomalies, and some valvar abnormalities, nor can it ensure that the ductus arteriosus and foramen ovale will spontaneously close.     Recommendations:  1. 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 Any prenatal genetic diagnoses or other major associated abnormalities, conditions: none  3. Primary Fetal Cardiologist: Lydia  Delivery and post steve recommendations as of last fetal visit:  1. Recommend delivery at tertiary care center such as [facility] where Pediatric Cardiology and Congenital Heart Surgery care are immediately available  2. From fetal cardiac perspective, ok for vaginal delivery, preferably after 39 weeks gestation  3. Recommend scheduled weekday delivery during daytime hours so that all needed caretakers are immediately available  4. PGE: is necessary and should be started post delivery.   Brief echocardiogram before umbilical lines to confirm anatomy, followed by complete echocardiogram.  5. Likely need for urgent intervention within first hours after delivery: No   6. Cardiology Consultant recommended to attend delivery: No   7. Transition to CVICU    8. Cardiology consult: Yes  9.   Genetics recommendation:CMA  10.  surgical or cath intervention: Krystal surgery      The above information was  discussed in detail including the use of diagrams, with 60 minutes of total face to face time, with greater than 50% with counseling and coordination of care.  The discussion of the diagnosis and treatment options is as described above.      Benigno Freeman MD, MPH  Pediatric and Fetal Cardiology  [facility]   [address] [place] [id number]    Office: [phone]  Cell: [phone]

## 2024-06-10 PROBLEM — O36.5930 POOR FETAL GROWTH AFFECTING MANAGEMENT OF MOTHER IN THIRD TRIMESTER: Status: RESOLVED | Noted: 2024-01-10 | Resolved: 2024-06-10

## 2024-08-07 ENCOUNTER — TELEPHONE (OUTPATIENT)
Dept: GYNECOLOGY | Facility: CLINIC | Age: 24
End: 2024-08-07
Payer: MEDICAID

## 2025-03-13 ENCOUNTER — TELEPHONE (OUTPATIENT)
Dept: MATERNAL FETAL MEDICINE | Facility: CLINIC | Age: 25
End: 2025-03-13
Payer: MEDICAID

## 2025-03-13 DIAGNOSIS — Z82.79 FAMILY HISTORY OF CONGENITAL HEART DEFECT: Primary | ICD-10-CM

## 2025-03-21 ENCOUNTER — TELEPHONE (OUTPATIENT)
Dept: MATERNAL FETAL MEDICINE | Facility: CLINIC | Age: 25
End: 2025-03-21
Payer: MEDICAID

## 2025-03-21 PROBLEM — O28.3 ABNORMAL FETAL ULTRASOUND: Status: RESOLVED | Noted: 2024-02-01 | Resolved: 2025-03-21

## 2025-03-21 PROBLEM — O28.3 ABNORMAL PRENATAL ULTRASOUND: Status: RESOLVED | Noted: 2024-01-10 | Resolved: 2025-03-21

## 2025-03-21 PROBLEM — O09.299 PREGNANCY WITH POOR OBSTETRIC HISTORY: Status: ACTIVE | Noted: 2025-03-21

## 2025-03-21 PROBLEM — Z87.59 HISTORY OF ECTOPIC PREGNANCY: Status: RESOLVED | Noted: 2024-02-01 | Resolved: 2025-03-21

## 2025-03-21 PROBLEM — O35.BXX0: Status: RESOLVED | Noted: 2024-01-10 | Resolved: 2025-03-21

## 2025-03-21 PROBLEM — D64.9 ANEMIA: Status: RESOLVED | Noted: 2024-03-12 | Resolved: 2025-03-21

## 2025-03-21 PROBLEM — Z82.79 FAMILY HISTORY OF CONGENITAL HEART DEFECT: Status: ACTIVE | Noted: 2025-03-21

## 2025-03-21 PROBLEM — O36.8990 FETAL PERICARDIAL EFFUSION AFFECTING MANAGEMENT OF MOTHER: Status: RESOLVED | Noted: 2024-02-08 | Resolved: 2025-03-21

## 2025-03-21 NOTE — PROGRESS NOTES
Maternal Fetal Medicine New Consult    Subjective:     Patient ID: [id number]    Chief Complaint: MFM Consult       HPI: 24 y.o.  female  at 12w6d gestation with Estimated Date of Delivery: 25. by early US, not consistent with LMP. She is sent for MFM consultation for history of child with CHD.     Her last child had hypoplastic left heart syndrome.  That child unfortunately passed away at 6-month-old.  She had gestational hypertension at the end of that pregnancy.       She denies any personal or family history of aneuploidy or anomalies. She denies any exposure to high fevers, viral rashes, illicit drugs or alcohol in this pregnancy.  She denies any leaking fluid, vaginal bleeding, contractions, decreased fetal movement. Denies headaches, visual disturbances, or epigastric pain.       Pregnancy complications include:  Problem List[1]    Past Medical History:   Diagnosis Date    2023    NOT SURE IF CURRENT OR NOT       Past Surgical History:   Procedure Laterality Date    LAPAROTOMY, EXPLORATORY N/A 2023    Procedure: Ex Laparatomy, Right Salpingectomy, Evacuation of Hemoperitoneum;  Surgeon: Shayy Barksdale MD;  Location: [facility];  Service: OB/GYN;  Laterality: N/A;    SALPINGECTOMY N/A 2023    Procedure: SALPINGECTOMY;  Surgeon: Shayy Barksdale MD;  Location: [facility];  Service: OB/GYN;  Laterality: N/A;       Family History   Problem Relation Name Age of Onset    Diabetes Paternal Grandmother      Stroke Maternal Grandmother      Hypertension Maternal Grandmother      Diabetes Maternal Grandfather         Social History     Socioeconomic History    Marital status: Single   Tobacco Use    Smoking status: Never     Passive exposure: Never    Smokeless tobacco: Never   Substance and Sexual Activity    Alcohol use: Not Currently    Drug use: Never    Sexual activity: Not Currently     Partners: Male       Current Medications[2]    Review of patient's allergies indicates:  No Known  Mom called back shortly as baby awake and crying, ready to feed. Reviewed different feeding position options and showed mom how to do hand expression. Mom easily brings drops of colostrum to the surface. Got infant skin to skin w/ mom on left breast in football hold. Mom has small, compact breasts, shorter nipples. Showed mom how to very get baby on deeply and wide, took a few attempts to get baby on well. Once on well, baby fed good for 15 minutes. Some audible swallows heard and no c/o pain after helped them flange infants lower lip. Nipple round on release. Burped infant and assisted mom on other breast as well. Reviewed signs of good latch. Baby continued to feed well. No c/o pain.  Will follow up in am. "Allergies     Review of Systems   12 point review of systems conducted, negative except as stated in the history of present illness. See HPI for details.      Objective:     Visit Vitals  /78 (BP Location: Left arm, Patient Position: Sitting)   Pulse (!) 111   Resp 18   Ht 5' 5" (1.651 m)   Wt 65.6 kg (144 lb 11.2 oz)   LMP  (LMP Unknown)   SpO2 99%   BMI 24.08 kg/m²        Physical Exam  Vitals and nursing note reviewed.   Constitutional:       General: She is not in acute distress.     Appearance: Normal appearance.   HENT:      Head: Normocephalic and atraumatic.      Nose: Nose normal. No congestion.      Mouth/Throat:      Pharynx: Oropharynx is clear.   Eyes:      General: No scleral icterus.     Conjunctiva/sclera: Conjunctivae normal.   Cardiovascular:      Rate and Rhythm: Regular rhythm.   Pulmonary:      Effort: No respiratory distress.      Breath sounds: Normal breath sounds. No wheezing.   Abdominal:      General: Abdomen is flat.      Palpations: Abdomen is soft.      Tenderness: There is no abdominal tenderness. There is no right CVA tenderness, left CVA tenderness or guarding.      Comments: No CVA tenderness gravid uterus.    Musculoskeletal:         General: Normal range of motion.      Cervical back: Neck supple.      Right lower leg: No edema.      Left lower leg: No edema.   Skin:     General: Skin is warm.      Findings: No bruising or rash.   Neurological:      General: No focal deficit present.      Mental Status: She is oriented to person, place, and time.      Deep Tendon Reflexes: Reflexes normal.      Comments: Normal reflexes   Psychiatric:         Mood and Affect: Mood normal.         Behavior: Behavior normal.         Thought Content: Thought content normal.         Judgment: Judgment normal.         Assessment/Plan:     24 y.o.  female with IUP at 12w6d    History of child with CHD   Viable IUP with size consistent with established dating on 3/24/25      I have discussed " with her the background risk of congenital heart disease is 1% in the general population.  With this history, the risk may be increased up to 2-3%. The reassurance obtained by the normal ultrasound and limitation of prenatal ultrasound in detecting congenital heart disease with detection rate of congenital anomalies under optimal circumstances was discussed.  She was advised of need for  evaluation.      The option of getting another opinion of seeing a pediatric cardiologist to reassess fetal heart was discussed, including evaluation for TAPVR and some other anomalies such as coarctation of aorta that we are not checking for, which could be evaluated more fully by fetal echo. However, the limitation of that ultrasound was addressed in the same tone as discussed above with need for  evaluation was emphasized. She accepted pediatric cardiologist referral which was sent today, to be done around 20-22 weeks gestation.      History of gestational hypertension  Discussed increased risk of recurrence that could occur at earlier gestational age with morbidity/mortality that would be associated with that.  With her risk factors for preeclampsia including preeclampsia/GHTN in a previous pregnancy ,  ancestry, low socioeconomic status, and previous low birthweight or SGA baby, discussed recommendations for low dose aspirin use to decrease risks for adverse outcomes, including preeclampsia, low birth weight and  delivery. Low-dose aspirin reduced the risk for preeclampsia by 15% in clinical trials and reduced the risk for  birth by 20% and FGR by 20%, and  mortality by around 20%.  After discussing risks/benefits of its use, it was agreed to start asa 81 mg daily today and continue until delivery.. Also, recommend using in all future pregnancies starting at 12 weeks.  Preeclampsia precautions given.    Patient was counseled of the high-risk of recurrence.  We will plan to do a  level 2 scan around 20 weeks.  Start patient on daily aspirin to lower the risk of preeclampsia.      Follow up in about 7 weeks (around 5/12/2025) for M Followup, Level 2 Ultrasound.     Future Appointments   Date Time Provider Department Center   5/12/2025 10:45 AM ROOM 3, [facility]   5/12/2025 11:00 AM Samuel Pollard MD [facility]          Patient was evaluated by MARY Silva, and and Dr. Pollard.  Final assessment and recommendations as stated above were made by Dr. Pollard.    This note was created with the assistance of [employer] voice recognition software. There may be transcription errors as a result of using this technology, however minimal. Effort has been made to ensure accuracy of transcription, but any obvious errors or omissions should be clarified with the author of the document.           [1]   Patient Active Problem List  Diagnosis    Pregnancy with poor obstetric history    Family history of congenital heart defect   [2]   Current Outpatient Medications   Medication Sig Dispense Refill    PNV no.153/FA/om3/dha/epa/fish (PRENATAL GUMMIES ORAL) Take by mouth.      acetaminophen (TYLENOL) 650 MG TbSR Take 1 tablet (650 mg total) by mouth every 6 (six) hours. (Patient not taking: Reported on 3/24/2025) 45 tablet 0    docusate sodium (COLACE) 100 MG capsule Take 2 capsules (200 mg total) by mouth 2 (two) times daily as needed for Constipation. (Patient not taking: Reported on 3/24/2025) 90 capsule 0    ibuprofen (ADVIL,MOTRIN) 600 MG tablet Take 1 tablet (600 mg total) by mouth every 6 (six) hours. (Patient not taking: Reported on 3/24/2025) 90 tablet 0    norethindrone (MICRONOR) 0.35 mg tablet Take 1 tablet (0.35 mg total) by mouth once daily. (Patient not taking: Reported on 3/24/2025) 84 tablet 3    oxyCODONE (ROXICODONE) 5 MG immediate release tablet Take 1 tablet (5 mg total) by mouth every 4 (four) hours as needed for Pain. (Patient not taking:  Reported on 3/24/2025) 20 tablet 0    prenatal vit 55-iron-folic-om3 29-1-430 mg CPKD Take by mouth. (Patient not taking: Reported on 3/24/2025)       No current facility-administered medications for this visit.

## 2025-03-24 ENCOUNTER — OFFICE VISIT (OUTPATIENT)
Dept: MATERNAL FETAL MEDICINE | Facility: CLINIC | Age: 25
End: 2025-03-24
Payer: MEDICAID

## 2025-03-24 ENCOUNTER — PROCEDURE VISIT (OUTPATIENT)
Dept: MATERNAL FETAL MEDICINE | Facility: CLINIC | Age: 25
End: 2025-03-24
Payer: MEDICAID

## 2025-03-24 VITALS
HEART RATE: 111 BPM | SYSTOLIC BLOOD PRESSURE: 124 MMHG | DIASTOLIC BLOOD PRESSURE: 78 MMHG | BODY MASS INDEX: 24.11 KG/M2 | RESPIRATION RATE: 18 BRPM | OXYGEN SATURATION: 99 % | HEIGHT: 65 IN | WEIGHT: 144.69 LBS

## 2025-03-24 DIAGNOSIS — O09.299 PREGNANCY WITH POOR OBSTETRIC HISTORY: Primary | ICD-10-CM

## 2025-03-24 DIAGNOSIS — Z82.79 FAMILY HISTORY OF CONGENITAL HEART DEFECT: ICD-10-CM

## 2025-03-24 PROCEDURE — 1160F RVW MEDS BY RX/DR IN RCRD: CPT | Mod: CPTII,S$GLB,, | Performed by: OBSTETRICS & GYNECOLOGY

## 2025-03-24 PROCEDURE — 1159F MED LIST DOCD IN RCRD: CPT | Mod: CPTII,S$GLB,, | Performed by: OBSTETRICS & GYNECOLOGY

## 2025-03-24 PROCEDURE — 3074F SYST BP LT 130 MM HG: CPT | Mod: CPTII,S$GLB,, | Performed by: OBSTETRICS & GYNECOLOGY

## 2025-03-24 PROCEDURE — 3078F DIAST BP <80 MM HG: CPT | Mod: CPTII,S$GLB,, | Performed by: OBSTETRICS & GYNECOLOGY

## 2025-03-24 PROCEDURE — 76801 OB US < 14 WKS SINGLE FETUS: CPT | Mod: S$GLB,,, | Performed by: OBSTETRICS & GYNECOLOGY

## 2025-03-24 PROCEDURE — 3008F BODY MASS INDEX DOCD: CPT | Mod: CPTII,S$GLB,, | Performed by: OBSTETRICS & GYNECOLOGY

## 2025-03-24 PROCEDURE — 99214 OFFICE O/P EST MOD 30 MIN: CPT | Mod: TH,S$GLB,, | Performed by: OBSTETRICS & GYNECOLOGY

## 2025-04-01 ENCOUNTER — LAB VISIT (OUTPATIENT)
Dept: LAB | Facility: HOSPITAL | Age: 25
End: 2025-04-01
Attending: OBSTETRICS & GYNECOLOGY
Payer: MEDICAID

## 2025-04-01 DIAGNOSIS — Z34.82 PRENATAL CARE, SUBSEQUENT PREGNANCY IN SECOND TRIMESTER: Primary | ICD-10-CM

## 2025-04-01 LAB
BASOPHILS # BLD AUTO: 0.02 X10(3)/MCL
BASOPHILS NFR BLD AUTO: 0.2 %
EOSINOPHIL # BLD AUTO: 0 X10(3)/MCL (ref 0–0.9)
EOSINOPHIL NFR BLD AUTO: 0 %
ERYTHROCYTE [DISTWIDTH] IN BLOOD BY AUTOMATED COUNT: 14.5 % (ref 11.5–17)
GROUP & RH: NORMAL
HCT VFR BLD AUTO: 43 % (ref 37–47)
HGB BLD-MCNC: 13.7 G/DL (ref 12–16)
IMM GRANULOCYTES # BLD AUTO: 0.03 X10(3)/MCL (ref 0–0.04)
IMM GRANULOCYTES NFR BLD AUTO: 0.3 %
INDIRECT COOMBS: NORMAL
LYMPHOCYTES # BLD AUTO: 0.96 X10(3)/MCL (ref 0.6–4.6)
LYMPHOCYTES NFR BLD AUTO: 11.2 %
MCH RBC QN AUTO: 28.5 PG (ref 27–31)
MCHC RBC AUTO-ENTMCNC: 31.9 G/DL (ref 33–36)
MCV RBC AUTO: 89.4 FL (ref 80–94)
MONOCYTES # BLD AUTO: 0.44 X10(3)/MCL (ref 0.1–1.3)
MONOCYTES NFR BLD AUTO: 5.1 %
NEUTROPHILS # BLD AUTO: 7.15 X10(3)/MCL (ref 2.1–9.2)
NEUTROPHILS NFR BLD AUTO: 83.2 %
NRBC BLD AUTO-RTO: 0 %
PLATELET # BLD AUTO: 350 X10(3)/MCL (ref 130–400)
PMV BLD AUTO: 10.3 FL (ref 7.4–10.4)
RBC # BLD AUTO: 4.81 X10(6)/MCL (ref 4.2–5.4)
SPECIMEN OUTDATE: NORMAL
T PALLIDUM AB SER QL: REACTIVE
TSH SERPL-ACNC: 0.85 UIU/ML (ref 0.35–4.94)
WBC # BLD AUTO: 8.6 X10(3)/MCL (ref 4.5–11.5)

## 2025-04-01 PROCEDURE — 87340 HEPATITIS B SURFACE AG IA: CPT

## 2025-04-01 PROCEDURE — 86592 SYPHILIS TEST NON-TREP QUAL: CPT

## 2025-04-01 PROCEDURE — 85660 RBC SICKLE CELL TEST: CPT

## 2025-04-01 PROCEDURE — 85025 COMPLETE CBC W/AUTO DIFF WBC: CPT

## 2025-04-01 PROCEDURE — 87389 HIV-1 AG W/HIV-1&-2 AB AG IA: CPT

## 2025-04-01 PROCEDURE — 86780 TREPONEMA PALLIDUM: CPT

## 2025-04-01 PROCEDURE — 86762 RUBELLA ANTIBODY: CPT

## 2025-04-01 PROCEDURE — 86850 RBC ANTIBODY SCREEN: CPT | Performed by: OBSTETRICS & GYNECOLOGY

## 2025-04-01 PROCEDURE — 84443 ASSAY THYROID STIM HORMONE: CPT

## 2025-04-01 PROCEDURE — 87086 URINE CULTURE/COLONY COUNT: CPT

## 2025-04-01 PROCEDURE — 36415 COLL VENOUS BLD VENIPUNCTURE: CPT

## 2025-04-01 PROCEDURE — 86803 HEPATITIS C AB TEST: CPT

## 2025-04-02 LAB
HBV SURFACE AG SERPL QL IA: NONREACTIVE
HCV AB SERPL QL IA: NONREACTIVE
HGB S BLD QL SOLY: NEGATIVE
HIV 1+2 AB+HIV1 P24 AG SERPL QL IA: NONREACTIVE
RPR SER QL: NORMAL
RUBV IGG SERPL IA-ACNC: 3.7
RUBV IGG SERPL QL IA: POSITIVE

## 2025-04-03 LAB — BACTERIA UR CULT: ABNORMAL

## 2025-04-05 LAB — T PALLIDUM AB SER QL AGGL: POSITIVE

## 2025-04-17 ENCOUNTER — HOSPITAL ENCOUNTER (OUTPATIENT)
Facility: HOSPITAL | Age: 25
Discharge: HOME | End: 2025-04-17
Attending: OBSTETRICS & GYNECOLOGY | Admitting: OBSTETRICS & GYNECOLOGY
Payer: COMMERCIAL

## 2025-04-17 VITALS
DIASTOLIC BLOOD PRESSURE: 63 MMHG | HEART RATE: 83 BPM | RESPIRATION RATE: 16 BRPM | SYSTOLIC BLOOD PRESSURE: 120 MMHG | TEMPERATURE: 98 F

## 2025-04-17 DIAGNOSIS — O98.119 SYPHILIS AFFECTING PREGNANCY: ICD-10-CM

## 2025-04-17 PROCEDURE — G0378 HOSPITAL OBSERVATION PER HR: HCPCS

## 2025-04-17 PROCEDURE — 63600175 PHARM REV CODE 636 W HCPCS: Mod: JZ,TB | Performed by: OBSTETRICS & GYNECOLOGY

## 2025-04-17 PROCEDURE — G0379 DIRECT REFER HOSPITAL OBSERV: HCPCS

## 2025-04-17 PROCEDURE — 96372 THER/PROPH/DIAG INJ SC/IM: CPT | Performed by: OBSTETRICS & GYNECOLOGY

## 2025-04-17 RX ORDER — NAPROXEN SODIUM 220 MG/1
81 TABLET, FILM COATED ORAL DAILY
COMMUNITY

## 2025-04-17 RX ADMIN — PENICILLIN G BENZATHINE 1.2 MILLION UNITS: 1200000 INJECTION, SUSPENSION INTRAMUSCULAR at 02:04

## 2025-04-28 DIAGNOSIS — O13.3 GESTATIONAL HYPERTENSION WITHOUT SIGNIFICANT PROTEINURIA IN THIRD TRIMESTER: ICD-10-CM

## 2025-04-28 DIAGNOSIS — Z82.79 FAMILY HISTORY OF CONGENITAL HEART DEFECT: Primary | ICD-10-CM

## 2025-04-28 DIAGNOSIS — O09.299 PREGNANCY WITH POOR OBSTETRIC HISTORY: ICD-10-CM

## 2025-05-01 NOTE — DISCHARGE SUMMARY
[facility]                       [address] [place] [address]    PATIENT NAME:       HARJIT VÁSQUEZ  YOB: 2000  CSN:                [id number]   MRN:                [id number]  ADMIT DATE:         04/17/2025 14:04:00  PHYSICIAN:          Dennis Keenan Jr, MD                          DISCHARGE SUMMARY    DATE OF DISCHARGE:  04/17/2025 15:20:00    DIAGNOSIS:  Pregnancy at 30 weeks.  New diagnosis of syphilis, admitted for IM   penicillin.  The patient was admitted.  She had an NST.  Given her injection   with no complication.  NST reactive.  No reaction to the medicine.  She will be   discharged home.    CONDITION:  Stable.    DIET:  Regular.    ACTIVITY:  Pelvic rest.    MEDICATIONS:  Vitamins.    DISCHARGE INSTRUCTIONS:  Follow up next week for a second injection and again in   the following week for the third injection.  Follow up with Dr. Keenan and Dr. Pollard as scheduled.        ______________________________  Dennis Keenan Jr, MD    DJE/AQS  DD:  05/01/2025  Time:  12:53AM  DT:  05/01/2025  Time:  02:42AM  Job #:  [id number]      DISCHARGE SUMMARY

## 2025-05-12 ENCOUNTER — PROCEDURE VISIT (OUTPATIENT)
Dept: MATERNAL FETAL MEDICINE | Facility: CLINIC | Age: 25
End: 2025-05-12
Payer: MEDICAID

## 2025-05-12 ENCOUNTER — OFFICE VISIT (OUTPATIENT)
Dept: MATERNAL FETAL MEDICINE | Facility: CLINIC | Age: 25
End: 2025-05-12
Payer: MEDICAID

## 2025-05-12 ENCOUNTER — LAB VISIT (OUTPATIENT)
Dept: LAB | Facility: HOSPITAL | Age: 25
End: 2025-05-12
Attending: OBSTETRICS & GYNECOLOGY
Payer: MEDICAID

## 2025-05-12 VITALS
BODY MASS INDEX: 25.33 KG/M2 | WEIGHT: 152 LBS | SYSTOLIC BLOOD PRESSURE: 137 MMHG | DIASTOLIC BLOOD PRESSURE: 88 MMHG | HEIGHT: 65 IN | HEART RATE: 117 BPM

## 2025-05-12 DIAGNOSIS — Z82.79 FAMILY HISTORY OF CONGENITAL HEART DEFECT: ICD-10-CM

## 2025-05-12 DIAGNOSIS — O09.299 PREGNANCY WITH POOR OBSTETRIC HISTORY: ICD-10-CM

## 2025-05-12 DIAGNOSIS — Z82.79 FAMILY HISTORY OF CONGENITAL HEART DEFECT: Primary | ICD-10-CM

## 2025-05-12 DIAGNOSIS — O35.9XX0 SUSPECTED FETAL ANOMALY, ANTEPARTUM, SINGLE OR UNSPECIFIED FETUS: ICD-10-CM

## 2025-05-12 DIAGNOSIS — O43.92 ABNORMAL PLACENTA AFFECTING MANAGEMENT OF MOTHER IN SECOND TRIMESTER: ICD-10-CM

## 2025-05-12 DIAGNOSIS — O16.2 ELEVATED BLOOD PRESSURE AFFECTING PREGNANCY IN SECOND TRIMESTER, ANTEPARTUM: ICD-10-CM

## 2025-05-12 DIAGNOSIS — O13.3 GESTATIONAL HYPERTENSION WITHOUT SIGNIFICANT PROTEINURIA IN THIRD TRIMESTER: ICD-10-CM

## 2025-05-12 DIAGNOSIS — Z03.73 SUSPECTED FETAL ANOMALY NOT FOUND: ICD-10-CM

## 2025-05-12 LAB
ALT SERPL-CCNC: 9 UNIT/L (ref 0–55)
AST SERPL-CCNC: 13 UNIT/L (ref 11–45)
CREAT SERPL-MCNC: 0.74 MG/DL (ref 0.55–1.02)
ERYTHROCYTE [DISTWIDTH] IN BLOOD BY AUTOMATED COUNT: 14.3 % (ref 11.5–17)
GFR SERPLBLD CREATININE-BSD FMLA CKD-EPI: >60 ML/MIN/1.73/M2
HCT VFR BLD AUTO: 41.2 % (ref 37–47)
HGB BLD-MCNC: 13.1 G/DL (ref 12–16)
LDH SERPL-CCNC: 212 U/L (ref 125–220)
MCH RBC QN AUTO: 29.4 PG (ref 27–31)
MCHC RBC AUTO-ENTMCNC: 31.8 G/DL (ref 33–36)
MCV RBC AUTO: 92.4 FL (ref 80–94)
NRBC BLD AUTO-RTO: 0 %
PLATELET # BLD AUTO: 273 X10(3)/MCL (ref 130–400)
PMV BLD AUTO: 10.3 FL (ref 7.4–10.4)
RBC # BLD AUTO: 4.46 X10(6)/MCL (ref 4.2–5.4)
URATE SERPL-MCNC: 4 MG/DL (ref 2.6–6)
WBC # BLD AUTO: 9.57 X10(3)/MCL (ref 4.5–11.5)

## 2025-05-12 PROCEDURE — 3008F BODY MASS INDEX DOCD: CPT | Mod: CPTII,S$GLB,, | Performed by: OBSTETRICS & GYNECOLOGY

## 2025-05-12 PROCEDURE — 36415 COLL VENOUS BLD VENIPUNCTURE: CPT

## 2025-05-12 PROCEDURE — 76811 OB US DETAILED SNGL FETUS: CPT | Mod: S$GLB,,, | Performed by: OBSTETRICS & GYNECOLOGY

## 2025-05-12 PROCEDURE — 84450 TRANSFERASE (AST) (SGOT): CPT

## 2025-05-12 PROCEDURE — 3079F DIAST BP 80-89 MM HG: CPT | Mod: CPTII,S$GLB,, | Performed by: OBSTETRICS & GYNECOLOGY

## 2025-05-12 PROCEDURE — 3075F SYST BP GE 130 - 139MM HG: CPT | Mod: CPTII,S$GLB,, | Performed by: OBSTETRICS & GYNECOLOGY

## 2025-05-12 PROCEDURE — 82565 ASSAY OF CREATININE: CPT

## 2025-05-12 PROCEDURE — 99214 OFFICE O/P EST MOD 30 MIN: CPT | Mod: TH,S$GLB,, | Performed by: OBSTETRICS & GYNECOLOGY

## 2025-05-12 PROCEDURE — 83615 LACTATE (LD) (LDH) ENZYME: CPT

## 2025-05-12 PROCEDURE — 1159F MED LIST DOCD IN RCRD: CPT | Mod: CPTII,S$GLB,, | Performed by: OBSTETRICS & GYNECOLOGY

## 2025-05-12 PROCEDURE — 1160F RVW MEDS BY RX/DR IN RCRD: CPT | Mod: CPTII,S$GLB,, | Performed by: OBSTETRICS & GYNECOLOGY

## 2025-05-12 PROCEDURE — 85027 COMPLETE CBC AUTOMATED: CPT

## 2025-05-12 PROCEDURE — 84460 ALANINE AMINO (ALT) (SGPT): CPT

## 2025-05-12 PROCEDURE — 84550 ASSAY OF BLOOD/URIC ACID: CPT

## 2025-05-12 NOTE — PROGRESS NOTES
Maternal Fetal Medicine New Consult    Subjective:     Patient ID: [id number]    Chief Complaint: MFM follow up with US      HPI: 24 y.o.  female  at 19w6d gestation with Estimated Date of Delivery: 25. by early US, not consistent with LMP. She is sent for MFM consultation for history of child with CHD.     Her last child had hypoplastic left heart syndrome.  That child unfortunately passed away at 6-month-old.  She had gestational hypertension at the end of that pregnancy.  The patient is on daily aspirin to lower the risk of preeclampsia.  Patient was accompanied by her significant other.       She denies any personal or family history of aneuploidy or anomalies. She denies any exposure to high fevers, viral rashes, illicit drugs or alcohol in this pregnancy.  She denies any leaking fluid, vaginal bleeding, contractions, decreased fetal movement. Denies headaches, visual disturbances, or epigastric pain.       Pregnancy complications include:  Problem List[1]    Past Medical History:   Diagnosis Date    2023    NOT SURE IF CURRENT OR NOT    Syphilis, unspecified        Past Surgical History:   Procedure Laterality Date    LAPAROTOMY, EXPLORATORY N/A 2023    Procedure: Ex Laparatomy, Right Salpingectomy, Evacuation of Hemoperitoneum;  Surgeon: Shayy Barksdale MD;  Location: [facility] OR;  Service: OB/GYN;  Laterality: N/A;    SALPINGECTOMY N/A 2023    Procedure: SALPINGECTOMY;  Surgeon: Shayy Barksdale MD;  Location: [facility];  Service: OB/GYN;  Laterality: N/A;       Family History   Problem Relation Name Age of Onset    Diabetes Paternal Grandmother      Stroke Maternal Grandmother      Hypertension Maternal Grandmother      Diabetes Maternal Grandfather         Social History     Socioeconomic History    Marital status: Single   Tobacco Use    Smoking status: Never     Passive exposure: Never    Smokeless tobacco: Never   Substance and Sexual Activity    Alcohol use: Not Currently     "Drug use: Never    Sexual activity: Not Currently     Partners: Male       Current Medications[2]    Review of patient's allergies indicates:  No Known Allergies     Review of Systems   12 point review of systems conducted, negative except as stated in the history of present illness. See HPI for details.      Objective:     Visit Vitals  /88 (BP Location: Right arm, Patient Position: Sitting)   Pulse (!) 117   Ht 5' 5" (1.651 m)   Wt 68.9 kg (152 lb)   LMP  (LMP Unknown)   BMI 25.29 kg/m²        Physical Exam  Vitals and nursing note reviewed.   Constitutional:       Appearance: Normal appearance.   HENT:      Head: Normocephalic and atraumatic.      Nose: Nose normal. No congestion.   Pulmonary:      Effort: Pulmonary effort is normal.   Skin:     Findings: No rash.   Neurological:      Mental Status: She is alert and oriented to person, place, and time.   Psychiatric:         Mood and Affect: Mood normal.         Behavior: Behavior normal.         Thought Content: Thought content normal.         Judgment: Judgment normal.         Assessment/Plan:     24 y.o.  female with IUP at 19w6d    History of child with CHD   Viable IUP with size consistent with established dating on 3/24/25      I have discussed with her the background risk of congenital heart disease is 1% in the general population.  With this history, the risk may be increased up to 2-3%. The reassurance obtained by the normal ultrasound and limitation of prenatal ultrasound in detecting congenital heart disease with detection rate of congenital anomalies under optimal circumstances was discussed.  She was advised of need for  evaluation.        Patient was scheduled for a fetal echo that she is planning to do tomorrow on May 13, 2025      History of gestational hypertension with a mild elevated blood pressure labile today concerning for the possibility of underlying chronic hypertension.    Patient will continue aspirin " daily.    Preeclampsia labs were ordered.  If confirmatory and another blood pressure elevation is noted then we will confirm mild underlying chronic hypertension.  Preeclampsia warnings reviewed.  Patient will follow a low-salt diet at this time with no medication needed    Placental lakes    Placental lakes are enlarged spaces in the placenta filled with maternal blood and are considered to be a normal finding in most cases. However, multiple placental lakes may increase risk for fetal growth restriction.  We will plan to check growth in 5 weeks to complete anatomy scan, and depending upon what blood pressure assessment and evaluation shows we will determine when follow-up ultrasound we will be done.. Expectant management.       Follow up in about 5 weeks (around 6/16/2025) for MFM follow-up, Repeat  ultrasound, to complete anatomy scan.     Future Appointments   Date Time Provider Department Center   5/12/2025  1:15 PM [facility]   6/16/2025 11:30 AM Samuel Pollard MD [facility]   6/16/2025 11:30 [facility]            Patient was evaluated by Camille Mello, MARY, and and Dr. Pollard.  Final assessment and recommendations as stated above were made by Dr. Pollard.    This note was created with the assistance of [employer] voice recognition software. There may be transcription errors as a result of using this technology, however minimal. Effort has been made to ensure accuracy of transcription, but any obvious errors or omissions should be clarified with the author of the document.             [1]   Patient Active Problem List  Diagnosis    Pregnancy with poor obstetric history    Family history of congenital heart defect    Syphilis affecting pregnancy    Suspected fetal anomaly, antepartum    Suspected fetal anomaly not found    Placental Lakes affecting management of mother in second trimester   [2]   Current Outpatient Medications    Medication Sig Dispense Refill    aspirin 81 MG Chew Take 81 mg by mouth once daily.      PNV no.153/FA/om3/dha/epa/fish (PRENATAL GUMMIES ORAL) Take by mouth.      acetaminophen (TYLENOL) 650 MG TbSR Take 1 tablet (650 mg total) by mouth every 6 (six) hours. (Patient not taking: Reported on 5/12/2025) 45 tablet 0    docusate sodium (COLACE) 100 MG capsule Take 2 capsules (200 mg total) by mouth 2 (two) times daily as needed for Constipation. (Patient not taking: Reported on 5/12/2025) 90 capsule 0    oxyCODONE (ROXICODONE) 5 MG immediate release tablet Take 1 tablet (5 mg total) by mouth every 4 (four) hours as needed for Pain. (Patient not taking: Reported on 5/12/2025) 20 tablet 0    prenatal vit 55-iron-folic-om3 29-1-430 mg CPKD Take by mouth. (Patient not taking: Reported on 5/12/2025)       No current facility-administered medications for this visit.

## 2025-05-16 ENCOUNTER — PATIENT MESSAGE (OUTPATIENT)
Dept: MATERNAL FETAL MEDICINE | Facility: CLINIC | Age: 25
End: 2025-05-16
Payer: MEDICAID

## 2025-06-09 DIAGNOSIS — Z82.79 FAMILY HISTORY OF CONGENITAL HEART DEFECT: Primary | ICD-10-CM

## 2025-06-09 DIAGNOSIS — Z36.2 ENCOUNTER FOR FOLLOW-UP ULTRASOUND OF FETAL ANATOMY: ICD-10-CM

## 2025-06-09 DIAGNOSIS — O09.299 PREGNANCY WITH POOR OBSTETRIC HISTORY: ICD-10-CM

## 2025-06-13 PROBLEM — O35.9XX0 SUSPECTED FETAL ANOMALY, ANTEPARTUM: Status: RESOLVED | Noted: 2025-05-12 | Resolved: 2025-06-13

## 2025-06-16 ENCOUNTER — TELEPHONE (OUTPATIENT)
Dept: MATERNAL FETAL MEDICINE | Facility: CLINIC | Age: 25
End: 2025-06-16

## 2025-06-23 ENCOUNTER — LAB VISIT (OUTPATIENT)
Dept: LAB | Facility: HOSPITAL | Age: 25
End: 2025-06-23
Attending: OBSTETRICS & GYNECOLOGY
Payer: MEDICAID

## 2025-06-23 DIAGNOSIS — Z34.82 PRENATAL CARE, SUBSEQUENT PREGNANCY IN SECOND TRIMESTER: Primary | ICD-10-CM

## 2025-06-23 PROBLEM — O10.012 PRE-EXISTING ESSENTIAL HYPERTENSION AFFECTING PREGNANCY IN SECOND TRIMESTER: Status: ACTIVE | Noted: 2025-06-23

## 2025-06-23 LAB
GLUCOSE 1H P 100 G GLC PO SERPL-MCNC: 71 MG/DL (ref 100–180)
HCT VFR BLD AUTO: 38.3 % (ref 37–47)
HGB BLD-MCNC: 12.3 G/DL (ref 12–16)

## 2025-06-23 PROCEDURE — 82950 GLUCOSE TEST: CPT

## 2025-06-23 PROCEDURE — 85014 HEMATOCRIT: CPT

## 2025-06-23 PROCEDURE — 36415 COLL VENOUS BLD VENIPUNCTURE: CPT

## 2025-06-23 NOTE — PROGRESS NOTES
"  Maternal Fetal Medicine Follow Up    Subjective:     Patient ID: [id number]    Chief Complaint: mfm followup w/us      HPI: Izzy Palacio is a 24 y.o. female  at 26w1d gestation with Estimated Date of Delivery: 25  who is here for follow-up consultation by M.    Her last child had hypoplastic left heart syndrome.  That child unfortunately passed away at 6-month-old.  She had fetal echo on 2025 that showed small mid muscular VSD with bidirectional shown otherwise normal.  Per pediatric Cardiology, patient okay for local delivery with routine care and follow-up at 1 month of age.  She had gestational hypertension at the end of that pregnancy.  She had elevated blood pressure at initial MFM consult at 19 weeks and is suspected to have underlying chronic hypertension.  She is on daily low dose aspirin to lower the risk of preeclampsia.  She had placental lakes seen on consult ultrasound.  She reports that she had syphilis treated x3 weeks with the last injection late April due to history of positive syphilis antibody but negative RPR.  She denies any history of infection, reports was only treated "out of caution". She is accompanied by her significant other Adrián Smith.       Interval history since last M visit: None.. She denies any leaking fluid, vaginal bleeding, contractions, decreased fetal movement. Denies headaches, visual disturbances, or epigastric pain.    Pregnancy complications include:   Problem List[1]    No changes to medical, surgical, family, social, or obstetric history.    Medications:  Current Outpatient Medications   Medication Instructions    acetaminophen (TYLENOL) 650 mg, Oral, Every 6 hours    aspirin 81 mg, Daily    docusate sodium (COLACE) 200 mg, Oral, 2 times daily PRN    oxyCODONE (ROXICODONE) 5 mg, Oral, Every 4 hours PRN    PNV no.153/FA/om3/dha/epa/fish (PRENATAL GUMMIES ORAL) Take by mouth.    prenatal vit 55-iron-folic-om3 29-1-430 mg CPKD Take by mouth. " "      Review of Systems   12 point review of systems conducted, negative except as stated in the history of present illness. See HPI for details.      Objective:     Visit Vitals  /75 (BP Location: Left arm, Patient Position: Sitting)   Pulse 89   Ht 5' 5" (1.651 m)   Wt 75.6 kg (166 lb 9.6 oz)   LMP  (LMP Unknown)   BMI 27.72 kg/m²        Physical Exam  Vitals and nursing note reviewed.   Constitutional:       Appearance: Normal appearance.   HENT:      Head: Normocephalic and atraumatic.      Nose: Nose normal. No congestion.   Cardiovascular:      Rate and Rhythm: Normal rate.   Pulmonary:      Effort: Pulmonary effort is normal.   Skin:     Findings: No rash.   Neurological:      Mental Status: She is alert and oriented to person, place, and time.   Psychiatric:         Mood and Affect: Mood normal.         Behavior: Behavior normal.         Thought Content: Thought content normal.         Judgment: Judgment normal.         Assessment/Plan:     24 y.o.  female with IUP at 26w1d    History of child with CHD, current fetus with small VSD  There is low normal fetal growth with an EFW of 792 g at the 28% and the AC at the 11%  on 25.  AFV is normal.    Because of range of error of ultrasound at this gestational age and possibility of more significant fetal growth restriction, a biophysical profile was done which was 8/8, and umbilical artery Doppler was done which was normal.    Ventricular septal defect is one of the most common congenital malformations of the heart accounting for 30% of all cardiac anomalies.      About 20% of fetuses with VSD on ultrasound have a chromosome abnormality. The presence of other anomalies on ultrasound increases the chance of an underlying genetic syndrome. VSD can be found in association with aneuploidy, particularly with Down Syndrome, Trisomy 13, Trisomy 18, and triploidy. Individuals with 22q11.21 deletion syndrome (Digeorge) are also commonly noted to have a " VSD.   VSD may also be seen in several single gene disorders including, but not limited to, CHARGE, VACTERL, Greco Lemli Opitz and Calderon-Leonarda syndrome. In the absence of a chromosomal or syndromic cause, the etiology appears to be multifactorial.     She was advised that the only diagnostic test at this time is genetic amniocentesis.  Benefits and risks of a genetic amniocentesis were discussed. Patient was offered genetic amniocentesis, after thorough counseling on benefits and risks of procedure, with very remote risk of complications and in some studies no identifiable increased risk, above background risk of spontaneous miscarriage, while some current data suggests risk up to 1 in 800 with early amniocentesis prior to 24 weeks, and remote risk of need for emergency delivery with all the complications of prematurity with amniocentesis after 24 weeks. She declined amniocentesis . She is aware of the need for  evaluation.    She was counseled on Cell free DNA understanding that it is an enhanced screening test but not a diagnostic test. It assesses risk for common aneuploidies such as Trisomy 13, 18, and 21 by evaluating cell-free fetal DNA in maternal circulation with a false positive rate less than 0.5% for Trisomy 21 and less reliable for Trisomy 13 and Trisomy 18. She had cell free DNA that was negative .     Per pediatric Cardiology, patient okay for local delivery with routine care and follow-up at 1 month of age.       Possible mild underlying chronic hypertension with history of gestational hypertension  Chronic hypertension complicates up to 2-5% of pregnancies and is associated with significant adverse pregnancy outcomes including  birth, fetal growth restriction, fetal demise, placental abruption, and  delivery. Recent data suggest higher risk of certain congenital anomalies, including, heart defects, hypospadias and esophageal atresia. The incidence of adverse outcomes seen in  "pregnancies complicated by chronic hypertension is related to the degree and duration of hypertension and to the association of other organ system involvement or damage. Most pregnant women with mild chronic hypertension have uneventful pregnancies with no end-organ involvement. Comparing women who developed superimposed preeclampsia with women who did not, the incidence of  birth was 100% versus 38%, the incidence of small-for-gestational-age (SGA) infants was 78% versus 15%, and the  mortality rate was 48% versus 0%. Besides superimposed preeclampsia or eclampsia, pregnancy complicated by chronic hypertension (especially if severe) may be associated with worsening or malignant hypertension, central nervous system hemorrhage, cardiac decompensation, and renal deterioration or failure.    The age of onset, results of previous evaluation, severity and duration of hypertension, and physical examination are important determinants of which clinical tests may be useful. Ideally, a woman with chronic hypertension should be evaluated before pregnancy to ascertain potentially reversible causes and end-organ involvement (eg, heart or kidney). Baseline laboratory tests may include serum creatinine, blood urea nitrogen, electrolytes, CBC and 24-hour urine evaluation for total protein and creatinine clearance. Women who have had hypertension for several years are more likely to have cardiomegaly, ischemic heart disease, renal involvement, and retinopathy. In patients with severe disease over 4 years, or long standing hypertension (typically if over 30 years old), tests which may prove useful in the evaluation of these women include electrocardiography, echocardiography, ophthalmologic examination, and renal ultrasonography.     Women with paroxysmal hypertension, frequent "hypertensive crisis," seizure disorders, or anxiety attacks should be evaluated for pheochromocytoma with measurements of 24-hour urine " vanillylmandelic acid, metanephrines, or unconjugated catecholamines. Magnetic resonance imaging after the first trimester or computed tomography may be useful for adrenal tumor localization. Renal artery stenosis appears to be more prevalent in patients with type-2 diabetes and coexistent hypertension. Doppler flow studies or magnetic resonance angiography are helpful to detect renal artery stenosis. Negative results from renal ultrasonography do not exclude renal artery stenosis.     In women with chronic hypertension, it can be difficult to discern worsening hypertension that might occur as a result of physiological changes of pregnancy in the third trimester from the development of preeclampsia. The use of certain laboratory tests such as serum creatinine, liver functions tests, hematocrit and platelets to compare to baseline values from early in pregnancy might serve to delineate these conditions. Routine screening of women with chronic hypertension with these laboratory tests is not routinely indicated.    I have shared with her the normal natural course of chronic hypertension in pregnancy with improvement early on and likely increasing blood pressure as the pregnancy advances. Based on findings of recent CHAP Study, it is recommended to utilize 140/90 as the threshold for initiation or titration of medical therapy for chronic hypertension in pregnancy, rather than the previously recommended threshold of 160/110. For patients on blood pressure medications at the start of pregnancy, in the absence of mitigating factors or side effects, they can be maintained on their medications, rather than discontinuing them and waiting to initiate treatment for blood pressures in the severe range. Commonly used medications include nifedipine and labetalol.    Vitals:    06/25/25 1055   BP: 129/75    With this blood pressure, she was advised to continue low salt diet.    Use aspirin as discussed.    She was advised of the  higher risk of fetal growth restriction and superimposed preeclampsia with her underlying chronic hypertension. The risk of placental abruption was also discussed. No prevention is available with her reporting any bleeding or cramping. She was also advised to report any headaches, visual problems, epigastric pain.    There is no consensus as to the most appropriate fetal surveillance test(s) or the interval and timing of testing in  with chronic hypertension. Thus, such testing should be individualized and based on clinical judgment and on severity of disease.    We will plan to recheck fetal growth in about 3 weeks. Recommend fetal testing starting around 32 weeks gestation until the end of pregnancy    Among patients with uncomplicated chronic hypertension with no additional risk factors, delivery at 38-39 weeks appears to provide optimal balance between the risk of adverse fetal and  outcomes. If no medication and normal fetal assessment, recommend delivery at 39 weeks. However, will reassess closer to EDC to determine optimal timeframe or GA for delivery, based on current evaluation at that time.    Recommend close followup after delivery with Primary OB as well as PCP for ongoing evaluation/treatment for HTN.         Placental lakes  Placental lakes are enlarged spaces in the placenta filled with maternal blood and are considered to be a normal finding in most cases. However, multiple placental lakes may increase risk for fetal growth restriction.  We will plan to recheck fetal growth with ultrasound as above. Expectant management.       Positive syphilis antibody, negative RPR  Patient adamantly denies any history of syphilis infection.  Discussed with the patient with positive antibody and negative RPR in  in , could possibly be false-positive or reflective of previous exposure to syphilis.  She has had adequate treatment with penicillin x3 weeks.  However, with her hesitant to discuss  with her partner, emphasized to her, although difficult conversation, it will be of utmost importance to have her partner tested to ensure that he is negative for syphilis.  If he is positive, then she is at risk for reinfection.  Follow up RPR ordered today.  Consider to repeat around 34-36 weeks.  If stays nonreactive, no retreatment is indicated.  If any for fold increase, may need retreatment.  Follow follow up scheduled for RPR as posttreatment per CDC schedule postpartum.    This is low-normal fetal growth that is probably benign.  Normal Doppler.  We will rechecked again in 3 weeks.  Continue daily aspirin.  Patient will do a follow-up RPR we will have partner tested.      Follow up in about 3 weeks (around 7/16/2025) for MFM Followup, Repeat Ultrasound.     Future Appointments   Date Time Provider Department Center   7/16/2025 11:00 AM ROOM 2, [facility]   7/16/2025 11:30 AM Samuel Pollard MD [facility]        ELVIRA involvement: Patient was evaluated by MARY Silva and Dr. Pollard.  Final assessment and recommendations as stated above were made by Dr. Pollard.    This note was created with the assistance of [employer] voice recognition software. There may be transcription errors as a result of using this technology, however minimal. Effort has been made to ensure accuracy of transcription, but any obvious errors or omissions should be clarified with the author of the document.               [1]   Patient Active Problem List  Diagnosis    Light for dates affecting management of mother, second trimester, not applicable or unspecified fetus    Ventricular septal defect (VSD) of fetus affecting management of pregnancy    Pregnancy with poor obstetric history    Family history of congenital heart defect    Syphilis affecting pregnancy    Placental Lakes affecting management of mother in second trimester    Pre-existing essential hypertension affecting pregnancy in second  trimester

## 2025-06-25 ENCOUNTER — PROCEDURE VISIT (OUTPATIENT)
Dept: MATERNAL FETAL MEDICINE | Facility: CLINIC | Age: 25
End: 2025-06-25
Payer: MEDICAID

## 2025-06-25 ENCOUNTER — OFFICE VISIT (OUTPATIENT)
Dept: MATERNAL FETAL MEDICINE | Facility: CLINIC | Age: 25
End: 2025-06-25
Payer: MEDICAID

## 2025-06-25 ENCOUNTER — TELEPHONE (OUTPATIENT)
Dept: MATERNAL FETAL MEDICINE | Facility: CLINIC | Age: 25
End: 2025-06-25

## 2025-06-25 VITALS
SYSTOLIC BLOOD PRESSURE: 129 MMHG | HEIGHT: 65 IN | HEART RATE: 89 BPM | DIASTOLIC BLOOD PRESSURE: 75 MMHG | BODY MASS INDEX: 27.76 KG/M2 | WEIGHT: 166.63 LBS

## 2025-06-25 DIAGNOSIS — Z36.89 ENCOUNTER FOR ULTRASOUND TO ASSESS FETAL GROWTH: ICD-10-CM

## 2025-06-25 DIAGNOSIS — O98.112 SYPHILIS AFFECTING PREGNANCY IN SECOND TRIMESTER: ICD-10-CM

## 2025-06-25 DIAGNOSIS — O09.299 PREGNANCY WITH POOR OBSTETRIC HISTORY: ICD-10-CM

## 2025-06-25 DIAGNOSIS — O35.BXX0 VENTRICULAR SEPTAL DEFECT (VSD) OF FETUS AFFECTING MANAGEMENT OF PREGNANCY: ICD-10-CM

## 2025-06-25 DIAGNOSIS — Z82.79 FAMILY HISTORY OF CONGENITAL HEART DEFECT: Primary | ICD-10-CM

## 2025-06-25 DIAGNOSIS — O36.5920 LIGHT FOR DATES AFFECTING MANAGEMENT OF MOTHER, SECOND TRIMESTER, NOT APPLICABLE OR UNSPECIFIED FETUS: ICD-10-CM

## 2025-06-25 DIAGNOSIS — O43.92 ABNORMAL PLACENTA AFFECTING MANAGEMENT OF MOTHER IN SECOND TRIMESTER: ICD-10-CM

## 2025-06-25 DIAGNOSIS — Z82.79 FAMILY HISTORY OF CONGENITAL HEART DEFECT: ICD-10-CM

## 2025-06-25 DIAGNOSIS — O10.012 PRE-EXISTING ESSENTIAL HYPERTENSION AFFECTING PREGNANCY IN SECOND TRIMESTER: ICD-10-CM

## 2025-06-25 DIAGNOSIS — Z36.2 ENCOUNTER FOR FOLLOW-UP ULTRASOUND OF FETAL ANATOMY: ICD-10-CM

## 2025-06-25 DIAGNOSIS — Z03.73 SUSPECTED FETAL ANOMALY NOT FOUND: ICD-10-CM

## 2025-06-25 NOTE — TELEPHONE ENCOUNTER
----- Message from Med Assistant Dahiana sent at 6/25/2025 11:47 AM CDT -----  Please call and get cell free DNA from Dr. Dennis Keenan    There is no CF DNA on file at Dr. Keenan's office per [employer].

## 2025-07-11 NOTE — PROGRESS NOTES
"  Maternal Fetal Medicine Follow Up    Subjective:     Patient ID: [id number]    Chief Complaint: [facility] follow up w/us       HPI: Izzy Palacio is a 24 y.o. female  at 29w1d gestation with Estimated Date of Delivery: 25  who is here for follow-up consultation by M.    Her last child had hypoplastic left heart syndrome.  That child unfortunately passed away at 6-month-old.  She had fetal echo on 2025 that showed small mid muscular VSD with bidirectional shown otherwise normal. Per pediatric Cardiology, patient okay for local delivery with routine care and follow-up at 1 month of age.  She had negative cell free DNA. She had gestational hypertension at the end of that pregnancy. She had elevated blood pressure at initial MFM consult at 19 weeks and is suspected to have underlying chronic hypertension.  She is currently on low dose aspirin once daily for preeclampsia prophylaxis. She had placental lakes seen on previous ultrasound. She had labs drawn on 25 as follows: platelets 273 K, , uric acid 4.0, serum creatinine 0.74, AST 13, and ALT 9. She reports that she had syphilis treated x3 weeks with the last injection late April due to history of positive syphilis antibody but negative RPR. Repeat RPR , not done.    She denies any history of infection, reports was only treated "out of caution".        Interval history since last MFM visit: None.. She denies any leaking fluid, vaginal bleeding, contractions, decreased fetal movement. Denies headaches, visual disturbances, or epigastric pain.    Pregnancy complications include:   Problem List[1]    No changes to medical, surgical, family, social, or obstetric history.    Medications:  Current Outpatient Medications   Medication Instructions    PNV no.153/FA/om3/dha/epa/fish (PRENATAL GUMMIES ORAL) Take by mouth.       Review of Systems   12 point review of systems conducted, negative except as stated in the history of present illness. " "See HPI for details.      Objective:     Visit Vitals  /75 (BP Location: Left arm, Patient Position: Sitting)   Pulse 108   Ht 5' 5" (1.651 m)   Wt 78.5 kg (173 lb)   LMP  (LMP Unknown)   BMI 28.79 kg/m²        Physical Exam  Vitals and nursing note reviewed.   Constitutional:       Appearance: Normal appearance.   HENT:      Head: Normocephalic and atraumatic.      Nose: Nose normal. No congestion.   Cardiovascular:      Rate and Rhythm: Normal rate.   Pulmonary:      Effort: Pulmonary effort is normal.   Skin:     Findings: No rash.   Neurological:      Mental Status: She is alert and oriented to person, place, and time.   Psychiatric:         Mood and Affect: Mood normal.         Behavior: Behavior normal.         Thought Content: Thought content normal.         Judgment: Judgment normal.         Assessment/Plan:     24 y.o.  female with IUP at 29w1d    History of child with CHD, current fetus with small VSD  There is low normal fetal growth with an EFW of 1148 g at the 23% and lagging AC at the 4%.  on 25.  AFV is normal, and BPP is 8/8 today (2025).   Umbilical artery Doppler is normal today (2025).      Ventricular septal defect is one of the most common congenital malformations of the heart accounting for 30% of all cardiac anomalies.      About 20% of fetuses with VSD on ultrasound have a chromosome abnormality. The presence of other anomalies on ultrasound increases the chance of an underlying genetic syndrome. VSD can be found in association with aneuploidy, particularly with Down Syndrome, Trisomy 13, Trisomy 18, and triploidy. Individuals with 22q11.21 deletion syndrome (Digeorge) are also commonly noted to have a VSD.   VSD may also be seen in several single gene disorders including, but not limited to, CHARGE, VACTERL, Greco Lemli Opitz and Calderon-Leonarda syndrome. In the absence of a chromosomal or syndromic cause, the etiology appears to be multifactorial.     She was advised " that the only diagnostic test at this time is genetic amniocentesis.  Benefits and risks of a genetic amniocentesis were discussed. Patient was offered genetic amniocentesis, after thorough counseling on benefits and risks of procedure, with very remote risk of complications and in some studies no identifiable increased risk, above background risk of spontaneous miscarriage, while some current data suggests risk up to 1 in 800 with early amniocentesis prior to 24 weeks, and remote risk of need for emergency delivery with all the complications of prematurity with amniocentesis after 24 weeks. She declined amniocentesis . She is aware of the need for  evaluation.    She was counseled on Cell free DNA understanding that it is an enhanced screening test but not a diagnostic test. It assesses risk for common aneuploidies such as Trisomy 13, 18, and 21 by evaluating cell-free fetal DNA in maternal circulation with a false positive rate less than 0.5% for Trisomy 21 and less reliable for Trisomy 13 and Trisomy 18. She had cell free DNA that was negative .     Per pediatric Cardiology, patient okay for local delivery with routine care and follow-up at 1 month of age.       Low-normal fetal growth with lagging AC  Because of range of error of ultrasound at this gestational age and possibility of more significant fetal growth restriction, a biophysical profile was done which was 8/8, and umbilical artery Doppler was done which was normal.  Out of caution, we will plan to recheck fetal growth in 3 weeks. She is to do fetal kick counts 3x/daily and PRN with immediate reporting of decreased fetal movements (<10 movements/hr).       Possible mild underlying chronic hypertension with history of gHTN  Chronic hypertension is associated with significant adverse pregnancy outcomes including  birth, fetal growth restriction, fetal demise, placental abruption, and  delivery. Based on findings of recent CHAP  Study, it is recommended to utilize 140/90 as the threshold for initiation or titration of medical therapy for chronic hypertension in pregnancy, rather than the previously recommended threshold of 160/110. For patients on blood pressure medications at the start of pregnancy, in the absence of mitigating factors or side effects, they can be maintained on their medications, rather than discontinuing them and waiting to initiate treatment for blood pressures in the severe range.    Vitals:    25 1117   BP: 125/75     With this BP, she was advised to follow low sodium diet with no medication at this time.     Low dose aspirin as discussed.    She would benefit from follow-up ultrasounds to monitor growth in 3 weeks and then every 4 weeks until the end of pregnancy (if normal fetal growth on follow-up). Recommend fetal testing starting around 32 weeks gestation until the end of pregnancy.    Among patients with uncomplicated chronic hypertension with no additional risk factors, delivery at 38-39 weeks appears to provide optimal balance between the risk of adverse fetal and  outcomes. If no medication and normal fetal assessment, recommend delivery at 39 weeks. However, will reassess closer to EDC to determine optimal timeframe or GA for delivery, based on current evaluation at that time.     Recommend close postpartum followup with Primary OB as well as PCP for ongoing evaluation/treatment of HTN.        Placental lakes  Placental lakes are enlarged spaces in the placenta filled with maternal blood and are considered to be a normal finding in most cases. However, multiple placental lakes may increase risk for fetal growth restriction.  We will plan to recheck fetal growth with ultrasound as above. Expectant management.       Positive syphilis antibody, negative RPR  Patient adamantly denies any history of syphilis infection.  Discussed with the patient with positive antibody and negative RPR in  in  2025, could possibly be false-positive or reflective of previous exposure to syphilis.  She has had adequate treatment with penicillin x3 weeks.  However, with her hesitant to discuss with her partner, emphasized to her, although difficult conversation, it will be of utmost importance to have her partner tested to ensure that he is negative for syphilis.  If he is positive, then she is at risk for reinfection.  Follow up RPR ordered previously and was not done.  Patient states she will do today on 07/16/2025.  Consider to repeat around 34-36 weeks.  If stays nonreactive, no retreatment is indicated.  If any for fold increase, may need retreatment.  Follow follow up scheduled for RPR as posttreatment per CDC schedule postpartum.    Continued low-normal fetal growth with a reassuring BPP and umbilical artery Doppler.  We will plan to rechecked again in 3 weeks.  Fetal kick count instructions and preeclampsia warnings.  Continue daily aspirin.  We will do RPR today.      Follow up in about 3 weeks (around 8/6/2025) for MFM follow-up, Repeat ultrasound, BPP.     No future appointments.     SCRIBE #1 NOTE: I, Agatha Smyth, am scribing for, and in the presence of,  Samuel Pollard MD. I have scribed the following portions of the note - Other sections scribed: HPI and Assessment/Plan.       This note was created with the assistance of [employer] voice recognition software. There may be transcription errors as a result of using this technology, however minimal. Effort has been made to ensure accuracy of transcription, but any obvious errors or omissions should be clarified with the author of the document.           [1]   Patient Active Problem List  Diagnosis    Light-for-dates affecting management of mother, third trimester, not applicable or unspecified fetus    Ventricular septal defect (VSD) of fetus affecting management of pregnancy    Pregnancy with poor obstetric history    Family history of congenital heart defect     Syphilis affecting pregnancy    Placental Peng affecting management of mother in third trimester    Pre-existing essential hypertension affecting pregnancy in second trimester

## 2025-07-16 ENCOUNTER — PROCEDURE VISIT (OUTPATIENT)
Dept: MATERNAL FETAL MEDICINE | Facility: CLINIC | Age: 25
End: 2025-07-16
Payer: MEDICAID

## 2025-07-16 ENCOUNTER — OFFICE VISIT (OUTPATIENT)
Dept: MATERNAL FETAL MEDICINE | Facility: CLINIC | Age: 25
End: 2025-07-16
Payer: MEDICAID

## 2025-07-16 ENCOUNTER — HOSPITAL ENCOUNTER (OUTPATIENT)
Facility: HOSPITAL | Age: 25
Discharge: HOME | End: 2025-07-16
Attending: OBSTETRICS & GYNECOLOGY | Admitting: OBSTETRICS & GYNECOLOGY
Payer: MEDICAID

## 2025-07-16 VITALS
HEIGHT: 65 IN | SYSTOLIC BLOOD PRESSURE: 125 MMHG | HEART RATE: 108 BPM | WEIGHT: 173 LBS | BODY MASS INDEX: 28.82 KG/M2 | DIASTOLIC BLOOD PRESSURE: 75 MMHG

## 2025-07-16 DIAGNOSIS — O09.299 PREGNANCY WITH POOR OBSTETRIC HISTORY: ICD-10-CM

## 2025-07-16 DIAGNOSIS — O98.113 SYPHILIS AFFECTING PREGNANCY IN THIRD TRIMESTER: ICD-10-CM

## 2025-07-16 DIAGNOSIS — O43.93 ABNORMAL PLACENTA AFFECTING MANAGEMENT OF MOTHER IN THIRD TRIMESTER: ICD-10-CM

## 2025-07-16 DIAGNOSIS — O35.BXX0 VENTRICULAR SEPTAL DEFECT (VSD) OF FETUS AFFECTING MANAGEMENT OF PREGNANCY: Primary | ICD-10-CM

## 2025-07-16 DIAGNOSIS — Z82.79 FAMILY HISTORY OF CONGENITAL HEART DEFECT: ICD-10-CM

## 2025-07-16 DIAGNOSIS — A53.9 SYPHILIS: ICD-10-CM

## 2025-07-16 DIAGNOSIS — Z36.89 ENCOUNTER FOR ULTRASOUND TO ASSESS FETAL GROWTH: ICD-10-CM

## 2025-07-16 DIAGNOSIS — O36.5930: ICD-10-CM

## 2025-07-16 PROBLEM — O13.3 GESTATIONAL HYPERTENSION, THIRD TRIMESTER: Status: ACTIVE | Noted: 2025-07-16

## 2025-07-16 PROCEDURE — 76820 UMBILICAL ARTERY ECHO: CPT | Mod: S$GLB,,, | Performed by: OBSTETRICS & GYNECOLOGY

## 2025-07-16 PROCEDURE — 96372 THER/PROPH/DIAG INJ SC/IM: CPT | Performed by: OBSTETRICS & GYNECOLOGY

## 2025-07-16 PROCEDURE — 76819 FETAL BIOPHYS PROFIL W/O NST: CPT | Mod: S$GLB,,, | Performed by: OBSTETRICS & GYNECOLOGY

## 2025-07-16 PROCEDURE — 3074F SYST BP LT 130 MM HG: CPT | Mod: CPTII,S$GLB,, | Performed by: OBSTETRICS & GYNECOLOGY

## 2025-07-16 PROCEDURE — 3078F DIAST BP <80 MM HG: CPT | Mod: CPTII,S$GLB,, | Performed by: OBSTETRICS & GYNECOLOGY

## 2025-07-16 PROCEDURE — 76816 OB US FOLLOW-UP PER FETUS: CPT | Mod: S$GLB,,, | Performed by: OBSTETRICS & GYNECOLOGY

## 2025-07-16 PROCEDURE — 1159F MED LIST DOCD IN RCRD: CPT | Mod: CPTII,S$GLB,, | Performed by: OBSTETRICS & GYNECOLOGY

## 2025-07-16 PROCEDURE — 63600175 PHARM REV CODE 636 W HCPCS: Mod: JZ,TB | Performed by: OBSTETRICS & GYNECOLOGY

## 2025-07-16 PROCEDURE — 3008F BODY MASS INDEX DOCD: CPT | Mod: CPTII,S$GLB,, | Performed by: OBSTETRICS & GYNECOLOGY

## 2025-07-16 PROCEDURE — 1160F RVW MEDS BY RX/DR IN RCRD: CPT | Mod: CPTII,S$GLB,, | Performed by: OBSTETRICS & GYNECOLOGY

## 2025-07-16 PROCEDURE — 99213 OFFICE O/P EST LOW 20 MIN: CPT | Mod: TH,S$GLB,, | Performed by: OBSTETRICS & GYNECOLOGY

## 2025-07-16 RX ADMIN — PENICILLIN G BENZATHINE 2.4 MILLION UNITS: 1200000 INJECTION, SUSPENSION INTRAMUSCULAR at 01:07

## 2025-07-16 NOTE — DISCHARGE INSTRUCTIONS
Return to hospital for second dose of Bicillin 7/23 at 8:00am  and third dose of Bicillin 7/30 at 8:00am.    Keep regularly scheduled appointments. Return to hospital for vaginal bleeding, leaking fluid, regular contraction, decreased fetal movement.

## 2025-07-19 ENCOUNTER — HOSPITAL ENCOUNTER (EMERGENCY)
Facility: HOSPITAL | Age: 25
Discharge: HOME | End: 2025-07-19
Attending: EMERGENCY MEDICINE
Payer: MEDICAID

## 2025-07-19 VITALS
DIASTOLIC BLOOD PRESSURE: 77 MMHG | HEART RATE: 107 BPM | TEMPERATURE: 98 F | OXYGEN SATURATION: 96 % | SYSTOLIC BLOOD PRESSURE: 123 MMHG | WEIGHT: 168.63 LBS | HEIGHT: 65 IN | RESPIRATION RATE: 18 BRPM | BODY MASS INDEX: 28.1 KG/M2

## 2025-07-19 DIAGNOSIS — R10.9 ABDOMINAL PAIN AFFECTING PREGNANCY: Primary | ICD-10-CM

## 2025-07-19 DIAGNOSIS — O26.899 ABDOMINAL PAIN AFFECTING PREGNANCY: Primary | ICD-10-CM

## 2025-07-19 PROCEDURE — 99282 EMERGENCY DEPT VISIT SF MDM: CPT

## 2025-07-19 NOTE — Clinical Note
"Izzy Nunn" Hakeem was seen and treated in our emergency department on 7/19/2025.  She may return to work on 07/21/2025.       If you have any questions or concerns, please don't hesitate to call.      Oswaldo WILKINSON    "

## 2025-07-19 NOTE — ED PROVIDER NOTES
Encounter Date: 7/19/2025       History     Chief Complaint   Patient presents with    Abdominal Pain     Pt is 29 weeks pregnant. Reports lower abd pain/cramps that started this morning. Denies vaginal bleeding.      Patient presents today reporting an episode of lower abdominal cramping earlier in the day that lasted 15-20 minutes, has not recurred.  No loss of fluid per vagina, no vaginal bleeding, no change in pattern of fetal movement.        Review of patient's allergies indicates:  No Known Allergies  Past Medical History:   Diagnosis Date    Anemia 2023    NOT SURE IF CURRENT OR NOT    Syphilis, unspecified      Past Surgical History:   Procedure Laterality Date    LAPAROTOMY, EXPLORATORY N/A 02/19/2023    Procedure: Ex Laparatomy, Right Salpingectomy, Evacuation of Hemoperitoneum;  Surgeon: Shayy Barksdale MD;  Location: [facility];  Service: OB/GYN;  Laterality: N/A;    SALPINGECTOMY N/A 02/19/2023    Procedure: SALPINGECTOMY;  Surgeon: Shayy Barksdale MD;  Location: [facility] OR;  Service: OB/GYN;  Laterality: N/A;     Family History   Problem Relation Name Age of Onset    Diabetes Paternal Grandmother      Stroke Maternal Grandmother      Hypertension Maternal Grandmother      Diabetes Maternal Grandfather       Social History[1]  Review of Systems    Physical Exam     Initial Vitals [07/19/25 1457]   BP Pulse Resp Temp SpO2   123/77 107 18 98.2 °F (36.8 °C) 96 %      MAP       --         Physical Exam    Nursing note and vitals reviewed.  Constitutional: She appears well-developed and well-nourished. She is not diaphoretic. No distress.   HENT:   Head: Normocephalic and atraumatic.   Right Ear: External ear normal.   Left Ear: External ear normal.   Eyes: EOM are normal. Pupils are equal, round, and reactive to light. Right eye exhibits no discharge. Left eye exhibits no discharge.   Neck: Neck supple. No thyromegaly present. No tracheal deviation present. No JVD present.   Normal range of  motion.  Cardiovascular:  Normal rate, regular rhythm, normal heart sounds and intact distal pulses.     Exam reveals no gallop and no friction rub.       No murmur heard.  Pulmonary/Chest: Breath sounds normal. No stridor. No respiratory distress. She has no wheezes. She has no rhonchi. She has no rales.   Abdominal: Abdomen is soft. Bowel sounds are normal. She exhibits no distension.   Gravid uterus, compatible with dates; There is no rebound and no guarding.   Musculoskeletal:         General: No tenderness or edema. Normal range of motion.      Cervical back: Normal range of motion and neck supple.     Neurological: She is alert and oriented to person, place, and time. She has normal strength. No cranial nerve deficit or sensory deficit. GCS score is 15. GCS eye subscore is 4. GCS verbal subscore is 5. GCS motor subscore is 6.   Skin: Skin is warm and dry. Capillary refill takes less than 2 seconds. No rash and no abscess noted. No erythema. No pallor.   Psychiatric: She has a normal mood and affect. Her behavior is normal. Judgment and thought content normal.         ED Course   Procedures  Labs Reviewed - No data to display       Imaging Results    None          Medications - No data to display  Medical Decision Making  Amount and/or Complexity of Data Reviewed  External Data Reviewed: notes.    Risk  Risk Details: Patient with an episode of abdominal pain that has spontaneously resolved.  Patient with normal fetal heart tones today.  Patient at 30 weeks gestation, plans delivery at [facility].  No features concerning for an emergent process today and pain resolved and remains resolved.  She is discharged in stable condition with anticipatory guidance, return precautions, follow-up instructions.                                          Clinical Impression:  Final diagnoses:  [O26.899, R10.9] Abdominal pain affecting pregnancy (Primary)          ED Disposition Condition    Discharge Stable          ED  Prescriptions    None       Follow-up Information       Follow up With Specialties Details Why Contact Info    [facility] - Emergency Dept Emergency Medicine  As needed, If symptoms worsen [address] [facility] [address] [phone]    Kristine Wellington MD Family Medicine Call   [address]  [place] [address] [phone]                     [1]   Social History  Tobacco Use    Smoking status: Never     Passive exposure: Never    Smokeless tobacco: Never   Substance Use Topics    Alcohol use: Not Currently    Drug use: Never        TheodSlim schulte MD  07/19/25 2013

## 2025-07-22 DIAGNOSIS — O98.113 SYPHILIS AFFECTING PREGNANCY IN THIRD TRIMESTER: ICD-10-CM

## 2025-07-22 DIAGNOSIS — Z36.89 ENCOUNTER FOR ULTRASOUND TO ASSESS FETAL GROWTH: ICD-10-CM

## 2025-07-22 DIAGNOSIS — Z82.79 FAMILY HISTORY OF CONGENITAL HEART DEFECT: Primary | ICD-10-CM

## 2025-07-23 ENCOUNTER — HOSPITAL ENCOUNTER (OUTPATIENT)
Facility: HOSPITAL | Age: 25
Discharge: HOME | End: 2025-07-23
Attending: OBSTETRICS & GYNECOLOGY | Admitting: OBSTETRICS & GYNECOLOGY
Payer: MEDICAID

## 2025-07-23 DIAGNOSIS — A53.9 SYPHILIS: ICD-10-CM

## 2025-07-23 PROCEDURE — 96372 THER/PROPH/DIAG INJ SC/IM: CPT | Performed by: OBSTETRICS & GYNECOLOGY

## 2025-07-23 PROCEDURE — 63600175 PHARM REV CODE 636 W HCPCS: Mod: JZ,TB | Performed by: OBSTETRICS & GYNECOLOGY

## 2025-07-23 RX ADMIN — PENICILLIN G BENZATHINE 2.4 MILLION UNITS: 1200000 INJECTION, SUSPENSION INTRAMUSCULAR at 03:07

## 2025-07-23 NOTE — DISCHARGE INSTRUCTIONS
Keep regularly scheduled appointments. Return to hospital for vaginal bleeding, leaking fluid, regular contraction, decreased fetal movement.       Return to hospital next Wednesday 7/30 for final dose of bicillin.

## 2025-07-25 ENCOUNTER — TELEPHONE (OUTPATIENT)
Dept: MATERNAL FETAL MEDICINE | Facility: CLINIC | Age: 25
End: 2025-07-25
Payer: MEDICAID

## 2025-07-25 NOTE — TELEPHONE ENCOUNTER
Called Pt regarding an overdue lab. Pt stated that she will have it done before her next appointment.

## 2025-07-31 ENCOUNTER — HOSPITAL ENCOUNTER (OUTPATIENT)
Facility: HOSPITAL | Age: 25
Discharge: HOME | End: 2025-07-31
Attending: OBSTETRICS & GYNECOLOGY | Admitting: OBSTETRICS & GYNECOLOGY
Payer: MEDICAID

## 2025-07-31 PROCEDURE — 96372 THER/PROPH/DIAG INJ SC/IM: CPT | Performed by: OBSTETRICS & GYNECOLOGY

## 2025-07-31 PROCEDURE — 59025 FETAL NON-STRESS TEST: CPT

## 2025-07-31 PROCEDURE — 63600175 PHARM REV CODE 636 W HCPCS: Mod: JZ,TB | Performed by: OBSTETRICS & GYNECOLOGY

## 2025-07-31 RX ADMIN — PENICILLIN G BENZATHINE 2.4 MILLION UNITS: 1200000 INJECTION, SUSPENSION INTRAMUSCULAR at 02:07

## 2025-08-01 ENCOUNTER — LAB VISIT (OUTPATIENT)
Dept: LAB | Facility: HOSPITAL | Age: 25
End: 2025-08-01
Attending: OBSTETRICS & GYNECOLOGY
Payer: MEDICAID

## 2025-08-01 DIAGNOSIS — O98.112 SYPHILIS AFFECTING PREGNANCY IN SECOND TRIMESTER: ICD-10-CM

## 2025-08-01 LAB
RPR SER QL: NORMAL
T PALLIDUM AB SER QL: REACTIVE

## 2025-08-01 PROCEDURE — 36415 COLL VENOUS BLD VENIPUNCTURE: CPT

## 2025-08-01 PROCEDURE — 86780 TREPONEMA PALLIDUM: CPT

## 2025-08-01 PROCEDURE — 86780 TREPONEMA PALLIDUM: CPT | Mod: 59

## 2025-08-01 PROCEDURE — 86592 SYPHILIS TEST NON-TREP QUAL: CPT

## 2025-08-04 NOTE — PROGRESS NOTES
"  Maternal Fetal Medicine Follow Up    Subjective:     Patient ID: [id number]    Chief Complaint: [facility] Followup with U/S      HPI: Izzy Palacio is a 24 y.o. female  at 32w1d gestation with Estimated Date of Delivery: 25  who is here for follow-up consultation by MFM.    Her last child had hypoplastic left heart syndrome.  That child unfortunately passed away at 6-month-old.  She had fetal echo on 2025 that showed small mid muscular VSD with bidirectional shown otherwise normal. Per pediatric Cardiology, patient okay for local delivery with routine care and follow-up at 1 month of age.  She had negative cell free DNA. She had gestational hypertension at the end of that pregnancy. She had elevated blood pressure at initial MFM consult at 19 weeks and is suspected to have underlying chronic hypertension.  She is currently on low dose aspirin once daily for preeclampsia prophylaxis. She had placental lakes seen on previous ultrasound. She had labs drawn on 25 as follows: platelets 273 K, , uric acid 4.0, serum creatinine 0.74, AST 13, and ALT 9. She reports that she had syphilis treated x3 weeks with the last injection late April due to history of positive syphilis antibody but negative RPR. Repeat RPR nonreactive on 25.    She denies any history of infection, reports was only treated "out of caution". She had low normal fetal growth with lagging AC seen on previous ultrasound.       Interval history since last MFM visit: None.. She denies any leaking fluid, vaginal bleeding, contractions, decreased fetal movement. Denies headaches, visual disturbances, or epigastric pain.    Pregnancy complications include:   Problem List[1]    No changes to medical, surgical, family, social, or obstetric history.    Medications:  Current Outpatient Medications   Medication Instructions    aspirin 81 mg, Daily    PNV no.153/FA/om3/dha/epa/fish (PRENATAL GUMMIES ORAL) Take by mouth.       Review of " "Systems   12 point review of systems conducted, negative except as stated in the history of present illness. See HPI for details.      Objective:     Visit Vitals  /72 (BP Location: Left arm, Patient Position: Sitting)   Pulse 95   Resp 18   Ht 5' 5" (1.651 m)   Wt 79.9 kg (176 lb 1.6 oz)   LMP  (LMP Unknown)   BMI 29.30 kg/m²        Physical Exam  Vitals and nursing note reviewed.   Constitutional:       Appearance: Normal appearance.   HENT:      Head: Normocephalic and atraumatic.      Nose: Nose normal. No congestion.   Cardiovascular:      Rate and Rhythm: Normal rate.   Pulmonary:      Effort: Pulmonary effort is normal.   Skin:     Findings: No rash.   Neurological:      Mental Status: She is alert and oriented to person, place, and time.   Psychiatric:         Mood and Affect: Mood normal.         Behavior: Behavior normal.         Thought Content: Thought content normal.         Judgment: Judgment normal.         Assessment/Plan:     24 y.o.  female with IUP at 32w1d    History of child with CHD, current fetus with small VSD  Mild fetal growth restriction with EFW 1641 g at 14%, AC at 7% on 25.  AFV is normal. BPP is 8/8 today (2025).     She had negative cell free DNA.  She declined amniocentesis . She is aware of the need for  evaluation.     Per pediatric Cardiology, patient okay for local delivery with routine care and follow-up at 1 month of age.       Mild fetal growth restriction  I discussed potential etiologies of FGR include but are not limited to normal variation of stature, placental insufficiency, chromosomal abnormalities, genetic disorders, infections, medical conditions, teratogen exposure and other etiologies.  We discussed the increased risk of stillbirth and the potential need for earlier delivery.The potential for constitutional factor as a contributing factor was addressed in addition to other contributing factors such as conditions predisposing to vascular " disease such as pregestational diabetes, chronic renal disease, autoimmune disorders; umbilical cord abnormalities; substance use; and multiple gestation. Although uteroplacental insufficiency and/or constitutional factors (if relevant) are the likely etiology, the potential for anomalies not seen with the ultrasound, aneuploidy, or in-utero viral infections are there, but these are very unlikely to be the cause with no other ultrasound findings. Based on a study in 2018, there is an abnormal microarray in 3% of isolated FGR. I discussed and offered genetic amniocentesis, to check for microarray and CMV was offered. The benefits and risks were discussed. She declined amniocentesis . She was advised of need for  evaluation.    She was counseled on Cell free DNA understanding that it is an enhanced screening test but not a diagnostic test. It assesses risk for common aneuploidies such as Trisomy 13, 18, and 21 by evaluating cell-free fetal DNA in maternal circulation with a false positive rate less than 0.5% for Trisomy 21 and less reliable for Trisomy 13 and Trisomy 18. She had cell free DNA that was negative .    Complications of growth-restricted neonates include hypoglycemia, hyperbilirubinemia, hypothermia, seizures, sepsis, IVH, RDS, necrotizing enterocolitis, and  asphyxia. Long-term complications include cognitive delay and adult diseases such as cardiovascular disorders and type 2 diabetes. There is an increased risk (~20%) for recurrence of fetal growth restriction in a future pregnancy.    Because of increased risk of stillbirth, she was advised that in this situation we need to monitor interval fetal growth every 3 weeks and do twice weekly fetal testing, including an NST at least once per week. She was instructed that fetal testing is not a 100% protective against the risk of fetal demise in-utero. The importance of doing fetal kick counts three times a day and resting in a lateral  recumbent position and reporting immediately at any time there is any decreased fetal movement even if the same day of testing was emphasized. Her questions were answered.    Delivery is not indicated at this time, as risks of  mortality and morbidity with delivery, outweigh risks with continued pregnancy. Likewise, with stable condition, or too early a gestational age, steroids (and magnesium sulfate) are not recommended, as benefit is reaped only if suspected imminent delivery in few days which, although possible, is very unlikely at this time.     With chronic hypertension and mild fetal growth restriction,  recommend delivery at 37 weeks' gestation (37-37 6/7th weeks) as optimal balance between prematurity risks and risks of continued pregnancy. Earlier delivery may be needed for worsening maternal or fetal status.       Possible mild underlying chronic hypertension with history of gHTN  Chronic hypertension is associated with significant adverse pregnancy outcomes including  birth, fetal growth restriction, fetal demise, placental abruption, and  delivery. Based on findings of recent CHAP Study, it is recommended to utilize 140/90 as the threshold for initiation or titration of medical therapy for chronic hypertension in pregnancy, rather than the previously recommended threshold of 160/110. For patients on blood pressure medications at the start of pregnancy, in the absence of mitigating factors or side effects, they can be maintained on their medications, rather than discontinuing them and waiting to initiate treatment for blood pressures in the severe range.    Vitals:    25 1149   BP: 136/72       With this BP,  follow low sodium diet with no medication at this time.     Low dose aspirin as discussed.    Fetal surveillance as above.    Delivery timing as above.    Recommend close postpartum followup with Primary OB as well as PCP for ongoing evaluation/treatment of HTN.         Placental lakes  Placental lakes are enlarged spaces in the placenta filled with maternal blood and are considered to be a normal finding in most cases. However, multiple placental lakes may increase risk for fetal growth restriction.  We will plan to recheck fetal growth with ultrasound as above. Expectant management.       Positive syphilis antibody, negative RPR  Patient adamantly denies any history of syphilis infection.  Discussed with the patient with positive antibody and negative RPR in 2023 in 2025, could possibly be false-positive or reflective of previous exposure to syphilis.  She has had adequate treatment with penicillin x3 weeks.  However, with her hesitant to discuss with her partner, emphasized to her, although difficult conversation, it will be of utmost importance to have her partner tested to ensure that he is negative for syphilis.  If he is positive, then she is at risk for reinfection.  Follow up RPR non reactive on 8/1/25.  Consider to repeat around 34-36 weeks.  If stays nonreactive, no retreatment is indicated.  If any for fold increase, may need retreatment.  Follow up scheduled for RPR as posttreatment per CDC schedule postpartum.      Follow up in about 1 week (around 8/13/2025) for [facility] Follow-Up, BPP, UAD, Thursday [place].     Future Appointments   Date Time Provider Department Center   8/14/2025  3:30 PM Camille Mello WHNP [facility] [place]   8/14/2025  3:30 PM ROOM 2, [facility] [place]        SCRIBE #1 NOTE: I, Agatha Smyth, am scribing for, and in the presence of,  Samuel Pollard MD. I have scribed the following portions of the note - Other sections scribed: HPI and Assessment/Plan.       This note was created with the assistance of [employer] voice recognition software. There may be transcription errors as a result of using this technology, however minimal. Effort has been made to ensure accuracy of transcription, but any obvious errors or omissions  should be clarified with the author of the document.             [1]   Patient Active Problem List  Diagnosis    Fetal growth restriction antepartum    Ventricular septal defect (VSD) of fetus affecting management of pregnancy    Pregnancy with poor obstetric history    Family history of congenital heart defect    Syphilis affecting pregnancy    Placental Peng affecting management of mother in third trimester    Pre-existing essential hypertension affecting pregnancy in third trimester

## 2025-08-05 ENCOUNTER — HOSPITAL ENCOUNTER (EMERGENCY)
Facility: HOSPITAL | Age: 25
Discharge: HOME | End: 2025-08-05
Attending: SPECIALIST
Payer: MEDICAID

## 2025-08-05 VITALS
RESPIRATION RATE: 16 BRPM | SYSTOLIC BLOOD PRESSURE: 130 MMHG | HEART RATE: 90 BPM | DIASTOLIC BLOOD PRESSURE: 72 MMHG | TEMPERATURE: 99 F

## 2025-08-05 PROCEDURE — 99284 EMERGENCY DEPT VISIT MOD MDM: CPT

## 2025-08-05 RX ORDER — NAPROXEN SODIUM 220 MG/1
81 TABLET, FILM COATED ORAL DAILY
COMMUNITY

## 2025-08-05 NOTE — ED PROVIDER NOTES
JT NOTE     Admit Date: 2025  JT Physician: Duy Baker  Primary OBGYN: Dr. Dennis Keenan    Chief Complaint   Patient presents with    Abdominal Pain     Pt states she is cece every 7-8 minutes       Hospital Course:  Izzy Palacio is a 24 y.o.  at 32w0d presents complaining of regular uterine contractions.  Patient denies any other problems at this time.    This IUP is complicated by VS D of fetus in pregnancy.  Syphilis affecting pregnancy.  History of gestational hypertension.    Patient denies vaginal bleeding, leakage of fluid, headache, vision changes, RUQ pain, dysuria, fever, and nausea/vomiting.  Fetal Movement: normal.      /64   Pulse 90   Temp 98.8 °F (37.1 °C) (Oral)   Resp 16   LMP  (LMP Unknown)   Temp:  [98.8 °F (37.1 °C)] 98.8 °F (37.1 °C)  Pulse:  [] 90  Resp:  [16] 16  BP: (123-130)/(64-79) 123/64    General: in no apparent distress  Cardiovascular: regular rate and rhythm no murmurs  Respiratory: clear to auscultation, no wheezes, rales or rhonchi, symmetric air entry  Abdominal: FHT present  Back: lumbar tenderness present CVA tenderness none  Extremeties no redness or tenderness in the calves or thighs no edema    SSE:   SVE:  Deferred      FHT: Category 1  TOCO:  No contractions are noted    Medical Decision Making:  The patient was not having any uterine contractions.  Signs and symptoms of active labor or  labor versus Drew Lopez contractions were discussed.  Normal 3rd trimester pregnancy symptoms were also discussed.  Patient will follow up with her primary OB service.    LABS:   No results found for this or any previous visit (from the past 24 hours).  [unfilled]     Imaging Results    None          ASSESMENT: Izzy Palacio is a 24 y.o.   at 32w0d with history of irregular uterine contractions consistent with Drew Lopez.  Now resolved.    Discharge Diagnosis/Clinical Impression:  Pregnancy 32 weeks.   Uterine inertia.    Status:Improved/stable    Disposition:  discharged to home    Medications:   Tylenol for discomfort    Patient Instructions:   Current Discharge Medication List        CONTINUE these medications which have NOT CHANGED    Details   aspirin 81 MG Chew Take 81 mg by mouth once daily.      PNV no.153/FA/om3/dha/epa/fish (PRENATAL GUMMIES ORAL) Take by mouth.             - Pt was given routine pregnancy instructions including to return to triage if she had any vaginal bleeding (other than spotting for the next 48hrs), any loss of fluid like her water broke, decreased fetal movement, or contractions Q 5min lasting for 2 or more hours. Pt was also instructed to drink copious water. Patient voiced understanding of all these instructions and was subsequently discharged home.    She will follow up with her primary OB.    No discharge procedures on file.    Duy Baker MD  OB-GYN Hospitalist       Duy Baker MD  08/05/25 [id number]

## 2025-08-06 ENCOUNTER — OFFICE VISIT (OUTPATIENT)
Dept: MATERNAL FETAL MEDICINE | Facility: CLINIC | Age: 25
End: 2025-08-06
Payer: MEDICAID

## 2025-08-06 ENCOUNTER — PROCEDURE VISIT (OUTPATIENT)
Dept: MATERNAL FETAL MEDICINE | Facility: CLINIC | Age: 25
End: 2025-08-06
Payer: MEDICAID

## 2025-08-06 VITALS
SYSTOLIC BLOOD PRESSURE: 136 MMHG | HEART RATE: 95 BPM | HEIGHT: 65 IN | DIASTOLIC BLOOD PRESSURE: 72 MMHG | WEIGHT: 176.13 LBS | BODY MASS INDEX: 29.34 KG/M2 | RESPIRATION RATE: 18 BRPM

## 2025-08-06 DIAGNOSIS — O98.113 SYPHILIS AFFECTING PREGNANCY IN THIRD TRIMESTER: ICD-10-CM

## 2025-08-06 DIAGNOSIS — O35.BXX0 VENTRICULAR SEPTAL DEFECT (VSD) OF FETUS AFFECTING MANAGEMENT OF PREGNANCY: ICD-10-CM

## 2025-08-06 DIAGNOSIS — O43.93 ABNORMAL PLACENTA AFFECTING MANAGEMENT OF MOTHER IN THIRD TRIMESTER: ICD-10-CM

## 2025-08-06 DIAGNOSIS — Z36.89 ENCOUNTER FOR ULTRASOUND TO ASSESS FETAL GROWTH: ICD-10-CM

## 2025-08-06 DIAGNOSIS — Z82.79 FAMILY HISTORY OF CONGENITAL HEART DEFECT: ICD-10-CM

## 2025-08-06 DIAGNOSIS — O09.299 PREGNANCY WITH POOR OBSTETRIC HISTORY: ICD-10-CM

## 2025-08-06 DIAGNOSIS — O10.013 PRE-EXISTING ESSENTIAL HYPERTENSION AFFECTING PREGNANCY IN THIRD TRIMESTER: ICD-10-CM

## 2025-08-06 DIAGNOSIS — O36.5990 FETAL GROWTH RESTRICTION ANTEPARTUM: ICD-10-CM

## 2025-08-06 DIAGNOSIS — O10.013 PRE-EXISTING ESSENTIAL HYPERTENSION AFFECTING PREGNANCY IN THIRD TRIMESTER: Primary | ICD-10-CM

## 2025-08-06 DIAGNOSIS — Z82.79 FAMILY HISTORY OF CONGENITAL HEART DEFECT: Primary | ICD-10-CM

## 2025-08-06 PROBLEM — O13.3 GESTATIONAL HYPERTENSION, THIRD TRIMESTER: Status: RESOLVED | Noted: 2025-07-16 | Resolved: 2025-08-06

## 2025-08-06 LAB — T PALLIDUM AB SER QL AGGL: POSITIVE

## 2025-08-14 ENCOUNTER — PROCEDURE VISIT (OUTPATIENT)
Dept: MATERNAL FETAL MEDICINE | Facility: CLINIC | Age: 25
End: 2025-08-14
Payer: MEDICAID

## 2025-08-14 ENCOUNTER — HOSPITAL ENCOUNTER (EMERGENCY)
Facility: HOSPITAL | Age: 25
Discharge: HOME | End: 2025-08-14
Attending: SPECIALIST
Payer: MEDICAID

## 2025-08-14 ENCOUNTER — OFFICE VISIT (OUTPATIENT)
Dept: MATERNAL FETAL MEDICINE | Facility: CLINIC | Age: 25
End: 2025-08-14
Payer: MEDICAID

## 2025-08-14 VITALS
SYSTOLIC BLOOD PRESSURE: 133 MMHG | WEIGHT: 177.69 LBS | HEART RATE: 91 BPM | DIASTOLIC BLOOD PRESSURE: 70 MMHG | BODY MASS INDEX: 29.61 KG/M2 | HEIGHT: 65 IN

## 2025-08-14 VITALS
DIASTOLIC BLOOD PRESSURE: 56 MMHG | TEMPERATURE: 98 F | HEART RATE: 93 BPM | RESPIRATION RATE: 18 BRPM | OXYGEN SATURATION: 97 % | SYSTOLIC BLOOD PRESSURE: 118 MMHG

## 2025-08-14 DIAGNOSIS — O98.113 SYPHILIS AFFECTING PREGNANCY IN THIRD TRIMESTER: ICD-10-CM

## 2025-08-14 DIAGNOSIS — O10.013 PRE-EXISTING ESSENTIAL HYPERTENSION AFFECTING PREGNANCY IN THIRD TRIMESTER: ICD-10-CM

## 2025-08-14 DIAGNOSIS — Z82.79 FAMILY HISTORY OF CONGENITAL HEART DEFECT: Primary | ICD-10-CM

## 2025-08-14 DIAGNOSIS — O09.299 PREGNANCY WITH POOR OBSTETRIC HISTORY: ICD-10-CM

## 2025-08-14 DIAGNOSIS — O36.5990 FETAL GROWTH RESTRICTION ANTEPARTUM: ICD-10-CM

## 2025-08-14 DIAGNOSIS — O35.BXX0 VENTRICULAR SEPTAL DEFECT (VSD) OF FETUS AFFECTING MANAGEMENT OF PREGNANCY: ICD-10-CM

## 2025-08-14 DIAGNOSIS — Z82.79 FAMILY HISTORY OF CONGENITAL HEART DEFECT: ICD-10-CM

## 2025-08-14 PROCEDURE — 1160F RVW MEDS BY RX/DR IN RCRD: CPT | Mod: CPTII,S$GLB,, | Performed by: NURSE PRACTITIONER

## 2025-08-14 PROCEDURE — 76819 FETAL BIOPHYS PROFIL W/O NST: CPT | Mod: S$GLB,,, | Performed by: OBSTETRICS & GYNECOLOGY

## 2025-08-14 PROCEDURE — 3078F DIAST BP <80 MM HG: CPT | Mod: CPTII,S$GLB,, | Performed by: NURSE PRACTITIONER

## 2025-08-14 PROCEDURE — 3008F BODY MASS INDEX DOCD: CPT | Mod: CPTII,S$GLB,, | Performed by: NURSE PRACTITIONER

## 2025-08-14 PROCEDURE — 99213 OFFICE O/P EST LOW 20 MIN: CPT | Mod: TH,S$GLB,, | Performed by: NURSE PRACTITIONER

## 2025-08-14 PROCEDURE — 3075F SYST BP GE 130 - 139MM HG: CPT | Mod: CPTII,S$GLB,, | Performed by: NURSE PRACTITIONER

## 2025-08-14 PROCEDURE — 1159F MED LIST DOCD IN RCRD: CPT | Mod: CPTII,S$GLB,, | Performed by: NURSE PRACTITIONER

## 2025-08-14 PROCEDURE — 76820 UMBILICAL ARTERY ECHO: CPT | Mod: S$GLB,,, | Performed by: OBSTETRICS & GYNECOLOGY

## 2025-08-14 PROCEDURE — 99284 EMERGENCY DEPT VISIT MOD MDM: CPT

## 2025-08-15 DIAGNOSIS — O10.013 PRE-EXISTING ESSENTIAL HYPERTENSION AFFECTING PREGNANCY IN THIRD TRIMESTER: ICD-10-CM

## 2025-08-15 DIAGNOSIS — Z82.79 FAMILY HISTORY OF CONGENITAL HEART DEFECT: Primary | ICD-10-CM

## 2025-08-15 DIAGNOSIS — O98.113 SYPHILIS AFFECTING PREGNANCY IN THIRD TRIMESTER: ICD-10-CM

## 2025-08-15 DIAGNOSIS — O36.5990 FETAL GROWTH RESTRICTION ANTEPARTUM: ICD-10-CM

## 2025-08-15 DIAGNOSIS — O35.BXX0 VENTRICULAR SEPTAL DEFECT (VSD) OF FETUS AFFECTING MANAGEMENT OF PREGNANCY: ICD-10-CM

## 2025-08-19 ENCOUNTER — HOSPITAL ENCOUNTER (EMERGENCY)
Facility: HOSPITAL | Age: 25
Discharge: HOME | End: 2025-08-19
Attending: SPECIALIST
Payer: MEDICAID

## 2025-08-19 VITALS
SYSTOLIC BLOOD PRESSURE: 130 MMHG | TEMPERATURE: 99 F | DIASTOLIC BLOOD PRESSURE: 63 MMHG | WEIGHT: 175 LBS | HEIGHT: 65 IN | HEART RATE: 113 BPM | RESPIRATION RATE: 14 BRPM | OXYGEN SATURATION: 98 % | BODY MASS INDEX: 29.16 KG/M2

## 2025-08-19 LAB
CTP QC/QA: YES
RUPTURE OF MEMBRANE: NEGATIVE

## 2025-08-19 PROCEDURE — 96372 THER/PROPH/DIAG INJ SC/IM: CPT | Performed by: SPECIALIST

## 2025-08-19 PROCEDURE — 99284 EMERGENCY DEPT VISIT MOD MDM: CPT | Mod: 25

## 2025-08-19 PROCEDURE — 84112 EVAL AMNIOTIC FLUID PROTEIN: CPT | Mod: MUE

## 2025-08-19 PROCEDURE — 63600175 PHARM REV CODE 636 W HCPCS: Performed by: SPECIALIST

## 2025-08-19 PROCEDURE — 84112 EVAL AMNIOTIC FLUID PROTEIN: CPT

## 2025-08-19 PROCEDURE — 96360 HYDRATION IV INFUSION INIT: CPT

## 2025-08-19 RX ORDER — TERBUTALINE SULFATE 1 MG/ML
0.25 INJECTION SUBCUTANEOUS ONCE
Status: COMPLETED | OUTPATIENT
Start: 2025-08-19 | End: 2025-08-19

## 2025-08-19 RX ADMIN — TERBUTALINE SULFATE 0.26 MG: 1 INJECTION, SOLUTION SUBCUTANEOUS at 12:08

## 2025-08-19 RX ADMIN — SODIUM CHLORIDE, POTASSIUM CHLORIDE, SODIUM LACTATE AND CALCIUM CHLORIDE 1000 ML: 600; 310; 30; 20 INJECTION, SOLUTION INTRAVENOUS at 09:08

## 2025-08-20 ENCOUNTER — PROCEDURE VISIT (OUTPATIENT)
Dept: MATERNAL FETAL MEDICINE | Facility: CLINIC | Age: 25
End: 2025-08-20
Payer: MEDICAID

## 2025-08-20 ENCOUNTER — OFFICE VISIT (OUTPATIENT)
Dept: MATERNAL FETAL MEDICINE | Facility: CLINIC | Age: 25
End: 2025-08-20
Payer: MEDICAID

## 2025-08-20 ENCOUNTER — HOSPITAL ENCOUNTER (EMERGENCY)
Facility: HOSPITAL | Age: 25
Discharge: HOME | End: 2025-08-20
Attending: OBSTETRICS & GYNECOLOGY
Payer: MEDICAID

## 2025-08-20 VITALS
SYSTOLIC BLOOD PRESSURE: 129 MMHG | RESPIRATION RATE: 18 BRPM | HEART RATE: 109 BPM | HEIGHT: 65 IN | DIASTOLIC BLOOD PRESSURE: 58 MMHG | BODY MASS INDEX: 29.49 KG/M2 | TEMPERATURE: 98 F | WEIGHT: 177 LBS | OXYGEN SATURATION: 98 %

## 2025-08-20 VITALS
SYSTOLIC BLOOD PRESSURE: 129 MMHG | BODY MASS INDEX: 29.49 KG/M2 | WEIGHT: 177 LBS | DIASTOLIC BLOOD PRESSURE: 72 MMHG | HEART RATE: 99 BPM | HEIGHT: 65 IN

## 2025-08-20 DIAGNOSIS — O35.BXX0 VENTRICULAR SEPTAL DEFECT (VSD) OF FETUS AFFECTING MANAGEMENT OF PREGNANCY: ICD-10-CM

## 2025-08-20 DIAGNOSIS — O43.93 ABNORMAL PLACENTA AFFECTING MANAGEMENT OF MOTHER IN THIRD TRIMESTER: ICD-10-CM

## 2025-08-20 DIAGNOSIS — N89.8 VAGINAL DISCHARGE DURING PREGNANCY IN THIRD TRIMESTER: ICD-10-CM

## 2025-08-20 DIAGNOSIS — O23.43 URINARY TRACT INFECTION IN MOTHER DURING THIRD TRIMESTER OF PREGNANCY: Primary | ICD-10-CM

## 2025-08-20 DIAGNOSIS — O98.113 SYPHILIS AFFECTING PREGNANCY IN THIRD TRIMESTER: ICD-10-CM

## 2025-08-20 DIAGNOSIS — O10.013 PRE-EXISTING ESSENTIAL HYPERTENSION AFFECTING PREGNANCY IN THIRD TRIMESTER: ICD-10-CM

## 2025-08-20 DIAGNOSIS — Z82.79 FAMILY HISTORY OF CONGENITAL HEART DEFECT: Primary | ICD-10-CM

## 2025-08-20 DIAGNOSIS — O09.299 PREGNANCY WITH POOR OBSTETRIC HISTORY: ICD-10-CM

## 2025-08-20 DIAGNOSIS — O36.5990 FETAL GROWTH RESTRICTION ANTEPARTUM: ICD-10-CM

## 2025-08-20 DIAGNOSIS — Z3A.34 34 WEEKS GESTATION OF PREGNANCY: ICD-10-CM

## 2025-08-20 DIAGNOSIS — O47.03 PRETERM UTERINE CONTRACTIONS, ANTEPARTUM, THIRD TRIMESTER: ICD-10-CM

## 2025-08-20 DIAGNOSIS — Z82.79 FAMILY HISTORY OF CONGENITAL HEART DEFECT: ICD-10-CM

## 2025-08-20 DIAGNOSIS — O26.893 VAGINAL DISCHARGE DURING PREGNANCY IN THIRD TRIMESTER: ICD-10-CM

## 2025-08-20 LAB
BACTERIA #/AREA URNS AUTO: ABNORMAL /HPF
BILIRUB UR QL STRIP.AUTO: NEGATIVE
CLARITY UR: CLEAR
CLUE CELLS VAG QL WET PREP: ABNORMAL
COLOR UR AUTO: ABNORMAL
CTP QC/QA: YES
GLUCOSE UR QL STRIP: NORMAL
HGB UR QL STRIP: ABNORMAL
KETONES UR QL STRIP: NEGATIVE
LEUKOCYTE ESTERASE UR QL STRIP: 250
MUCOUS THREADS URNS QL MICRO: ABNORMAL /LPF
NITRITE UR QL STRIP: NEGATIVE
NON-SQ EPI CELLS URNS QL MICRO: ABNORMAL /HPF
PH UR STRIP: 7.5 [PH]
PROT UR QL STRIP: ABNORMAL
RBC #/AREA URNS AUTO: ABNORMAL /HPF
RUPTURE OF MEMBRANE: NEGATIVE
SP GR UR STRIP.AUTO: 1.01 (ref 1–1.03)
SQUAMOUS #/AREA URNS LPF: ABNORMAL /HPF
T VAGINALIS VAG QL WET PREP: ABNORMAL
UROBILINOGEN UR STRIP-ACNC: NORMAL
WBC #/AREA URNS AUTO: ABNORMAL /HPF
WBC #/AREA VAG WET PREP: ABNORMAL
YEAST SPEC QL WET PREP: ABNORMAL

## 2025-08-20 PROCEDURE — 3074F SYST BP LT 130 MM HG: CPT | Mod: CPTII,S$GLB,, | Performed by: OBSTETRICS & GYNECOLOGY

## 2025-08-20 PROCEDURE — 1160F RVW MEDS BY RX/DR IN RCRD: CPT | Mod: CPTII,S$GLB,, | Performed by: OBSTETRICS & GYNECOLOGY

## 2025-08-20 PROCEDURE — 87086 URINE CULTURE/COLONY COUNT: CPT | Performed by: OBSTETRICS & GYNECOLOGY

## 2025-08-20 PROCEDURE — 76819 FETAL BIOPHYS PROFIL W/O NST: CPT | Mod: S$GLB,,, | Performed by: OBSTETRICS & GYNECOLOGY

## 2025-08-20 PROCEDURE — 1159F MED LIST DOCD IN RCRD: CPT | Mod: CPTII,S$GLB,, | Performed by: OBSTETRICS & GYNECOLOGY

## 2025-08-20 PROCEDURE — 81001 URINALYSIS AUTO W/SCOPE: CPT | Performed by: OBSTETRICS & GYNECOLOGY

## 2025-08-20 PROCEDURE — 3008F BODY MASS INDEX DOCD: CPT | Mod: CPTII,S$GLB,, | Performed by: OBSTETRICS & GYNECOLOGY

## 2025-08-20 PROCEDURE — 3078F DIAST BP <80 MM HG: CPT | Mod: CPTII,S$GLB,, | Performed by: OBSTETRICS & GYNECOLOGY

## 2025-08-20 PROCEDURE — 87210 SMEAR WET MOUNT SALINE/INK: CPT | Performed by: OBSTETRICS & GYNECOLOGY

## 2025-08-20 PROCEDURE — 84112 EVAL AMNIOTIC FLUID PROTEIN: CPT

## 2025-08-20 PROCEDURE — 99213 OFFICE O/P EST LOW 20 MIN: CPT | Mod: TH,S$GLB,, | Performed by: OBSTETRICS & GYNECOLOGY

## 2025-08-20 PROCEDURE — 59025 FETAL NON-STRESS TEST: CPT

## 2025-08-20 PROCEDURE — 99284 EMERGENCY DEPT VISIT MOD MDM: CPT | Mod: 25

## 2025-08-20 RX ORDER — NITROFURANTOIN 25; 75 MG/1; MG/1
100 CAPSULE ORAL 2 TIMES DAILY
Qty: 14 CAPSULE | Refills: 0 | Status: SHIPPED | OUTPATIENT
Start: 2025-08-20 | End: 2025-08-27

## 2025-08-22 LAB — BACTERIA UR CULT: NO GROWTH

## 2025-08-27 ENCOUNTER — HOSPITAL ENCOUNTER (EMERGENCY)
Facility: HOSPITAL | Age: 25
Discharge: HOME | End: 2025-08-27
Payer: MEDICAID

## 2025-08-27 VITALS
SYSTOLIC BLOOD PRESSURE: 112 MMHG | DIASTOLIC BLOOD PRESSURE: 62 MMHG | OXYGEN SATURATION: 99 % | TEMPERATURE: 99 F | HEART RATE: 83 BPM | RESPIRATION RATE: 18 BRPM

## 2025-08-27 DIAGNOSIS — O47.03 FALSE LABOR BEFORE 37 COMPLETED WEEKS OF GESTATION, THIRD TRIMESTER: Primary | ICD-10-CM

## 2025-08-27 DIAGNOSIS — Z3A.35 35 WEEKS GESTATION OF PREGNANCY: ICD-10-CM

## 2025-08-27 DIAGNOSIS — O47.9 BRAXTON HICKS CONTRACTIONS: ICD-10-CM

## 2025-08-27 PROCEDURE — 99284 EMERGENCY DEPT VISIT MOD MDM: CPT | Mod: 25

## 2025-08-27 PROCEDURE — 59025 FETAL NON-STRESS TEST: CPT

## 2025-08-28 ENCOUNTER — PROCEDURE VISIT (OUTPATIENT)
Dept: MATERNAL FETAL MEDICINE | Facility: CLINIC | Age: 25
End: 2025-08-28
Payer: MEDICAID

## 2025-08-28 ENCOUNTER — OFFICE VISIT (OUTPATIENT)
Dept: MATERNAL FETAL MEDICINE | Facility: CLINIC | Age: 25
End: 2025-08-28
Payer: MEDICAID

## 2025-08-28 VITALS
SYSTOLIC BLOOD PRESSURE: 126 MMHG | HEART RATE: 98 BPM | DIASTOLIC BLOOD PRESSURE: 81 MMHG | BODY MASS INDEX: 28.99 KG/M2 | WEIGHT: 174 LBS | HEIGHT: 65 IN

## 2025-08-28 DIAGNOSIS — O35.BXX0 VENTRICULAR SEPTAL DEFECT (VSD) OF FETUS AFFECTING MANAGEMENT OF PREGNANCY: ICD-10-CM

## 2025-08-28 DIAGNOSIS — O36.5990 FETAL GROWTH RESTRICTION ANTEPARTUM: ICD-10-CM

## 2025-08-28 DIAGNOSIS — O09.299 PREGNANCY WITH POOR OBSTETRIC HISTORY: ICD-10-CM

## 2025-08-28 DIAGNOSIS — O43.93 ABNORMAL PLACENTA AFFECTING MANAGEMENT OF MOTHER IN THIRD TRIMESTER: ICD-10-CM

## 2025-08-28 DIAGNOSIS — Z82.79 FAMILY HISTORY OF CONGENITAL HEART DEFECT: ICD-10-CM

## 2025-08-28 DIAGNOSIS — O10.013 PRE-EXISTING ESSENTIAL HYPERTENSION AFFECTING PREGNANCY IN THIRD TRIMESTER: ICD-10-CM

## 2025-08-28 DIAGNOSIS — O98.113 SYPHILIS AFFECTING PREGNANCY IN THIRD TRIMESTER: Primary | ICD-10-CM

## 2025-08-28 PROCEDURE — 76819 FETAL BIOPHYS PROFIL W/O NST: CPT | Mod: S$GLB,,, | Performed by: OBSTETRICS & GYNECOLOGY

## 2025-08-28 PROCEDURE — 3008F BODY MASS INDEX DOCD: CPT | Mod: CPTII,S$GLB,, | Performed by: OBSTETRICS & GYNECOLOGY

## 2025-08-28 PROCEDURE — 76816 OB US FOLLOW-UP PER FETUS: CPT | Mod: S$GLB,,, | Performed by: OBSTETRICS & GYNECOLOGY

## 2025-08-28 PROCEDURE — 1159F MED LIST DOCD IN RCRD: CPT | Mod: CPTII,S$GLB,, | Performed by: OBSTETRICS & GYNECOLOGY

## 2025-08-28 PROCEDURE — 3074F SYST BP LT 130 MM HG: CPT | Mod: CPTII,S$GLB,, | Performed by: OBSTETRICS & GYNECOLOGY

## 2025-08-28 PROCEDURE — 1160F RVW MEDS BY RX/DR IN RCRD: CPT | Mod: CPTII,S$GLB,, | Performed by: OBSTETRICS & GYNECOLOGY

## 2025-08-28 PROCEDURE — 99213 OFFICE O/P EST LOW 20 MIN: CPT | Mod: TH,S$GLB,, | Performed by: OBSTETRICS & GYNECOLOGY

## 2025-08-28 PROCEDURE — 76820 UMBILICAL ARTERY ECHO: CPT | Mod: S$GLB,,, | Performed by: OBSTETRICS & GYNECOLOGY

## 2025-08-28 PROCEDURE — 3079F DIAST BP 80-89 MM HG: CPT | Mod: CPTII,S$GLB,, | Performed by: OBSTETRICS & GYNECOLOGY

## 2025-09-04 ENCOUNTER — PROCEDURE VISIT (OUTPATIENT)
Dept: MATERNAL FETAL MEDICINE | Facility: CLINIC | Age: 25
End: 2025-09-04
Payer: MEDICAID

## 2025-09-04 ENCOUNTER — OFFICE VISIT (OUTPATIENT)
Dept: MATERNAL FETAL MEDICINE | Facility: CLINIC | Age: 25
End: 2025-09-04
Payer: MEDICAID

## 2025-09-04 VITALS
BODY MASS INDEX: 29.49 KG/M2 | WEIGHT: 177 LBS | HEART RATE: 108 BPM | SYSTOLIC BLOOD PRESSURE: 129 MMHG | DIASTOLIC BLOOD PRESSURE: 74 MMHG | HEIGHT: 65 IN

## 2025-09-04 DIAGNOSIS — O36.5990 FETAL GROWTH RESTRICTION ANTEPARTUM: ICD-10-CM

## 2025-09-04 DIAGNOSIS — Z82.79 FAMILY HISTORY OF CONGENITAL HEART DEFECT: ICD-10-CM

## 2025-09-04 DIAGNOSIS — O09.299 PREGNANCY WITH POOR OBSTETRIC HISTORY: ICD-10-CM

## 2025-09-04 DIAGNOSIS — O98.113 SYPHILIS AFFECTING PREGNANCY IN THIRD TRIMESTER: ICD-10-CM

## 2025-09-04 DIAGNOSIS — O35.BXX0 VENTRICULAR SEPTAL DEFECT (VSD) OF FETUS AFFECTING MANAGEMENT OF PREGNANCY: ICD-10-CM

## 2025-09-04 DIAGNOSIS — O09.893 NONCOMPLIANT PREGNANT PATIENT IN THIRD TRIMESTER: ICD-10-CM

## 2025-09-04 DIAGNOSIS — O10.013 PRE-EXISTING ESSENTIAL HYPERTENSION AFFECTING PREGNANCY IN THIRD TRIMESTER: Primary | ICD-10-CM

## 2025-09-04 DIAGNOSIS — Z91.199 NONCOMPLIANT PREGNANT PATIENT IN THIRD TRIMESTER: ICD-10-CM

## 2025-09-04 DIAGNOSIS — O43.93 ABNORMAL PLACENTA AFFECTING MANAGEMENT OF MOTHER IN THIRD TRIMESTER: ICD-10-CM

## 2025-09-04 DIAGNOSIS — O10.013 PRE-EXISTING ESSENTIAL HYPERTENSION AFFECTING PREGNANCY IN THIRD TRIMESTER: ICD-10-CM

## 2025-09-04 PROCEDURE — 3008F BODY MASS INDEX DOCD: CPT | Mod: CPTII,S$GLB,, | Performed by: OBSTETRICS & GYNECOLOGY

## 2025-09-04 PROCEDURE — 3074F SYST BP LT 130 MM HG: CPT | Mod: CPTII,S$GLB,, | Performed by: OBSTETRICS & GYNECOLOGY

## 2025-09-04 PROCEDURE — 1159F MED LIST DOCD IN RCRD: CPT | Mod: CPTII,S$GLB,, | Performed by: OBSTETRICS & GYNECOLOGY

## 2025-09-04 PROCEDURE — 3078F DIAST BP <80 MM HG: CPT | Mod: CPTII,S$GLB,, | Performed by: OBSTETRICS & GYNECOLOGY

## 2025-09-04 PROCEDURE — 1160F RVW MEDS BY RX/DR IN RCRD: CPT | Mod: CPTII,S$GLB,, | Performed by: OBSTETRICS & GYNECOLOGY

## 2025-09-04 PROCEDURE — 76820 UMBILICAL ARTERY ECHO: CPT | Mod: S$GLB,,, | Performed by: OBSTETRICS & GYNECOLOGY

## 2025-09-04 PROCEDURE — 76819 FETAL BIOPHYS PROFIL W/O NST: CPT | Mod: S$GLB,,, | Performed by: OBSTETRICS & GYNECOLOGY

## 2025-09-04 PROCEDURE — 99213 OFFICE O/P EST LOW 20 MIN: CPT | Mod: TH,S$GLB,, | Performed by: OBSTETRICS & GYNECOLOGY

## (undated) DEVICE — PACK DRAPE PERI/GYN TIBURON

## (undated) DEVICE — SOL IRRI STRL WATER 1000ML

## (undated) DEVICE — SPONGE LAP STRL 18X18IN

## (undated) DEVICE — Device

## (undated) DEVICE — SUT 1 36IN PDS II

## (undated) DEVICE — TOWEL OR DISP STRL BLUE 4/PK

## (undated) DEVICE — HANDLE DEVON RIGID OR LIGHT

## (undated) DEVICE — PAD PINK TRENDELENBURG POS XL

## (undated) DEVICE — SYR IRRIGATION BULB STER 60ML

## (undated) DEVICE — TRAY SKIN SCRUB WET PREMIUM

## (undated) DEVICE — ELECTRODE PATIENT RETURN DISP

## (undated) DEVICE — GLOVE PROTEXIS BLUE LATEX 7

## (undated) DEVICE — KIT LAPAROSCOPY UNIVERSITY

## (undated) DEVICE — TRAY CATH FOL SIL URIMTR 16FR

## (undated) DEVICE — ADHESIVE DERMABOND ADVANCED

## (undated) DEVICE — ELECTRODE REM POLYHESIVE II

## (undated) DEVICE — TUBING MEDI-VAC 20FT .25IN

## (undated) DEVICE — SUT 3-0 VICRYL / SH (J416)

## (undated) DEVICE — SUT JJ41G O VICRYL

## (undated) DEVICE — MANIFOLD 4 PORT

## (undated) DEVICE — DRAPE UINDERBUT GRAD PCH

## (undated) DEVICE — APPLICATOR CHLORAPREP ORN 26ML